# Patient Record
Sex: FEMALE | Race: WHITE | NOT HISPANIC OR LATINO | Employment: OTHER | URBAN - METROPOLITAN AREA
[De-identification: names, ages, dates, MRNs, and addresses within clinical notes are randomized per-mention and may not be internally consistent; named-entity substitution may affect disease eponyms.]

---

## 2017-02-13 ENCOUNTER — OFFICE VISIT (OUTPATIENT)
Dept: OBSTETRICS AND GYNECOLOGY | Facility: CLINIC | Age: 67
End: 2017-02-13
Attending: OBSTETRICS & GYNECOLOGY
Payer: MEDICARE

## 2017-02-13 VITALS
WEIGHT: 157.94 LBS | SYSTOLIC BLOOD PRESSURE: 120 MMHG | HEIGHT: 62 IN | DIASTOLIC BLOOD PRESSURE: 78 MMHG | BODY MASS INDEX: 29.06 KG/M2

## 2017-02-13 DIAGNOSIS — Z01.419 WELL FEMALE EXAM WITH ROUTINE GYNECOLOGICAL EXAM: ICD-10-CM

## 2017-02-13 DIAGNOSIS — R10.2 PELVIC PAIN IN FEMALE: ICD-10-CM

## 2017-02-13 DIAGNOSIS — Z11.51 SCREENING FOR HUMAN PAPILLOMAVIRUS: ICD-10-CM

## 2017-02-13 DIAGNOSIS — Z12.31 VISIT FOR SCREENING MAMMOGRAM: Primary | ICD-10-CM

## 2017-02-13 PROCEDURE — 88175 CYTOPATH C/V AUTO FLUID REDO: CPT

## 2017-02-13 PROCEDURE — 99999 PR PBB SHADOW E&M-EST. PATIENT-LVL III: CPT | Mod: PBBFAC,,, | Performed by: OBSTETRICS & GYNECOLOGY

## 2017-02-13 PROCEDURE — G0101 CA SCREEN;PELVIC/BREAST EXAM: HCPCS | Mod: S$GLB,,, | Performed by: OBSTETRICS & GYNECOLOGY

## 2017-02-13 PROCEDURE — 87624 HPV HI-RISK TYP POOLED RSLT: CPT

## 2017-02-13 RX ORDER — ERGOCALCIFEROL 1.25 MG/1
50000 CAPSULE ORAL
Refills: 0 | COMMUNITY
Start: 2017-01-16 | End: 2018-01-15

## 2017-02-13 RX ORDER — FLUTICASONE FUROATE AND VILANTEROL TRIFENATATE 100; 25 UG/1; UG/1
1 POWDER RESPIRATORY (INHALATION) DAILY
Refills: 0 | COMMUNITY
Start: 2017-01-06 | End: 2019-01-10 | Stop reason: SDUPTHER

## 2017-02-13 RX ORDER — ESTRADIOL 0.1 MG/G
1 CREAM VAGINAL DAILY
Qty: 42.5 G | Refills: 1 | Status: SHIPPED | OUTPATIENT
Start: 2017-02-13 | End: 2017-08-09 | Stop reason: SDUPTHER

## 2017-02-13 NOTE — PROGRESS NOTES
"CC: Well woman exam    Concha Hess is a 66 y.o. female  presents for a well woman exam.  She is not established.      HPI:  Patient presents with bladder pressure feeling that is intermittent.  Patient denies vaginal bleeding or dysuria.  Patient not currently on hormones but used then in the past.  Patient denies bloating.  Patient Had 2 vaginal deliveries with largest baby being 9#5.  Occasional stress incontinence but mild per patient.  Denies GI symptoms.  No constipation.    Past Medical History   Diagnosis Date    Acid reflux     Chronic lung disease     Depression     Gastric ulcer     Hyperlipidemia        Past Surgical History   Procedure Laterality Date     section      Breast surgery       AUGMENTATION    Appendectomy      Cosmetic surgery       FACE LIFT       OB History    Para Term  AB SAB TAB Ectopic Multiple Living   3 3        3      # Outcome Date GA Lbr Garfield/2nd Weight Sex Delivery Anes PTL Lv   3 Para    3.232 kg (7 lb 2 oz) F CS-LTranv   Y      Complications: Placenta previa   2 Para    3.289 kg (7 lb 4 oz) M Vag-Spont   Y   1 Para    4.224 kg (9 lb 5 oz) M Vag-Spont   Y          Family History   Problem Relation Age of Onset    Cancer Mother     Heart disease Father        Social History   Substance Use Topics    Smoking status: Former Smoker    Smokeless tobacco: Former User    Alcohol use Yes       Visit Vitals    /78    Ht 5' 2" (1.575 m)    Wt 71.6 kg (157 lb 15.4 oz)    BMI 28.89 kg/m2       ROS:  Review of Systems   Constitutional: Negative for fatigue.   Respiratory: Positive for cough. Negative for shortness of breath.    Cardiovascular: Negative for chest pain.   Gastrointestinal: Negative for abdominal pain, constipation, diarrhea and nausea.   Endocrine: Negative for heat intolerance.   Genitourinary: Positive for pelvic pain (describes pelvic pressure not pain that is intermittent). Negative for decreased urine " volume, dysuria, flank pain, urgency and vaginal discharge.   Musculoskeletal: Negative for back pain.   Skin: Negative for rash.   Neurological: Negative for headaches.   Hematological: Negative for adenopathy.   Psychiatric/Behavioral: Negative for dysphoric mood. The patient is not nervous/anxious.        Physical Exam:  Physical Exam:   Constitutional: She is oriented to person, place, and time. She appears well-developed and well-nourished.      Neck: No thyromegaly present.    Cardiovascular: Normal rate.     Pulmonary/Chest: Effort normal and breath sounds normal. Right breast exhibits no mass, no nipple discharge, no skin change, no tenderness and no bleeding. Left breast exhibits no mass, no nipple discharge, no skin change, no tenderness and no bleeding.        Abdominal: Soft. Normal appearance and bowel sounds are normal. She exhibits no distension and no mass. There is no tenderness. There is no rebound and no guarding.     Genitourinary: Vagina normal and uterus normal. Pelvic exam was performed with patient supine. There is no rash, tenderness, lesion or injury on the right labia. There is no rash, tenderness, lesion or injury on the left labia. Uterus is not enlarged, not fixed and not tender. Cervix is normal. Right adnexum displays no mass, no tenderness and no fullness. Left adnexum displays no mass, no tenderness and no fullness. No erythema, tenderness, rectocele, cystocele or unspecified prolapse of vaginal walls in the vagina. No signs of injury around the vagina. No vaginal discharge found. Cervix exhibits no motion tenderness, no discharge and no friability.   Genitourinary Comments: Vulvar atrophy noted on exam            Musculoskeletal: Normal range of motion and moves all extremeties.      Lymphadenopathy:     She has no axillary adenopathy.        Right: No supraclavicular adenopathy present.        Left: No supraclavicular adenopathy present.    Neurological: She is alert and oriented  to person, place, and time.    Skin: Skin is warm and dry.    Psychiatric: She has a normal mood and affect. Her behavior is normal. Judgment normal.       ASSESSMENT AND PLAN  Visit for screening mammogram  -     Mammo Digital Screening Bilat with Tomosynthesis CAD; Future; Expected date: 2/13/17    Pelvic pain in female  -     US Pelvis Comp with Transvag NON-OB (xpd; Future; Expected date: 2/13/17    Well female exam with routine gynecological exam  -     Liquid-based pap smear, screening  -     HPV DNA probe, amplified    Screening for human papillomavirus  -     Liquid-based pap smear, screening    Other orders  -     estradiol (ESTRACE) 0.01 % (0.1 mg/gram) vaginal cream; Place 1 g vaginally once daily.  Dispense: 42.5 g; Refill: 1        Patient was counseled today on A.C.S. Pap guidelines and recommendations for yearly pelvic exams, mammograms and monthly self breast exams; to see her PCP for other health maintenance.     No Follow-up on file.

## 2017-02-13 NOTE — MR AVS SNAPSHOT
[facility]  [address]  [address]  [facility] [address]  Phone: [phone]  Fax: [phone]                  Concha Hess   2017 9:30 AM   Office Visit    Description:  Female : 1950   Provider:  Alexandra Thompson MD   [facility]           Reason for Visit     new patient     Vulvar Itch     Urinary Pressure     Urinary Frequency           Diagnoses this Visit        Comments    Visit for screening mammogram    -  Primary     Pelvic pain in female         Well female exam with routine gynecological exam         Screening for human papillomavirus                To Do List           Future Appointments        Provider Department Dept Phone    3/3/2017 1:00 PM [facility], WOMEN'S ULTRASOUND [facility] Ultrasound [phone]    3/3/2017 1:30 PM Alexandra Thompson MD [facility] [phone]      Goals (5 Years of Data)     None      [facility] On Call     [facility] On Call Nurse Care Line -  Assistance  Registered nurses in the [facility] On Call Center provide clinical advisement, health education, appointment booking, and other advisory services.  Call for this free service at [phone].             Medications           Message regarding Medications     Verify the changes and/or additions to your medication regime listed below are the same as discussed with your clinician today.  If any of these changes or additions are incorrect, please notify your healthcare provider.             Verify that the below list of medications is an accurate representation of the medications you are currently taking.  If none reported, the list may be blank. If incorrect, please contact your healthcare provider. Carry this list with you in case of emergency.           Current Medications     aspirin (ECOTRIN) 81 MG EC tablet Take 81 mg by mouth once daily.    BREO ELLIPTA 100-25 mcg/dose diskus inhaler     clorazepate (TRANXENE) 7.5 MG Tab Take 7.5 mg by  "mouth 3 (three) times daily.    escitalopram (LEXAPRO) 10 MG tablet     multivit-mineral-iron-lutein Tab Take by mouth.    MULTIVITS-MIN/IRON/FA/LUTEIN (ULTIMATE WOMEN'S COMPLETE 50+ ORAL) Take by mouth.    VITAMIN D2 50,000 unit capsule Take 50,000 Units by mouth every 7 days.    zolpidem (AMBIEN) 10 mg Tab            Clinical Reference Information           Your Vitals Were     BP Height Weight BMI       120/78 5' 2" (1.575 m) 71.6 kg (157 lb 15.4 oz) 28.89 kg/m2       Blood Pressure          Most Recent Value    BP  120/78      Allergies as of 2/13/2017     Dilaudid [Hydromorphone]    Morphine      Immunizations Administered on Date of Encounter - 2/13/2017     None      Orders Placed During Today's Visit      Normal Orders This Visit    HPV DNA probe, amplified     Liquid-based pap smear, screening     Future Labs/Procedures Expected by Expires    Mammo Digital Screening Bilat with Tomosynthesis CAD  2/13/2017 4/15/2018    US Pelvis Comp with Transvag NON-OB (xpd  2/13/2017 2/13/2018      Language Assistance Services     ATTENTION: Language assistance services are available, free of charge. Please call [phone].      ATENCIÓN: Si habla adrian, tiene a perea disposición servicios gratuitos de asistencia lingüística. Llame al [phone].     [facility] Ý: N?u b?n nói Ti?ng Vi?t, có các d?ch v? h? tr? ngôn ng? mi?n phí dành cho b?n. G?i s? [phone].         [facility] complies with applicable Federal civil rights laws and does not discriminate on the basis of race, color, national origin, age, disability, or sex.        "

## 2017-02-13 NOTE — LETTER
February 13, 2017      Rafael Rudolph MD  [address]  Retired  [place] [address]           [facility]  [address]  [address] [place] [address]  Phone: [phone]  Fax: [phone]          Patient: Concha Hess   MR Number: [id number]   YOB: 1950   Date of Visit: 2/13/2017       Dear Dr. Rafael Rudolph:    Thank you for referring Concha Hess to me for evaluation. Attached you will find relevant portions of my assessment and plan of care.    If you have questions, please do not hesitate to call me. I look forward to following Concha Hess along with you.    Sincerely,    Alexandra Thompson MD    Enclosure  CC:  No Recipients    If you would like to receive this communication electronically, please contact [email or web address] or [phone] to request more information on [facility] Link access.    For providers and/or their staff who would like to refer a patient to [facility], please contact us through our one-stop-shop provider referral line, [facility], at [phone].    If you feel you have received this communication in error or would no longer like to receive these types of communications, please e-mail [email or web address]

## 2017-02-20 LAB — HUMAN PAPILLOMAVIRUS (HPV): NOT DETECTED

## 2017-03-03 ENCOUNTER — OFFICE VISIT (OUTPATIENT)
Dept: OBSTETRICS AND GYNECOLOGY | Facility: CLINIC | Age: 67
End: 2017-03-03
Attending: OBSTETRICS & GYNECOLOGY
Payer: MEDICARE

## 2017-03-03 VITALS — SYSTOLIC BLOOD PRESSURE: 120 MMHG | HEIGHT: 62 IN | DIASTOLIC BLOOD PRESSURE: 74 MMHG

## 2017-03-03 DIAGNOSIS — R93.89 ENDOMETRIAL THICKENING ON ULTRA SOUND: Primary | ICD-10-CM

## 2017-03-03 PROCEDURE — 1160F RVW MEDS BY RX/DR IN RCRD: CPT | Mod: S$GLB,,, | Performed by: OBSTETRICS & GYNECOLOGY

## 2017-03-03 PROCEDURE — 99213 OFFICE O/P EST LOW 20 MIN: CPT | Mod: S$GLB,,, | Performed by: OBSTETRICS & GYNECOLOGY

## 2017-03-03 PROCEDURE — 1157F ADVNC CARE PLAN IN RCRD: CPT | Mod: S$GLB,,, | Performed by: OBSTETRICS & GYNECOLOGY

## 2017-03-03 PROCEDURE — 1159F MED LIST DOCD IN RCRD: CPT | Mod: S$GLB,,, | Performed by: OBSTETRICS & GYNECOLOGY

## 2017-03-03 PROCEDURE — 99999 PR PBB SHADOW E&M-EST. PATIENT-LVL II: CPT | Mod: PBBFAC,,, | Performed by: OBSTETRICS & GYNECOLOGY

## 2017-03-03 NOTE — MR AVS SNAPSHOT
"    [facility]  [address]  [address]  [facility] [address]  Phone: [phone]  Fax: [phone]                  Concha Hess   3/3/2017 1:30 PM   Office Visit    Description:  Female : 1950   Provider:  Alexandra Thompson MD   Department:  [facility]                To Do List           Goals (5 Years of Data)     None      [facility] On Call     [facility] On Call Nurse Care Line -  Assistance  Registered nurses in the [facility] On Call Center provide clinical advisement, health education, appointment booking, and other advisory services.  Call for this free service at [phone].             Medications           Message regarding Medications     Verify the changes and/or additions to your medication regime listed below are the same as discussed with your clinician today.  If any of these changes or additions are incorrect, please notify your healthcare provider.             Verify that the below list of medications is an accurate representation of the medications you are currently taking.  If none reported, the list may be blank. If incorrect, please contact your healthcare provider. Carry this list with you in case of emergency.           Current Medications     aspirin (ECOTRIN) 81 MG EC tablet Take 81 mg by mouth once daily.    BREO ELLIPTA 100-25 mcg/dose diskus inhaler     clorazepate (TRANXENE) 7.5 MG Tab Take 7.5 mg by mouth 3 (three) times daily.    escitalopram (LEXAPRO) 10 MG tablet     estradiol (ESTRACE) 0.01 % (0.1 mg/gram) vaginal cream Place 1 g vaginally once daily.    multivit-mineral-iron-lutein Tab Take by mouth.    MULTIVITS-MIN/IRON/FA/LUTEIN (ULTIMATE WOMEN'S COMPLETE 50+ ORAL) Take by mouth.    VITAMIN D2 50,000 unit capsule Take 50,000 Units by mouth every 7 days.    zolpidem (AMBIEN) 10 mg Tab            Clinical Reference Information           Your Vitals Were     BP Height                120/74 5' 2" (1.575 m)          Blood Pressure     "      Most Recent Value    BP  120/74      Allergies as of 3/3/2017     Dilaudid [Hydromorphone]    Morphine      Immunizations Administered on Date of Encounter - 3/3/2017     None      Language Assistance Services     ATTENTION: Language assistance services are available, free of charge. Please call [phone].      ATENCIÓN: Si habla adrian, tiene a perea disposición servicios gratuitos de asistencia lingüística. Llame al [phone].     CHÚ Ý: N?u b?n nói Ti?ng Vi?t, có các d?ch v? h? tr? ngôn ng? mi?n phí dành cho b?n. G?i s? [phone].         [facility] complies with applicable Federal civil rights laws and does not discriminate on the basis of race, color, national origin, age, disability, or sex.

## 2017-03-06 ENCOUNTER — TELEPHONE (OUTPATIENT)
Dept: OBSTETRICS AND GYNECOLOGY | Facility: CLINIC | Age: 67
End: 2017-03-06

## 2017-03-06 DIAGNOSIS — R93.89 THICKENED ENDOMETRIUM: Primary | ICD-10-CM

## 2017-03-06 NOTE — TELEPHONE ENCOUNTER
Pt called in regards to a date about scheduling a surgical procedure. Pt wants to do it on 3/17. Please advise.

## 2017-03-08 NOTE — PROGRESS NOTES
Subjective:       Patient ID: Concha Hess is a 66 y.o. female.    Chief Complaint:  No chief complaint on file.      History of Present Illness  66 year old with no post menopausal bleeding but patient reports that vaginal pressure has been increasing.  Patient reports a discomfort on her bladder area.  Denies dysuria.      GYN & OB History  No LMP recorded. Patient is postmenopausal.   Date of Last Pap: 2017    OB History    Para Term  AB SAB TAB Ectopic Multiple Living   3 3        3      # Outcome Date GA Lbr Garfield/2nd Weight Sex Delivery Anes PTL Lv   3 Para    3.232 kg (7 lb 2 oz) F CS-LTranv   Y      Complications: Placenta previa   2 Para    3.289 kg (7 lb 4 oz) M Vag-Spont   Y   1 Para    4.224 kg (9 lb 5 oz) M Vag-Spont   Y          Past Medical History:   Diagnosis Date    Acid reflux     Chronic lung disease     Depression     Gastric ulcer     Hyperlipidemia        Past Surgical History:   Procedure Laterality Date    APPENDECTOMY      BREAST SURGERY      AUGMENTATION     SECTION      COSMETIC SURGERY      FACE LIFT       Review of Systems  Review of Systems   Constitutional: Negative for unexpected weight change.   Respiratory: Negative for shortness of breath.    Cardiovascular: Negative for palpitations.   Gastrointestinal: Positive for abdominal distention and abdominal pain (lower pelvic pain ). Negative for constipation, diarrhea and nausea.   Genitourinary: Positive for frequency and pelvic pain (pressure ). Negative for dysuria, genital sores, hematuria, menstrual problem, vaginal bleeding and vaginal discharge.        Objective:   Physical Exam:   Constitutional: She appears well-developed.        Pulmonary/Chest: Effort normal.        Abdominal: Soft. Bowel sounds are normal. She exhibits no distension. There is no tenderness.     Genitourinary: Uterus normal. Right adnexum displays mass. Left adnexum displays no mass. There is cystocele and  unspecified prolapse of vaginal walls in the vagina.              Lymphadenopathy:        Right: No inguinal adenopathy present.        Left: No inguinal adenopathy present.     Skin: Skin is warm.    Psychiatric: She has a normal mood and affect.        Assessment/ Plan:     Endometrial thickening on ultra sound    Other orders  -     Cancel: Tissue Specimen To Pathology, Obstetrics/Gynecology    EMB was unable to be performed due to cervical stenosis.  Will schedule patient for hysteroscopy joya elder and then patient needs follow up with uro-gyn.       No Follow-up on file.    Patient was counseled today on A.C.S. Pap guidelines and recommendations for yearly pelvic exams, mammograms and monthly self breast exams; to see her PCP for other health maintenance.

## 2017-03-14 ENCOUNTER — OFFICE VISIT (OUTPATIENT)
Dept: OBSTETRICS AND GYNECOLOGY | Facility: CLINIC | Age: 67
End: 2017-03-14
Payer: MEDICARE

## 2017-03-14 ENCOUNTER — ANESTHESIA EVENT (OUTPATIENT)
Dept: SURGERY | Facility: OTHER | Age: 67
End: 2017-03-14
Payer: MEDICARE

## 2017-03-14 ENCOUNTER — HOSPITAL ENCOUNTER (OUTPATIENT)
Dept: RADIOLOGY | Facility: OTHER | Age: 67
Discharge: HOME | End: 2017-03-14
Attending: OBSTETRICS & GYNECOLOGY
Payer: MEDICARE

## 2017-03-14 ENCOUNTER — HOSPITAL ENCOUNTER (OUTPATIENT)
Dept: PREADMISSION TESTING | Facility: OTHER | Age: 67
Discharge: HOME | End: 2017-03-14
Attending: OBSTETRICS & GYNECOLOGY
Payer: MEDICARE

## 2017-03-14 VITALS
HEIGHT: 62 IN | WEIGHT: 137 LBS | SYSTOLIC BLOOD PRESSURE: 122 MMHG | DIASTOLIC BLOOD PRESSURE: 74 MMHG | BODY MASS INDEX: 25.21 KG/M2

## 2017-03-14 VITALS
OXYGEN SATURATION: 95 % | HEART RATE: 70 BPM | TEMPERATURE: 98 F | DIASTOLIC BLOOD PRESSURE: 71 MMHG | BODY MASS INDEX: 25.21 KG/M2 | SYSTOLIC BLOOD PRESSURE: 125 MMHG | WEIGHT: 137 LBS | HEIGHT: 62 IN

## 2017-03-14 DIAGNOSIS — R93.89 THICKENED ENDOMETRIUM: ICD-10-CM

## 2017-03-14 DIAGNOSIS — R93.89 THICKENED ENDOMETRIUM: Primary | ICD-10-CM

## 2017-03-14 LAB
BASOPHILS # BLD AUTO: 0.06 K/UL
BASOPHILS NFR BLD: 0.8 %
DIFFERENTIAL METHOD: ABNORMAL
EOSINOPHIL # BLD AUTO: 0.3 K/UL
EOSINOPHIL NFR BLD: 3.4 %
ERYTHROCYTE [DISTWIDTH] IN BLOOD BY AUTOMATED COUNT: 14.3 %
HCT VFR BLD AUTO: 39.3 %
HGB BLD-MCNC: 12.9 G/DL
LYMPHOCYTES # BLD AUTO: 2.8 K/UL
LYMPHOCYTES NFR BLD: 35.4 %
MCH RBC QN AUTO: 29.7 PG
MCHC RBC AUTO-ENTMCNC: 32.8 %
MCV RBC AUTO: 91 FL
MONOCYTES # BLD AUTO: 0.5 K/UL
MONOCYTES NFR BLD: 6.4 %
NEUTROPHILS # BLD AUTO: 4.3 K/UL
NEUTROPHILS NFR BLD: 54 %
PLATELET # BLD AUTO: 383 K/UL
PMV BLD AUTO: 9.9 FL
RBC # BLD AUTO: 4.34 M/UL
WBC # BLD AUTO: 7.91 K/UL

## 2017-03-14 PROCEDURE — 99212 OFFICE O/P EST SF 10 MIN: CPT | Mod: S$GLB,,, | Performed by: OBSTETRICS & GYNECOLOGY

## 2017-03-14 PROCEDURE — 1160F RVW MEDS BY RX/DR IN RCRD: CPT | Mod: S$GLB,,, | Performed by: OBSTETRICS & GYNECOLOGY

## 2017-03-14 PROCEDURE — 71020 XR CHEST PA AND LATERAL PRE-OP: CPT | Mod: 26,,, | Performed by: RADIOLOGY

## 2017-03-14 PROCEDURE — 1159F MED LIST DOCD IN RCRD: CPT | Mod: S$GLB,,, | Performed by: OBSTETRICS & GYNECOLOGY

## 2017-03-14 PROCEDURE — 85025 COMPLETE CBC W/AUTO DIFF WBC: CPT

## 2017-03-14 PROCEDURE — 36415 COLL VENOUS BLD VENIPUNCTURE: CPT

## 2017-03-14 PROCEDURE — 1157F ADVNC CARE PLAN IN RCRD: CPT | Mod: S$GLB,,, | Performed by: OBSTETRICS & GYNECOLOGY

## 2017-03-14 PROCEDURE — 1126F AMNT PAIN NOTED NONE PRSNT: CPT | Mod: S$GLB,,, | Performed by: OBSTETRICS & GYNECOLOGY

## 2017-03-14 PROCEDURE — 71020 XR CHEST PA AND LATERAL PRE-OP: CPT | Mod: TC

## 2017-03-14 PROCEDURE — 99999 PR PBB SHADOW E&M-EST. PATIENT-LVL III: CPT | Mod: PBBFAC,,, | Performed by: OBSTETRICS & GYNECOLOGY

## 2017-03-14 RX ORDER — ONDANSETRON 4 MG/1
8 TABLET, ORALLY DISINTEGRATING ORAL EVERY 8 HOURS PRN
Status: CANCELLED | OUTPATIENT
Start: 2017-03-14

## 2017-03-14 RX ORDER — MIDAZOLAM HYDROCHLORIDE 5 MG/ML
4 INJECTION INTRAMUSCULAR; INTRAVENOUS
Status: DISCONTINUED | OUTPATIENT
Start: 2017-03-21 | End: 2017-03-15 | Stop reason: HOSPADM

## 2017-03-14 RX ORDER — CETIRIZINE HYDROCHLORIDE 10 MG/1
10 TABLET, CHEWABLE ORAL DAILY
COMMUNITY
End: 2021-09-10

## 2017-03-14 RX ORDER — SCOLOPAMINE TRANSDERMAL SYSTEM 1 MG/1
1 PATCH, EXTENDED RELEASE TRANSDERMAL ONCE
Status: CANCELLED | OUTPATIENT
Start: 2017-03-14 | End: 2017-03-14

## 2017-03-14 RX ORDER — DEXAMETHASONE SODIUM PHOSPHATE 4 MG/ML
8 INJECTION, SOLUTION INTRA-ARTICULAR; INTRALESIONAL; INTRAMUSCULAR; INTRAVENOUS; SOFT TISSUE ONCE
Status: CANCELLED | OUTPATIENT
Start: 2017-03-14 | End: 2017-03-14

## 2017-03-14 RX ORDER — SODIUM CHLORIDE, SODIUM LACTATE, POTASSIUM CHLORIDE, CALCIUM CHLORIDE 600; 310; 30; 20 MG/100ML; MG/100ML; MG/100ML; MG/100ML
INJECTION, SOLUTION INTRAVENOUS CONTINUOUS
Status: CANCELLED | OUTPATIENT
Start: 2017-03-14

## 2017-03-14 RX ORDER — ALBUTEROL SULFATE 2.5 MG/.5ML
2.5 SOLUTION RESPIRATORY (INHALATION) EVERY 4 HOURS PRN
Status: CANCELLED | OUTPATIENT
Start: 2017-03-14

## 2017-03-14 RX ORDER — ACETAMINOPHEN 10 MG/ML
1000 INJECTION, SOLUTION INTRAVENOUS
Status: CANCELLED | OUTPATIENT
Start: 2017-03-14 | End: 2017-03-14

## 2017-03-14 RX ORDER — FAMOTIDINE 20 MG/1
20 TABLET, FILM COATED ORAL
Status: CANCELLED | OUTPATIENT
Start: 2017-03-14 | End: 2017-03-14

## 2017-03-14 RX ORDER — HYDROGEN PEROXIDE 3 %
20 SOLUTION, NON-ORAL MISCELLANEOUS
COMMUNITY

## 2017-03-14 RX ORDER — PREGABALIN 75 MG/1
150 CAPSULE ORAL
Status: DISCONTINUED | OUTPATIENT
Start: 2017-03-21 | End: 2017-03-15 | Stop reason: HOSPADM

## 2017-03-14 NOTE — MR AVS SNAPSHOT
[facility]  [address]  [place] [address]  Phone: [phone]  Fax: [phone]                  Concha Hess   3/14/2017 10:45 AM   Office Visit    Description:  Female : 1950   Provider:  Alexandra Thompson MD   Department:  [facility]           Reason for Visit     Pre-op Exam           Diagnoses this Visit        Comments    Thickened endometrium    -  Primary            To Do List           Future Appointments        Provider Department Dept Phone    3/14/2017 2:00 PM PRE-ADMIT, [facility] [phone]    3/28/2017 1:00 PM Alexandra Thompson MD [facility] [phone]      Your Future Surgeries/Procedures     Mar 21, 2017   Surgery with Alexandra Thompson MD   [facility]    [address] [facility] [address] [phone]              Goals (5 Years of Data)     None      [facility] On [facility]     [facility] On [facility] Nurse Care Line -  Assistance  Registered nurses in the [facility] On Call Center provide clinical advisement, health education, appointment booking, and other advisory services.  Call for this free service at [phone].             Medications           Message regarding Medications     Verify the changes and/or additions to your medication regime listed below are the same as discussed with your clinician today.  If any of these changes or additions are incorrect, please notify your healthcare provider.             Verify that the below list of medications is an accurate representation of the medications you are currently taking.  If none reported, the list may be blank. If incorrect, please contact your healthcare provider. Carry this list with you in case of emergency.           Current Medications     aspirin (ECOTRIN) 81 MG EC tablet Take 81 mg by mouth once daily.    BREO ELLIPTA 100-25 mcg/dose diskus inhaler     clorazepate (TRANXENE)  "7.5 MG Tab Take 7.5 mg by mouth 3 (three) times daily.    escitalopram (LEXAPRO) 10 MG tablet     estradiol (ESTRACE) 0.01 % (0.1 mg/gram) vaginal cream Place 1 g vaginally once daily.    multivit-mineral-iron-lutein Tab Take by mouth.    MULTIVITS-MIN/IRON/FA/LUTEIN (ULTIMATE WOMEN'S COMPLETE 50+ ORAL) Take by mouth.    VITAMIN D2 50,000 unit capsule Take 50,000 Units by mouth every 7 days.    zolpidem (AMBIEN) 10 mg Tab            Clinical Reference Information           Your Vitals Were     BP Height Weight BMI       122/74 5' 2" (1.575 m) 62.1 kg (137 lb 0.3 oz) 25.06 kg/m2       Blood Pressure          Most Recent Value    BP  122/74      Allergies as of 3/14/2017     Dilaudid [Hydromorphone]    Morphine      Immunizations Administered on Date of Encounter - 3/14/2017     None      Language Assistance Services     ATTENTION: Language assistance services are available, free of charge. Please call [phone].      ATENCIÓN: Si habla español, tiene a perea disposición servicios gratuitos de asistencia lingüística. Llame al [phone].     [facility] Ý: N?u b?n nói Ti?ng Vi?t, có các d?ch v? h? tr? ngôn ng? mi?n phí dành cho b?n. G?i s? [phone].         [facility] complies with applicable Federal civil rights laws and does not discriminate on the basis of race, color, national origin, age, disability, or sex.        "

## 2017-03-14 NOTE — DISCHARGE INSTRUCTIONS
PRE-ADMIT TESTING -  [phone]    [address]  [facility]        OUTPATIENT SURGERY UNIT - [phone]    Your surgery has been scheduled at [facility]. We are pleased to have the opportunity to serve you. For Further Information please call [phone].    On the day of surgery please report to the Information Desk on the 1st floor.    CONTACT YOUR PHYSICIAN'S OFFICE THE DAY PRIOR TO YOUR SURGERY TO OBTAIN YOUR ARRIVAL TIME.     The evening before surgery do not eat anything after 9 p.m. ( this includes hard candy, chewing gum and mints).  You may have GATORADE, POWERADE AND WATER FROM 9 p.m. until leaving home to come to the hospital.   DO NOT DRINK ANY LIQUIDS ON THE WAY TO THE HOSPITAL.     SPECIAL MEDICATION INSTRUCTIONS: TAKE medications checked off by the Anesthesiologist on your Medication List.    Angiogram Patients: Take medications as instructed by your physician, including aspirin.     Surgery Patients:    If you take ASPIRIN - Your PHYSICIAN/SURGEON will need to inform you IF/OR when you need to stop taking aspirin prior to your surgery.     Do Not take any medications containing IBUPROFEN.  Do Not Wear any make-up or dark nail polish   (especially eye make-up) to surgery. If you come to surgery with makeup on you will be required to remove the makeup or nail polish.    Do not shave your surgical area at least 5 days prior to your surgery. The surgical prep will be performed at the hospital according to Infection Control regulations.    Leave all valuables at home.   Do Not wear any jewelry or watches, including any metal in body piercings.  Contact Lens must be removed before surgery. Either do not wear the contact lens or bring a case and solution for storage.  Please bring a container for eyeglasses or dentures as required.  Bring any paperwork your physician has provided, such as consent forms,  history and physicals, doctor's orders, etc.   Bring comfortable  clothes that are loose fitting to wear upon discharge. Take into consideration the type of surgery being performed.  Maintain your diet as advised per your physician the day prior to surgery.      Adequate rest the night before surgery is advised.   Park in the Parking lot behind the hospital or in the [facility] across the street from the parking lot. Parking is complimentary.  If you will be discharged the same day as your procedure, please arrange for a responsible adult to drive you home or to accompany you if traveling by taxi.   YOU WILL NOT BE PERMITTED TO DRIVE OR TO LEAVE THE HOSPITAL ALONE AFTER SURGERY.   It is strongly recommended that you arrange for someone to remain with you for the first 24 hrs following your surgery.       Thank you for your cooperation.  The Staff of [facility].        Bathing Instructions                                                                 Please shower the evening before and morning of your procedure with    ANTIBACTERIAL SOAP. ( DIAL, etc )  Concentrate on the surgical area   for at least 3 minutes and rinse completely. Dry off as usual.   Do not use any deodorant, powder, body lotions, perfume, after shave or    cologne.

## 2017-03-14 NOTE — IP AVS SNAPSHOT
[facility] Location ([facility])  [address] [place], [place] [id number]  Phone: [phone]           Patient Discharge Instructions    Our goal is to set you up for success. This packet includes information on your condition, medications, and your home care. It will help you to care for yourself so you don't get sicker.     Please ask your nurse if you have any questions.        There are many details to remember when preparing for your surgery. Here is what you will need to do, please ask your nurse if there are more specific instructions and if you have any questions:    1. 24 hours before procedure Do not smoke or drink alcoholic beverages 24 hours prior to your procedure    2. Eating before procedure Do not eat or drink anything 8 hours before your procedure - this includes gum, mints, and candy.     3. Day of procedure Please remove all jewelry for the procedure. If you wear contact lenses, dentures, hearing aids or glasses, bring a container to put them in during your surgery and give to a family member for safekeeping.  If your doctor has scheduled you for an overnight stay, bring a small overnight bag with any personal items that you need.    4. After procedure Make arrangements in advance for transportation home by a responsible adult. It is not safe to drive a vehicle during the 24 hours following surgery.     PLEASE NOTE: You may be contacted the day before your surgery to confirm your surgery date and arrival time. The Surgery schedule has many variables which may affect the time of your surgery case. Family members should be available if your surgery time changes.                [facility]  Unless otherwise directed by your provider, please contact [facility], our nurse care line that is available for 24/7 assistance.     [phone] (toll-free)    Registered nurses in the [facility] provide clinical advisement, health education, appointment booking, and other  advisory services.                    ** Verify the list of medication(s) below is accurate and up to date. Carry this with you in case of emergency. If your medications have changed, please notify your healthcare provider.             Medication List      TAKE these medications        Additional Info                      aspirin 81 MG EC tablet   Commonly known as:  ECOTRIN   Refills:  0   Dose:  81 mg    Instructions:  Take 81 mg by mouth once daily.     Begin Date    AM    Noon    PM    Bedtime       BREO ELLIPTA 100-25 mcg/dose diskus inhaler   Refills:  0   Generic drug:  fluticasone-vilanterol      Begin Date    AM    Noon    PM    Bedtime       cetirizine 10 mg chewable tablet   Refills:  0   Dose:  10 mg    Instructions:  Take 10 mg by mouth once daily.     Begin Date    AM    Noon    PM    Bedtime       clorazepate 7.5 MG Tab   Commonly known as:  TRANXENE   Refills:  0   Dose:  7.5 mg    Instructions:  Take 7.5 mg by mouth 3 (three) times daily.     Begin Date    AM    Noon    PM    Bedtime       dextromethorphan-guaifenesin  mg  mg per 12 hr tablet   Commonly known as:  MUCINEX DM   Refills:  0   Dose:  1 tablet    Instructions:  Take 1 tablet by mouth every 12 (twelve) hours.     Begin Date    AM    Noon    PM    Bedtime       escitalopram oxalate 10 MG tablet   Commonly known as:  LEXAPRO   Refills:  0      Begin Date    AM    Noon    PM    Bedtime       esomeprazole 20 MG capsule   Commonly known as:  NEXIUM   Refills:  0   Dose:  20 mg    Instructions:  Take 20 mg by mouth before breakfast.     Begin Date    AM    Noon    PM    Bedtime       estradiol 0.01 % (0.1 mg/gram) vaginal cream   Commonly known as:  ESTRACE   Quantity:  42.5 g   Refills:  1   Dose:  1 g    Instructions:  Place 1 g vaginally once daily.     Begin Date    AM    Noon    PM    Bedtime       multivit-mineral-iron-lutein Tab   Refills:  0    Instructions:  Take by mouth.     Begin Date    AM    Noon    PM    Bedtime        ULTIMATE WOMEN'S COMPLETE 50+ ORAL   Refills:  0    Instructions:  Take by mouth.     Begin Date    AM    Noon    PM    Bedtime       VITAMIN D2 50,000 unit Cap   Refills:  0   Dose:  58228 Units   Generic drug:  ergocalciferol    Instructions:  Take 50,000 Units by mouth every 7 days.     Begin Date    AM    Noon    PM    Bedtime                  Please bring to all follow up appointments:    1. A copy of your discharge instructions.  2. All medicines you are currently taking in their original bottles.  3. Identification and insurance card.    Please arrive 15 minutes ahead of scheduled appointment time.    Please call 24 hours in advance if you must reschedule your appointment and/or time.        Your Scheduled Appointments     Mar 14, 2017  2:00 PM CDT   Pre-Admit Testing Visit with PRE-ADMIT, [facility]    [address] [place] [id number] [phone]            Mar 28, 2017  1:00 PM CDT   Post OP with Alexandra Thompson MD   [facility] ([facility])    [address] [facility] [address] [phone]              Your Future Surgeries/Procedures     Mar 21, 2017   Surgery with Alexandra Thompson MD   [facility]    [address] [place] [address] [phone]                  Discharge Instructions       PRE-ADMIT TESTING -  [phone]    [address] [place] [address]        OUTPATIENT SURGERY UNIT - [phone]    Your surgery has been scheduled at [facility]. We are pleased to have the opportunity to serve you. For Further Information please call [phone].    On the day of surgery please report to the Information Desk on the 1st floor.    CONTACT YOUR PHYSICIAN'S OFFICE THE DAY PRIOR TO YOUR SURGERY TO OBTAIN YOUR ARRIVAL TIME.     The evening before surgery do not eat anything after 9 p.m. ( this includes hard  candy, chewing gum and mints).  You may have GATORADE, POWERADE AND WATER FROM 9 p.m. until leaving home to come to the hospital.   DO NOT DRINK ANY LIQUIDS ON THE WAY TO THE HOSPITAL.     SPECIAL MEDICATION INSTRUCTIONS: TAKE medications checked off by the Anesthesiologist on your Medication List.    Angiogram Patients: Take medications as instructed by your physician, including aspirin.     Surgery Patients:    If you take ASPIRIN - Your PHYSICIAN/SURGEON will need to inform you IF/OR when you need to stop taking aspirin prior to your surgery.     Do Not take any medications containing IBUPROFEN.  Do Not Wear any make-up or dark nail polish   (especially eye make-up) to surgery. If you come to surgery with makeup on you will be required to remove the makeup or nail polish.    Do not shave your surgical area at least 5 days prior to your surgery. The surgical prep will be performed at the hospital according to Infection Control regulations.    Leave all valuables at home.   Do Not wear any jewelry or watches, including any metal in body piercings.  Contact Lens must be removed before surgery. Either do not wear the contact lens or bring a case and solution for storage.  Please bring a container for eyeglasses or dentures as required.  Bring any paperwork your physician has provided, such as consent forms,  history and physicals, doctor's orders, etc.   Bring comfortable clothes that are loose fitting to wear upon discharge. Take into consideration the type of surgery being performed.  Maintain your diet as advised per your physician the day prior to surgery.      Adequate rest the night before surgery is advised.   Park in the Parking lot behind the hospital or in the [employer] [facility] across the street from the parking lot. Parking is complimentary.  If you will be discharged the same day as your procedure, please arrange for a responsible adult to drive you home or to accompany you if traveling by taxi.  "  YOU WILL NOT BE PERMITTED TO DRIVE OR TO LEAVE THE HOSPITAL ALONE AFTER SURGERY.   It is strongly recommended that you arrange for someone to remain with you for the first 24 hrs following your surgery.       Thank you for your cooperation.  The Staff of [facility].        Bathing Instructions                                                                 Please shower the evening before and morning of your procedure with    ANTIBACTERIAL SOAP. ( DIAL, etc )  Concentrate on the surgical area   for at least 3 minutes and rinse completely. Dry off as usual.   Do not use any deodorant, powder, body lotions, perfume, after shave or    cologne.                                                Admission Information     Date & Time Provider Department CSN    3/14/2017  2:00 PM Alexandra Thompson MD [facility] [id number]      Care Providers     Provider Role Specialty Primary office phone    Alexandra Thompson MD Attending Provider Obstetrics and Gynecology [phone]      Your Vitals Were     BP Pulse Temp Height Weight SpO2    125/71 70 98.3 °F (36.8 °C) 5' 2" (1.575 m) 62.1 kg (137 lb) 95%    BMI                25.06 kg/m2          Recent Lab Values     No lab values to display.      Allergies as of 3/14/2017        Reactions    Dilaudid [Hydromorphone] Nausea And Vomiting    Morphine Nausea And Vomiting      Advance Directives     An advance directive is a document which, in the event you are no longer able to make decisions for yourself, tells your healthcare team what kind of treatment you do or do not want to receive, or who you would like to make those decisions for you.  If you do not currently have an advance directive, [facility] encourages you to create one.  For more information call:  [phone]), [phone] ([phone]),  or log on to [email or web address].        Language Assistance Services     ATTENTION: Language assistance services are available, free " of charge. Please call [phone].      ATENCIÓN: Si habla español, tiene a perea disposición servicios gratuitos de asistencia lingüística. Llame al [phone].     CHÚ Ý: N?u b?n nói Ti?ng Vi?t, có các d?ch v? h? tr? ngôn ng? mi?n phí dành cho b?n. G?i s? [phone].         [facility] complies with applicable Federal civil rights laws and does not discriminate on the basis of race, color, national origin, age, disability, or sex.

## 2017-03-14 NOTE — PROGRESS NOTES
[facility]  History & Physical  Gynecology    SUBJECTIVE:     Chief Complaint/Reason for Admission: thickened lining on ultrasound    History of Present Illness:  Patient is a 66 y.o.  who presents with no pmb but has a thickened lining.  Patient with cervical stenosis and was not able to tolerate a uterine biopsy.  Patient also with uterine prolapse and stress incontinence and will see uro-gyn post operatively.       (Not in a hospital admission)    Review of patient's allergies indicates:   Allergen Reactions    Dilaudid [hydromorphone] Nausea And Vomiting    Morphine Nausea And Vomiting       Past Medical History:   Diagnosis Date    Acid reflux     Chronic lung disease     Depression     Gastric ulcer     Hyperlipidemia     Paralysis of diaphragm     left    PONV (postoperative nausea and vomiting)      Past Surgical History:   Procedure Laterality Date    APPENDECTOMY      BREAST SURGERY      AUGMENTATION     SECTION      COSMETIC SURGERY      FACE LIFT breast reduction     Family History   Problem Relation Age of Onset    Cancer Mother     Heart disease Father      Social History   Substance Use Topics    Smoking status: Former Smoker    Smokeless tobacco: Former User    Alcohol use Yes       Review of Systems:  Constitutional: no fever or chills  Respiratory: cough   Cardiovascular: no chest pain or palpitations  Genitourinary: no hematuria or dysuria     OBJECTIVE:     Vital Signs (Most Recent):  BP: 122/74 (17 1041)    Physical Exam:  General: well developed, well nourished  Lungs:  clear to auscultation bilaterally and normal respiratory effort  Cardiovascular: Heart: regular rate and rhythm, S1, S2 normal, no murmur, click, rub or gallop. Chest Wall: no tenderness. Extremities: no cyanosis or edema, or clubbing. Pulses: 2+ and symmetric.      Laboratory:  CBC: @LABRCNTIP(WBC,RBC,HGB,HCT,PLT,MCV,MCH,MCHC)@    Ultrasound:  Uterus- 8 weeks size  Ovaries-  Normal  ASSESSMENT/PLAN:     There are no hospital problems to display for this patient.      To OR for hysteroscopy d and c will order pre op chest x-ray for cough   Risks and benefits explained in detail to patient, including but not limited to damage to internal organs, including but not limited to bowel, bladder, uterus, tubes, ovaries, nerves, arteries, veins, possible need for blood transfusion. Patient is aware of all risks and desires surgery. All questions answered. Consents signed.

## 2017-03-14 NOTE — ANESTHESIA PREPROCEDURE EVALUATION
03/14/2017  Concha Hess is a 66 y.o., female.    [facility] Anesthesia Evaluation    I have reviewed the Patient Summary Reports.    I have reviewed the Nursing Notes.   I have reviewed the Medications.     Review of Systems  Anesthesia Hx:  Severe PONV last GA Denies Family Hx of Anesthesia complications.  Personal Hx of Anesthesia complications, Post-Operative Nausea/Vomiting, with every anesthetic, despite treatment   Social:  Former Smoker    Hematology/Oncology:  Hematology Normal   Oncology Normal     Cardiovascular:  Cardiovascular Normal     Pulmonary:   COPD, mild Paralyzed left diaphragmn of unknown origin   Renal/:  Renal/ Normal     Hepatic/GI:   GERD, well controlled    Musculoskeletal:  Musculoskeletal Normal    Neurological:  Neurology Normal    Endocrine:  Endocrine Normal        Physical Exam  General:  Well nourished    Airway/Jaw/Neck:  Airway Findings: Mouth Opening: Normal Tongue: Normal  General Airway Assessment: Adult  Mallampati: II         Dental:  Dental Findings: In tact, molar caps             Anesthesia Plan  Type of Anesthesia, risks & benefits discussed:  Anesthesia Type:  general  Patient's Preference:   Intra-op Monitoring Plan:   Intra-op Monitoring Plan Comments:   Post Op Pain Control Plan:   Post Op Pain Control Plan Comments:   Induction:    Beta Blocker:         Informed Consent: Patient understands risks and agrees with Anesthesia plan.  Questions answered. Anesthesia consent signed with patient.  ASA Score: 3     Day of Surgery Review of History & Physical:    H&P update referred to the surgeon.     Anesthesia Plan Notes: Chronic Paralyzed left diaphramgn ? etio  Current URI but no fever/cough  Consider propofol infusion (severe PONV)        Ready For Surgery From Anesthesia Perspective.

## 2017-03-17 ENCOUNTER — TELEPHONE (OUTPATIENT)
Dept: OBSTETRICS AND GYNECOLOGY | Facility: CLINIC | Age: 67
End: 2017-03-17

## 2017-03-21 ENCOUNTER — ANESTHESIA (OUTPATIENT)
Dept: SURGERY | Facility: OTHER | Age: 67
End: 2017-03-21
Payer: MEDICARE

## 2017-03-21 ENCOUNTER — SURGERY (OUTPATIENT)
Age: 67
End: 2017-03-21

## 2017-03-21 ENCOUNTER — HOSPITAL ENCOUNTER (OUTPATIENT)
Facility: OTHER | Age: 67
Discharge: HOME | End: 2017-03-21
Attending: OBSTETRICS & GYNECOLOGY | Admitting: OBSTETRICS & GYNECOLOGY
Payer: MEDICARE

## 2017-03-21 VITALS
SYSTOLIC BLOOD PRESSURE: 124 MMHG | RESPIRATION RATE: 16 BRPM | DIASTOLIC BLOOD PRESSURE: 62 MMHG | HEART RATE: 68 BPM | BODY MASS INDEX: 25.21 KG/M2 | OXYGEN SATURATION: 96 % | TEMPERATURE: 98 F | WEIGHT: 137 LBS | HEIGHT: 62 IN

## 2017-03-21 DIAGNOSIS — R93.89 THICKENED ENDOMETRIUM: ICD-10-CM

## 2017-03-21 PROCEDURE — C1782 MORCELLATOR: HCPCS | Performed by: OBSTETRICS & GYNECOLOGY

## 2017-03-21 PROCEDURE — 27201423 OPTIME MED/SURG SUP & DEVICES STERILE SUPPLY: Performed by: OBSTETRICS & GYNECOLOGY

## 2017-03-21 PROCEDURE — 63600175 PHARM REV CODE 636 W HCPCS: Performed by: NURSE ANESTHETIST, CERTIFIED REGISTERED

## 2017-03-21 PROCEDURE — 25000003 PHARM REV CODE 250: Performed by: ANESTHESIOLOGY

## 2017-03-21 PROCEDURE — 71000039 HC RECOVERY, EACH ADD'L HOUR: Performed by: OBSTETRICS & GYNECOLOGY

## 2017-03-21 PROCEDURE — 37000009 HC ANESTHESIA EA ADD 15 MINS: Performed by: OBSTETRICS & GYNECOLOGY

## 2017-03-21 PROCEDURE — 37000008 HC ANESTHESIA 1ST 15 MINUTES: Performed by: OBSTETRICS & GYNECOLOGY

## 2017-03-21 PROCEDURE — 25000242 PHARM REV CODE 250 ALT 637 W/ HCPCS: Performed by: ANESTHESIOLOGY

## 2017-03-21 PROCEDURE — 71000016 HC POSTOP RECOV ADDL HR: Performed by: OBSTETRICS & GYNECOLOGY

## 2017-03-21 PROCEDURE — 63600175 PHARM REV CODE 636 W HCPCS: Performed by: ANESTHESIOLOGY

## 2017-03-21 PROCEDURE — 25000003 PHARM REV CODE 250: Performed by: NURSE ANESTHETIST, CERTIFIED REGISTERED

## 2017-03-21 PROCEDURE — 36000706: Performed by: OBSTETRICS & GYNECOLOGY

## 2017-03-21 PROCEDURE — 88305 TISSUE EXAM BY PATHOLOGIST: CPT | Performed by: PATHOLOGY

## 2017-03-21 PROCEDURE — 71000033 HC RECOVERY, INTIAL HOUR: Performed by: OBSTETRICS & GYNECOLOGY

## 2017-03-21 PROCEDURE — 88305 TISSUE EXAM BY PATHOLOGIST: CPT | Mod: 26,,, | Performed by: PATHOLOGY

## 2017-03-21 PROCEDURE — 36000707: Performed by: OBSTETRICS & GYNECOLOGY

## 2017-03-21 PROCEDURE — 71000015 HC POSTOP RECOV 1ST HR: Performed by: OBSTETRICS & GYNECOLOGY

## 2017-03-21 PROCEDURE — 63600175 PHARM REV CODE 636 W HCPCS: Performed by: OBSTETRICS & GYNECOLOGY

## 2017-03-21 PROCEDURE — 94640 AIRWAY INHALATION TREATMENT: CPT

## 2017-03-21 RX ORDER — CEFAZOLIN SODIUM 2 G/50ML
2 SOLUTION INTRAVENOUS
Status: DISCONTINUED | OUTPATIENT
Start: 2017-03-21 | End: 2017-03-21 | Stop reason: HOSPADM

## 2017-03-21 RX ORDER — NAPROXEN SODIUM 275 MG/1
550 TABLET ORAL EVERY 12 HOURS PRN
Status: DISCONTINUED | OUTPATIENT
Start: 2017-03-21 | End: 2017-03-21 | Stop reason: HOSPADM

## 2017-03-21 RX ORDER — PHENYLEPHRINE HYDROCHLORIDE 10 MG/ML
INJECTION INTRAVENOUS
Status: DISCONTINUED | OUTPATIENT
Start: 2017-03-21 | End: 2017-03-21

## 2017-03-21 RX ORDER — ALBUTEROL SULFATE 0.83 MG/ML
2.5 SOLUTION RESPIRATORY (INHALATION) EVERY 4 HOURS PRN
Status: DISCONTINUED | OUTPATIENT
Start: 2017-03-21 | End: 2017-03-21 | Stop reason: HOSPADM

## 2017-03-21 RX ORDER — DIPHENHYDRAMINE HYDROCHLORIDE 50 MG/ML
INJECTION INTRAMUSCULAR; INTRAVENOUS
Status: DISCONTINUED | OUTPATIENT
Start: 2017-03-21 | End: 2017-03-21

## 2017-03-21 RX ORDER — LIDOCAINE HCL/PF 100 MG/5ML
SYRINGE (ML) INTRAVENOUS
Status: DISCONTINUED | OUTPATIENT
Start: 2017-03-21 | End: 2017-03-21

## 2017-03-21 RX ORDER — OXYCODONE HYDROCHLORIDE 5 MG/1
5 TABLET ORAL
Status: DISCONTINUED | OUTPATIENT
Start: 2017-03-21 | End: 2017-03-21 | Stop reason: HOSPADM

## 2017-03-21 RX ORDER — DIPHENHYDRAMINE HCL 25 MG
25 CAPSULE ORAL EVERY 4 HOURS PRN
Status: DISCONTINUED | OUTPATIENT
Start: 2017-03-21 | End: 2017-03-21 | Stop reason: HOSPADM

## 2017-03-21 RX ORDER — MIDAZOLAM HYDROCHLORIDE 1 MG/ML
INJECTION INTRAMUSCULAR; INTRAVENOUS
Status: DISCONTINUED | OUTPATIENT
Start: 2017-03-21 | End: 2017-03-21

## 2017-03-21 RX ORDER — ONDANSETRON 2 MG/ML
4 INJECTION INTRAMUSCULAR; INTRAVENOUS EVERY 4 HOURS PRN
Status: DISCONTINUED | OUTPATIENT
Start: 2017-03-21 | End: 2017-03-21 | Stop reason: HOSPADM

## 2017-03-21 RX ORDER — HYDROMORPHONE HYDROCHLORIDE 2 MG/ML
0.4 INJECTION, SOLUTION INTRAMUSCULAR; INTRAVENOUS; SUBCUTANEOUS EVERY 5 MIN PRN
Status: DISCONTINUED | OUTPATIENT
Start: 2017-03-21 | End: 2017-03-21 | Stop reason: HOSPADM

## 2017-03-21 RX ORDER — IBUPROFEN 600 MG/1
600 TABLET ORAL EVERY 6 HOURS PRN
Qty: 20 TABLET | Refills: 0 | Status: SHIPPED | OUTPATIENT
Start: 2017-03-21 | End: 2019-01-10

## 2017-03-21 RX ORDER — SODIUM CHLORIDE 0.9 % (FLUSH) 0.9 %
3 SYRINGE (ML) INJECTION
Status: DISCONTINUED | OUTPATIENT
Start: 2017-03-21 | End: 2017-03-21 | Stop reason: HOSPADM

## 2017-03-21 RX ORDER — SODIUM CHLORIDE 0.9 % (FLUSH) 0.9 %
3 SYRINGE (ML) INJECTION EVERY 8 HOURS
Status: DISCONTINUED | OUTPATIENT
Start: 2017-03-21 | End: 2017-03-21 | Stop reason: HOSPADM

## 2017-03-21 RX ORDER — ONDANSETRON 8 MG/1
8 TABLET, ORALLY DISINTEGRATING ORAL EVERY 8 HOURS PRN
Status: DISCONTINUED | OUTPATIENT
Start: 2017-03-21 | End: 2017-03-21 | Stop reason: HOSPADM

## 2017-03-21 RX ORDER — SODIUM CHLORIDE, SODIUM LACTATE, POTASSIUM CHLORIDE, CALCIUM CHLORIDE 600; 310; 30; 20 MG/100ML; MG/100ML; MG/100ML; MG/100ML
INJECTION, SOLUTION INTRAVENOUS CONTINUOUS
Status: DISCONTINUED | OUTPATIENT
Start: 2017-03-21 | End: 2017-03-21 | Stop reason: HOSPADM

## 2017-03-21 RX ORDER — KETOROLAC TROMETHAMINE 30 MG/ML
INJECTION, SOLUTION INTRAMUSCULAR; INTRAVENOUS
Status: DISCONTINUED | OUTPATIENT
Start: 2017-03-21 | End: 2017-03-21

## 2017-03-21 RX ORDER — SCOLOPAMINE TRANSDERMAL SYSTEM 1 MG/1
1 PATCH, EXTENDED RELEASE TRANSDERMAL ONCE
Status: COMPLETED | OUTPATIENT
Start: 2017-03-21 | End: 2017-03-21

## 2017-03-21 RX ORDER — DIPHENHYDRAMINE HYDROCHLORIDE 50 MG/ML
25 INJECTION INTRAMUSCULAR; INTRAVENOUS EVERY 4 HOURS PRN
Status: DISCONTINUED | OUTPATIENT
Start: 2017-03-21 | End: 2017-03-21 | Stop reason: HOSPADM

## 2017-03-21 RX ORDER — FAMOTIDINE 20 MG/1
20 TABLET, FILM COATED ORAL
Status: COMPLETED | OUTPATIENT
Start: 2017-03-21 | End: 2017-03-21

## 2017-03-21 RX ORDER — FENTANYL CITRATE 50 UG/ML
25 INJECTION, SOLUTION INTRAMUSCULAR; INTRAVENOUS EVERY 5 MIN PRN
Status: DISCONTINUED | OUTPATIENT
Start: 2017-03-21 | End: 2017-03-21 | Stop reason: HOSPADM

## 2017-03-21 RX ORDER — PROPOFOL 10 MG/ML
VIAL (ML) INTRAVENOUS CONTINUOUS PRN
Status: DISCONTINUED | OUTPATIENT
Start: 2017-03-21 | End: 2017-03-21

## 2017-03-21 RX ORDER — PROPOFOL 10 MG/ML
VIAL (ML) INTRAVENOUS
Status: DISCONTINUED | OUTPATIENT
Start: 2017-03-21 | End: 2017-03-21

## 2017-03-21 RX ORDER — DEXAMETHASONE SODIUM PHOSPHATE 4 MG/ML
8 INJECTION, SOLUTION INTRA-ARTICULAR; INTRALESIONAL; INTRAMUSCULAR; INTRAVENOUS; SOFT TISSUE ONCE
Status: COMPLETED | OUTPATIENT
Start: 2017-03-21 | End: 2017-03-21

## 2017-03-21 RX ORDER — MEPERIDINE HYDROCHLORIDE 50 MG/ML
12.5 INJECTION INTRAMUSCULAR; INTRAVENOUS; SUBCUTANEOUS ONCE AS NEEDED
Status: DISCONTINUED | OUTPATIENT
Start: 2017-03-21 | End: 2017-03-21 | Stop reason: HOSPADM

## 2017-03-21 RX ORDER — ONDANSETRON 2 MG/ML
INJECTION INTRAMUSCULAR; INTRAVENOUS
Status: DISCONTINUED | OUTPATIENT
Start: 2017-03-21 | End: 2017-03-21

## 2017-03-21 RX ORDER — ACETAMINOPHEN 10 MG/ML
1000 INJECTION, SOLUTION INTRAVENOUS
Status: COMPLETED | OUTPATIENT
Start: 2017-03-21 | End: 2017-03-21

## 2017-03-21 RX ADMIN — MIDAZOLAM HYDROCHLORIDE 2 MG: 1 INJECTION, SOLUTION INTRAMUSCULAR; INTRAVENOUS at 12:03

## 2017-03-21 RX ADMIN — ALBUTEROL SULFATE 2.5 MG: 2.5 SOLUTION RESPIRATORY (INHALATION) at 09:03

## 2017-03-21 RX ADMIN — DIPHENHYDRAMINE HYDROCHLORIDE 25 MG: 50 INJECTION, SOLUTION INTRAMUSCULAR; INTRAVENOUS at 12:03

## 2017-03-21 RX ADMIN — SODIUM CHLORIDE, SODIUM LACTATE, POTASSIUM CHLORIDE, AND CALCIUM CHLORIDE: 600; 310; 30; 20 INJECTION, SOLUTION INTRAVENOUS at 11:03

## 2017-03-21 RX ADMIN — PROPOFOL 20 MG: 10 INJECTION, EMULSION INTRAVENOUS at 12:03

## 2017-03-21 RX ADMIN — FAMOTIDINE 20 MG: 20 TABLET, FILM COATED ORAL at 10:03

## 2017-03-21 RX ADMIN — ACETAMINOPHEN 1000 MG: 10 INJECTION, SOLUTION INTRAVENOUS at 12:03

## 2017-03-21 RX ADMIN — LIDOCAINE HYDROCHLORIDE 75 MG: 20 INJECTION, SOLUTION INTRAVENOUS at 12:03

## 2017-03-21 RX ADMIN — ONDANSETRON 4 MG: 2 INJECTION INTRAMUSCULAR; INTRAVENOUS at 12:03

## 2017-03-21 RX ADMIN — PROPOFOL 150 MG: 10 INJECTION, EMULSION INTRAVENOUS at 12:03

## 2017-03-21 RX ADMIN — SCOPALAMINE 1.5 MG: 1 PATCH, EXTENDED RELEASE TRANSDERMAL at 10:03

## 2017-03-21 RX ADMIN — DEXAMETHASONE SODIUM PHOSPHATE 8 MG: 4 INJECTION, SOLUTION INTRAMUSCULAR; INTRAVENOUS at 12:03

## 2017-03-21 RX ADMIN — CARBOXYMETHYLCELLULOSE SODIUM 2 DROP: 2.5 SOLUTION/ DROPS OPHTHALMIC at 12:03

## 2017-03-21 RX ADMIN — FENTANYL CITRATE 100 MCG: 50 INJECTION, SOLUTION INTRAMUSCULAR; INTRAVENOUS at 12:03

## 2017-03-21 RX ADMIN — PHENYLEPHRINE HYDROCHLORIDE 100 MCG: 10 INJECTION INTRAVENOUS at 12:03

## 2017-03-21 RX ADMIN — PROPOFOL 150 MCG/KG/MIN: 10 INJECTION, EMULSION INTRAVENOUS at 12:03

## 2017-03-21 RX ADMIN — KETOROLAC TROMETHAMINE 30 MG: 30 INJECTION, SOLUTION INTRAMUSCULAR; INTRAVENOUS at 12:03

## 2017-03-21 NOTE — DISCHARGE SUMMARY
[facility]  Brief Operative Note     SUMMARY     Surgery Date: 3/21/2017     Surgeon(s) and Role:     * Alexandra Thompson MD - Primary    Assisting Surgeon: None    Pre-op Diagnosis:  Thickened endometrium [R93.8]    Post-op Diagnosis:  Post-Op Diagnosis Codes:     * Thickened endometrium [R93.8]    Procedure(s) (LRB):  [id number] (N/A)    Anesthesia: General    Description of the findings of the procedure: normal uterine cavity and cervical polyp    Findings/Key Components: as above    Estimated Blood Loss: * No values recorded between 3/21/2017 12:29 PM and 3/21/2017  1:06 PM *       5 cc  Specimens:   Specimen (12h ago through future)    Start     Ordered    03/21/17 1314  Specimen to Pathology - Surgery  Once     Comments:  Endometrial scrapings & polyp    03/21/17 1314          Discharge Note    SUMMARY     Admit Date: 3/21/2017    Discharge Date and Time:  03/21/2017 1:56 PM    Hospital Course (synopsis of major diagnoses, care, treatment, and services provided during the course of the hospital stay): normal hysteroscopy     Final Diagnosis: Post-Op Diagnosis Codes:     * Thickened endometrium [R93.8]    Disposition: Home or Self Care    Follow Up/Patient Instructions:     Medications:  Reconciled Home Medications:   Current Discharge Medication List      START taking these medications    Details   ibuprofen (ADVIL,MOTRIN) 600 MG tablet Take 1 tablet (600 mg total) by mouth every 6 (six) hours as needed for Pain.  Qty: 20 tablet, Refills: 0         CONTINUE these medications which have NOT CHANGED    Details   BREO ELLIPTA 100-25 mcg/dose diskus inhaler Refills: 0      cetirizine 10 mg chewable tablet Take 10 mg by mouth once daily.      clorazepate (TRANXENE) 7.5 MG Tab Take 7.5 mg by mouth 3 (three) times daily.      dextromethorphan-guaifenesin  mg (MUCINEX DM)  mg per 12 hr tablet Take 1 tablet by mouth every 12 (twelve) hours.      escitalopram (LEXAPRO)  10 MG tablet       esomeprazole (NEXIUM) 20 MG capsule Take 20 mg by mouth before breakfast.      estradiol (ESTRACE) 0.01 % (0.1 mg/gram) vaginal cream Place 1 g vaginally once daily.  Qty: 42.5 g, Refills: 1      VITAMIN D2 50,000 unit capsule Take 50,000 Units by mouth every 7 days.  Refills: 0      aspirin (ECOTRIN) 81 MG EC tablet Take 81 mg by mouth once daily.      multivit-mineral-iron-lutein Tab Take by mouth.      MULTIVITS-MIN/IRON/FA/LUTEIN (ULTIMATE WOMEN'S COMPLETE 50+ ORAL) Take by mouth.             Discharge Procedure Orders  Diet general     Call MD for:  temperature >100.4     Call MD for:  persistent nausea and vomiting     Call MD for:  severe uncontrolled pain     Call MD for:  difficulty breathing, headache or visual disturbances     Call MD for:  redness, tenderness, or signs of infection (pain, swelling, redness, odor or green/yellow discharge around incision site)       Follow-up Information     Follow up with Alexandra Thompson MD In 3 weeks.    Specialties:  Obstetrics and Gynecology, Gynecology, Obstetrics    Contact information:    [address]  [address] [place] [address] [phone]

## 2017-03-21 NOTE — H&P
[facility]  History & Physical  Gynecology    SUBJECTIVE:     Chief Complaint/Reason for Admission: thickened uterine lining    History of Present Illness:  Patient is a 66 y.o.  who presents with thickened uterine lining unable to tolerate uterine biopsy in office.  Fluid in uterine cavity noted.  No vaginal bleeding     PTA Medications   Medication Sig    aspirin (ECOTRIN) 81 MG EC tablet Take 81 mg by mouth once daily.    BREO ELLIPTA 100-25 mcg/dose diskus inhaler     cetirizine 10 mg chewable tablet Take 10 mg by mouth once daily.    clorazepate (TRANXENE) 7.5 MG Tab Take 7.5 mg by mouth 3 (three) times daily.    dextromethorphan-guaifenesin  mg (MUCINEX DM)  mg per 12 hr tablet Take 1 tablet by mouth every 12 (twelve) hours.    escitalopram (LEXAPRO) 10 MG tablet     esomeprazole (NEXIUM) 20 MG capsule Take 20 mg by mouth before breakfast.    estradiol (ESTRACE) 0.01 % (0.1 mg/gram) vaginal cream Place 1 g vaginally once daily.    multivit-mineral-iron-lutein Tab Take by mouth.    MULTIVITS-MIN/IRON/FA/LUTEIN (ULTIMATE WOMEN'S COMPLETE 50+ ORAL) Take by mouth.    VITAMIN D2 50,000 unit capsule Take 50,000 Units by mouth every 7 days.       Review of patient's allergies indicates:   Allergen Reactions    Dilaudid [hydromorphone] Nausea And Vomiting    Morphine Nausea And Vomiting       Past Medical History:   Diagnosis Date    Acid reflux     Chronic lung disease     Depression     Gastric ulcer     Hyperlipidemia     Paralysis of diaphragm     left    PONV (postoperative nausea and vomiting)      Past Surgical History:   Procedure Laterality Date    APPENDECTOMY      BREAST SURGERY      AUGMENTATION     SECTION      COSMETIC SURGERY      FACE LIFT breast reduction     Family History   Problem Relation Age of Onset    Cancer Mother     Heart disease Father      Social History   Substance Use Topics    Smoking status: Former Smoker     Smokeless tobacco: Former User    Alcohol use Yes       Review of Systems:  Constitutional: no fever or chills  Respiratory: no cough or shortness of breath  Cardiovascular: no chest pain or palpitations  Genitourinary: no hematuria or dysuria     OBJECTIVE:     Vital Signs (Most Recent):       Physical Exam:  General: well developed, well nourished  Lungs:  clear to auscultation bilaterally and normal respiratory effort  Cardiovascular: Heart: regular rate and rhythm, S1, S2 normal, no murmur, click, rub or gallop. Chest Wall: no tenderness. Extremities: no cyanosis or edema, or clubbing. Pulses: 2+ and symmetric.  Pelvic:   Normal exam     Laboratory:  CBC:   Recent Labs  Lab 03/14/17  1343   WBC 7.91   RBC 4.34   HGB 12.9   HCT 39.3   *   MCV 91   MCH 29.7   MCHC 32.8       Ultrasound:  Uterus- 8 weeks size  Ovaries- Normal  ASSESSMENT/PLAN:     Active Hospital Problems    Diagnosis  POA    Thickened endometrium [R93.8]  Yes      Resolved Hospital Problems    Diagnosis Date Resolved POA   No resolved problems to display.       To OR for hysteroscopy d and c unable to tolerate office biopsy   Risks and benefits explained in detail to patient, including but not limited to damage to internal organs, including but not limited to bowel, bladder, uterus, tubes, ovaries, nerves, arteries, veins, possible need for blood transfusion. Patient is aware of all risks and desires surgery. All questions answered. Consents signed.

## 2017-03-21 NOTE — TRANSFER OF CARE
"Anesthesia Transfer of Care Note    Patient: Concha Hess    Procedure(s) Performed: Procedure(s) (LRB):  [id number] (N/A)    Patient location: PACU    Anesthesia Type: general    Transport from OR: Transported from OR on 2-3 L/min O2 by NC with adequate spontaneous ventilation    Post pain: adequate analgesia    Post assessment: no apparent anesthetic complications    Post vital signs: stable    Level of consciousness: awake, alert and oriented    Nausea/Vomiting: no nausea/vomiting    Complications: none          Last vitals:   Visit Vitals    Pulse 80    Resp 18    Ht 5' 2" (1.575 m)    Wt 62.1 kg (137 lb)    SpO2 96%    Breastfeeding No    BMI 25.06 kg/m2     "

## 2017-03-21 NOTE — IP AVS SNAPSHOT
[facility] ([facility])  [address] [place] [id number]  Phone: [phone]           Patient Discharge Instructions     Our goal is to set you up for success. This packet includes information on your condition, medications, and your home care. It will help you to care for yourself so you don't get sicker and need to go back to the hospital.     Please ask your nurse if you have any questions.        There are many details to remember when preparing to leave the hospital. Here is what you will need to do:    1. Take your medicine. If you are prescribed medications, review your Medication List in the following pages. You may have new medications to  at the pharmacy and others that you'll need to stop taking. Review the instructions for how and when to take your medications. Talk with your doctor or nurses if you are unsure of what to do.     2. Go to your follow-up appointments. Specific follow-up information is listed in the following pages. Your may be contacted by a transition nurse or clinical provider about future appointments. Be sure we have all of the phone numbers to reach you, if needed. Please contact your provider's office if you are unable to make an appointment.     3. Watch for warning signs. Your doctor or nurse will give you detailed warning signs to watch for and when to call for assistance. These instructions may also include educational information about your condition. If you experience any of warning signs to your health, call your doctor.               ** Verify the list of medication(s) below is accurate and up to date. Carry this with you in case of emergency. If your medications have changed, please notify your healthcare provider.             Medication List      START taking these medications        Additional Info                      ibuprofen 600 MG tablet   Commonly known as:  ADVIL,MOTRIN   Quantity:  20 tablet   Refills:  0   Dose:  600 mg     Instructions:  Take 1 tablet (600 mg total) by mouth every 6 (six) hours as needed for Pain.     Begin Date    AM    Noon    PM    Bedtime         CONTINUE taking these medications        Additional Info                      aspirin 81 MG EC tablet   Commonly known as:  ECOTRIN   Refills:  0   Dose:  81 mg    Instructions:  Take 81 mg by mouth once daily.     Begin Date    AM    Noon    PM    Bedtime       BREO ELLIPTA 100-25 mcg/dose diskus inhaler   Refills:  0   Generic drug:  fluticasone-vilanterol      Begin Date    AM    Noon    PM    Bedtime       cetirizine 10 mg chewable tablet   Refills:  0   Dose:  10 mg    Instructions:  Take 10 mg by mouth once daily.     Begin Date    AM    Noon    PM    Bedtime       clorazepate 7.5 MG Tab   Commonly known as:  TRANXENE   Refills:  0   Dose:  7.5 mg    Instructions:  Take 7.5 mg by mouth 3 (three) times daily.     Begin Date    AM    Noon    PM    Bedtime       dextromethorphan-guaifenesin  mg  mg per 12 hr tablet   Commonly known as:  MUCINEX DM   Refills:  0   Dose:  1 tablet    Instructions:  Take 1 tablet by mouth every 12 (twelve) hours.     Begin Date    AM    Noon    PM    Bedtime       escitalopram oxalate 10 MG tablet   Commonly known as:  LEXAPRO   Refills:  0      Begin Date    AM    Noon    PM    Bedtime       esomeprazole 20 MG capsule   Commonly known as:  NEXIUM   Refills:  0   Dose:  20 mg    Instructions:  Take 20 mg by mouth before breakfast.     Begin Date    AM    Noon    PM    Bedtime       estradiol 0.01 % (0.1 mg/gram) vaginal cream   Commonly known as:  ESTRACE   Quantity:  42.5 g   Refills:  1   Dose:  1 g    Instructions:  Place 1 g vaginally once daily.     Begin Date    AM    Noon    PM    Bedtime       multivit-mineral-iron-lutein Tab   Refills:  0    Instructions:  Take by mouth.     Begin Date    AM    Noon    PM    Bedtime       ULTIMATE WOMEN'S COMPLETE 50+ ORAL   Refills:  0    Instructions:  Take by mouth.     Begin Date     AM    Noon    PM    Bedtime       VITAMIN D2 50,000 unit Cap   Refills:  0   Dose:  31971 Units   Generic drug:  ergocalciferol    Instructions:  Take 50,000 Units by mouth every 7 days.     Begin Date    AM    Noon    PM    Bedtime            Where to Get Your Medications      These medications were sent to [facility]. [address] [address]     Phone:  [phone]     ibuprofen 600 MG tablet                  Please bring to all follow up appointments:    1. A copy of your discharge instructions.  2. All medicines you are currently taking in their original bottles.  3. Identification and insurance card.    Please arrive 15 minutes ahead of scheduled appointment time.    Please call 24 hours in advance if you must reschedule your appointment and/or time.        Your Scheduled Appointments     Mar 28, 2017  1:00 PM CDT   Post OP with Alexandra Thompson MD   [facility]    [address]  [facility] [address] [phone]              Follow-up Information     Follow up with Alexandra Thompson MD In 3 weeks.    Specialties:  Obstetrics and Gynecology, Gynecology, Obstetrics    Contact information:    [address]  [address]  [facility] [address] [phone]          Discharge Instructions     Future Orders    Call MD for:  difficulty breathing, headache or visual disturbances     Call MD for:  persistent nausea and vomiting     Call MD for:  redness, tenderness, or signs of infection (pain, swelling, redness, odor or green/yellow discharge around incision site)     Call MD for:  severe uncontrolled pain     Call MD for:  temperature >100.4     Diet general     Questions:    Total calories:      Fat restriction, if any:      Protein restriction, if any:      Na restriction, if any:      Fluid restriction:      Additional restrictions:          Discharge Instructions       Home  Care Instruction D&C Hysteroscopy             ACTIVITY LEVEL:  If you received sedation and/or an anesthetic, you may feel sleepy for several hours. Rest until you feel more  awake. Gradually resume your normal activities.    DIET:  At home, continue with liquids. If there is no nausea, you may eat a soft diet and gradually resume a regular diet.    BATHING:  You may shower or tub bathe as desired in one day.    CARE:  You can expect watery or bloody vaginal discharge for several days. Do not use tampons, please only use pads. Sexual activity is restricted as advised by your doctor.    MEDICATIONS:  You will receive instructions for any pain and/or antibiotic prescriptions. Pain medication should be taken only if needed and as directed. Antibiotics, if ordered, should be taken as directed until the entire prescription is completed.    ADDITIONAL INFORMATION:  __________________________________________________________________________________________  WHEN TO CALL THE DOCTOR:   For any heavy vaginal bleeding   Fever over 101°F (38.4°C)   Any lower abdominal pain not relieved by the pain mediation  RETURN APPOINTMENT:  __________________________________________________________________________________________  FOR EMERGENCIES:  If any unusual problems or difficulties occur, contact  __________________ or the resident at [phone] (page ) or the Clinic office, [phone].  Anesthesia: After Your Surgery  Youve just had surgery. During surgery, you received medication called anesthesia to keep you comfortable and pain-free. After surgery, you may experience some pain or nausea. This is common. Here are some tips for feeling better and recovering after surgery.    Going home  Your doctor or nurse will show you how to take care of yourself when you go home. He or she will also answer your questions. Have an adult family member or friend drive you home. For the first 24 hours after your  surgery:  · Do not drive or use heavy equipment.  · Do not make important decisions or sign legal documents.  · Avoid alcohol.  · Have someone stay with you, if needed. He or she can watch for problems and help keep you safe.  Be sure to keep all follow-up appointments with your doctor. And rest after your procedure for as long as your doctor tells you to.    Coping with pain  If you have pain after surgery, pain medication will help you feel better. Take it as directed, before pain becomes severe. Also, ask your doctor or pharmacist about other ways to control pain, such as with heat, ice, and relaxation. And follow any other instructions your surgeon or nurse gives you.    Tips for taking pain medication  To get the best relief possible, remember these points:  · Pain medications can upset your stomach. Taking them with a little food may help.  · Most pain relievers taken by mouth need at least 20 to 30 minutes to take effect.  · Taking medication on a schedule can help you remember to take it. Try to time your medication so that you can take it before beginning an activity, such as dressing, walking, or sitting down for dinner.  · Constipation is a common side effect of pain medications. Contact your doctor before taking any medications like laxatives or stool softeners to help relieve constipation. Also ask about any dietary restrictions, because drinking lots of fluids and eating foods like fruits and vegetables that are high in fiber can also help. Remember, dont take laxatives unless your surgeon has prescribed them.  · Mixing alcohol and pain medication can cause dizziness and slow your breathing. It can even be fatal. Dont drink alcohol while taking pain medication.  · Pain medication can slow your reflexes. Dont drive or operate machinery while taking pain medication.  If your health care provider tells you to take acetaminophen to help relieve your pain, ask him or her how much you are supposed to take  each day. (Acetaminophen is the generic name for Tylenol and other brand-name pain relievers.) Acetaminophen or other pain relievers may interact with your prescription medicines or other over-the-counter (OTC) drugs. Some prescription medications contain acetaminophen along with other active ingredients. Using both prescription and OTC acetaminophen for pain can cause you to overdose. The FDA recommends that you read the labels on your OTC medications carefully. This will help you to clearly understand the list of active ingredients, dosing instructions, and any warnings. It may also help you avoid taking too much acetaminophen. If you have questions or don't understand the information, ask your pharmacist or health care provider to explain it to you before you take the OTC medication.    Managing nausea  Some people have an upset stomach after surgery. This is often due to anesthesia, pain, pain medications, or the stress of surgery. The following tips will help you manage nausea and get good nutrition as you recover. If you were on a special diet before surgery, ask your doctor if you should follow it during recovery. These tips may help:  · Dont push yourself to eat. Your body will tell you when to eat and how much.  · Start off with clear liquids and soup. They are easier to digest.  · Progress to semi-solid foods (mashed potatoes, applesauce, and gelatin) as you feel ready.  · Slowly move to solid foods. Dont eat fatty, rich, or spicy foods at first.  · Dont force yourself to have three large meals a day. Instead, eat smaller amounts more often.  · Take pain medications with a small amount of solid food, such as crackers or toast to avoid nausea.      Call your surgeon if  · You still have pain an hour after taking medication (it may not be strong enough).  · You feel too sleepy, dizzy, or groggy (medication may be too strong).  · You have side effects like nausea, vomiting, or skin changes (rash, itching,  "or hives).   © 0988-6231 [employer]. [address] [place] [address], [place] [address]. All rights reserved. This information is not intended as a substitute for professional medical care. Always follow your healthcare professional's instructions.                      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      Admission Information     Date & Time Provider Department CSN    3/21/2017  9:28 AM Alexandra Thompson MD [facility] [id number]      Care Providers     Provider Role Specialty Primary office phone    Alexandra Thompson MD Attending Provider Obstetrics and Gynecology [phone]    Alexandra Thompson MD Surgeon  Obstetrics and Gynecology [phone]      Your Vitals Were     BP Pulse Temp Resp Height Weight    113/64 66 97.6 °F (36.4 °C) (Oral) 16 5' 2" (1.575 m) 62.1 kg (137 lb)    SpO2 BMI             94% 25.06 kg/m2         Recent Lab Values     No lab values to display.      Pending Labs     Order Current Status    Specimen to Pathology - Surgery Collected (03/21/17 1314)      Allergies as of 3/21/2017        Reactions    Dilaudid [Hydromorphone] Nausea And Vomiting    Morphine Nausea And Vomiting      [facility]     [facility] Nurse Care Line - 24/7 Assistance  Unless otherwise directed by your provider, please contact [facility], our nurse care line that is available for 24/7 assistance.     Registered nurses in the [facility] provide clinical advisement, health education, appointment booking, and other advisory services.  Call for this free service at [phone].        Advance Directives     An advance directive is a document which, in the event you are no longer able to make decisions for yourself, tells your healthcare team what kind of treatment you do or do not want to receive, or who you would like to make those decisions for you.  If you do not currently have an advance directive, [facility] encourages you " to create one.  For more information call:  [phone]), [phone] ([phone]),  or log on to [email or web address].        Smoking Cessation     If you would like to quit smoking:   You may be eligible for free services if you are a [place] resident and started smoking cigarettes before September 1, 1988.  Call the Smoking Cessation [employer] (SCT) toll free at (548) 089-9678 or (146) 628-0023.   Call 8-073-QUIT-NOW if you do not meet the above criteria.            Language Assistance Services     ATTENTION: Language assistance services are available, free of charge. Please call [phone].      ATENCIÓN: Si noel adrian, tiene a perea disposición servicios gratuitos de asistencia lingüística. Llame al [phone].     CHÚ Ý: N?u b?n nói Ti?ng Vi?t, có các d?ch v? h? tr? ngôn ng? mi?n phí dành cho b?n. G?i s? [phone].         [facility] complies with applicable Federal civil rights laws and does not discriminate on the basis of race, color, national origin, age, disability, or sex.

## 2017-03-21 NOTE — OP NOTE
OPERATIVE REPORT    DATE: 3/21/2017    Pre-op Diagnosis: Thickened endometrium [R93.8]    Post-op Diagnosis: thickened endometrium and cervical polyp     Procedure(s):  [id number]     SURGEON: Dr. Alexandra Thompson    ANESTHESIA: General    COMPLICATIONS: None    EBL: 5 cc        URINE OUTPUT: 50  cc    FINDINGS: cervical polyp normal cavity     PROCEDURE: Patient was taking to the operating room where general anesthesia was administered and found to be adequate.  She was prepped and draped in the dorsal lithotomy position.  A weighted sterile speculum was placed in the vagina.  The anterior lip of the cervix was grasped with a single tooth tenaculum.  The uterus was sounded to approximately 8 cm.  The hysteroscope was advanced through the cervical os.  The endometrium was inspected and found to be normal.  Truclear was used for endometrial scrapping.  Resection time 3:31  Fluid deficit 200      All instruments were removed.  Silver nitrate was applied to the cervix where the single tooth tenaculum had been placed.  Excellent hemostasis was noted.    Sponge, lap, needle counts were correct x 2.

## 2017-03-21 NOTE — ANESTHESIA POSTPROCEDURE EVALUATION
"Anesthesia Post Evaluation    Patient: Concha Hess    Procedure(s) Performed: Procedure(s) (LRB):  [id number] (N/A)    Final Anesthesia Type: general  Patient location during evaluation: PACU  Patient participation: Yes- Able to Participate  Level of consciousness: oriented and awake  Post-procedure vital signs: reviewed and stable  Pain management: adequate  Airway patency: patent  PONV status at discharge: No PONV  Anesthetic complications: no      Cardiovascular status: hemodynamically stable  Respiratory status: unassisted, spontaneous ventilation and room air  Hydration status: euvolemic  Follow-up not needed.        Visit Vitals    /64    Pulse 66    Temp 36.4 °C (97.6 °F) (Oral)    Resp 16    Ht 5' 2" (1.575 m)    Wt 62.1 kg (137 lb)    SpO2 (!) 94%    Breastfeeding No    BMI 25.06 kg/m2       Pain/Hiral Score: Pain Assessment Performed: Yes (3/21/2017  2:00 PM)  Presence of Pain: complains of pain/discomfort (3/21/2017  2:00 PM)  Hiral Score: 9 (3/21/2017  2:00 PM)      "

## 2017-03-21 NOTE — DISCHARGE INSTRUCTIONS
Home Care Instruction D&C Hysteroscopy             ACTIVITY LEVEL:  If you received sedation and/or an anesthetic, you may feel sleepy for several hours. Rest until you feel more  awake. Gradually resume your normal activities.    DIET:  At home, continue with liquids. If there is no nausea, you may eat a soft diet and gradually resume a regular diet.    BATHING:  You may shower or tub bathe as desired in one day.    CARE:  You can expect watery or bloody vaginal discharge for several days. Do not use tampons, please only use pads. Sexual activity is restricted as advised by your doctor.    MEDICATIONS:  You will receive instructions for any pain and/or antibiotic prescriptions. Pain medication should be taken only if needed and as directed. Antibiotics, if ordered, should be taken as directed until the entire prescription is completed.    WHEN TO CALL THE DOCTOR:   For any heavy vaginal bleeding   Fever over 101°F (38.4°C)   Any lower abdominal pain not relieved by the pain mediation    FOR EMERGENCIES:  If any unusual problems or difficulties occur, contact Dr. Thompson.    Anesthesia: After Your Surgery  Youve just had surgery. During surgery, you received medication called anesthesia to keep you comfortable and pain-free. After surgery, you may experience some pain or nausea. This is common. Here are some tips for feeling better and recovering after surgery.    Going home  Your doctor or nurse will show you how to take care of yourself when you go home. He or she will also answer your questions. Have an adult family member or friend drive you home. For the first 24 hours after your surgery:    · Do not drive or use heavy equipment.  · Do not make important decisions or sign legal documents.  · Avoid alcohol.  · Have someone stay with you, if needed. He or she can watch for problems and help keep you safe.    Be sure to keep all follow-up appointments with your doctor. And rest after your procedure for as long  as your doctor tells you to.    Coping with pain  If you have pain after surgery, pain medication will help you feel better. Take it as directed, before pain becomes severe. Also, ask your doctor or pharmacist about other ways to control pain, such as with heat, ice, and relaxation. And follow any other instructions your surgeon or nurse gives you.    Tips for taking pain medication  To get the best relief possible, remember these points:    · Pain medications can upset your stomach. Taking them with a little food may help.  · Most pain relievers taken by mouth need at least 20 to 30 minutes to take effect.  · Taking medication on a schedule can help you remember to take it. Try to time your medication so that you can take it before beginning an activity, such as dressing, walking, or sitting down for dinner.  · Constipation is a common side effect of pain medications. Contact your doctor before taking any medications like laxatives or stool softeners to help relieve constipation. Also ask about any dietary restrictions, because drinking lots of fluids and eating foods like fruits and vegetables that are high in fiber can also help. Remember, dont take laxatives unless your surgeon has prescribed them.  · Mixing alcohol and pain medication can cause dizziness and slow your breathing. It can even be fatal. Dont drink alcohol while taking pain medication.  · Pain medication can slow your reflexes. Dont drive or operate machinery while taking pain medication.    If your health care provider tells you to take acetaminophen to help relieve your pain, ask him or her how much you are supposed to take each day. (Acetaminophen is the generic name for Tylenol and other brand-name pain relievers.) Acetaminophen or other pain relievers may interact with your prescription medicines or other over-the-counter (OTC) drugs. Some prescription medications contain acetaminophen along with other active ingredients. Using both  prescription and OTC acetaminophen for pain can cause you to overdose. The FDA recommends that you read the labels on your OTC medications carefully. This will help you to clearly understand the list of active ingredients, dosing instructions, and any warnings. It may also help you avoid taking too much acetaminophen. If you have questions or don't understand the information, ask your pharmacist or health care provider to explain it to you before you take the OTC medication.    Managing nausea  Some people have an upset stomach after surgery. This is often due to anesthesia, pain, pain medications, or the stress of surgery. The following tips will help you manage nausea and get good nutrition as you recover. If you were on a special diet before surgery, ask your doctor if you should follow it during recovery. These tips may help:    · Dont push yourself to eat. Your body will tell you when to eat and how much.  · Start off with clear liquids and soup. They are easier to digest.  · Progress to semi-solid foods (mashed potatoes, applesauce, and gelatin) as you feel ready.  · Slowly move to solid foods. Dont eat fatty, rich, or spicy foods at first.  · Dont force yourself to have three large meals a day. Instead, eat smaller amounts more often.  · Take pain medications with a small amount of solid food, such as crackers or toast to avoid nausea.      Call your surgeon if  · You still have pain an hour after taking medication (it may not be strong enough).  · You feel too sleepy, dizzy, or groggy (medication may be too strong).  · You have side effects like nausea, vomiting, or skin changes (rash, itching, or hives).     © 4280-8303 [employer]. [address] [place] [address], [place] [address]. All rights reserved. This information is not intended as a substitute for professional medical care. Always follow your healthcare professional's instructions.

## 2017-03-21 NOTE — PLAN OF CARE
Concha Hess has met all discharge criteria from Phase II. Vital Signs are stable, ambulating  without difficulty. Discharge instructions given, patient verbalized understanding. Discharged from facility via wheelchair in stable condition.

## 2017-03-21 NOTE — PLAN OF CARE
Patient prefers to have Deana Bagley (sister) present for discharge teaching. Please contact them @[phone].

## 2017-03-28 ENCOUNTER — OFFICE VISIT (OUTPATIENT)
Dept: OBSTETRICS AND GYNECOLOGY | Facility: CLINIC | Age: 67
End: 2017-03-28
Payer: MEDICARE

## 2017-03-28 VITALS
BODY MASS INDEX: 28.16 KG/M2 | SYSTOLIC BLOOD PRESSURE: 120 MMHG | WEIGHT: 153 LBS | HEIGHT: 62 IN | DIASTOLIC BLOOD PRESSURE: 84 MMHG

## 2017-03-28 DIAGNOSIS — Z98.890 POST-OPERATIVE STATE: Primary | ICD-10-CM

## 2017-03-28 PROCEDURE — 99024 POSTOP FOLLOW-UP VISIT: CPT | Mod: S$GLB,,, | Performed by: OBSTETRICS & GYNECOLOGY

## 2017-03-28 PROCEDURE — 99999 PR PBB SHADOW E&M-EST. PATIENT-LVL II: CPT | Mod: PBBFAC,,, | Performed by: OBSTETRICS & GYNECOLOGY

## 2017-03-28 NOTE — MR AVS SNAPSHOT
[facility]  [address]  [place] [address]  Phone: [phone]  Fax: [phone]                  Concha Hess   3/28/2017 1:00 PM   Office Visit    Description:  Female : 1950   Provider:  Alexandra Thompson MD   Department:  [facility]           Reason for Visit     Post-op Evaluation           Diagnoses this Visit        Comments    Post-operative state    -  Primary            To Do List           Future Appointments        Provider Department Dept Phone    2017 9:00 AM [facility] [phone]      Goals (5 Years of Data)     None      Follow-Up and Disposition     Return in about 6 months (around 2017).    Follow-up and Disposition History      [facility]     [facility] -  Assistance  Registered nurses in the [facility] provide clinical advisement, health education, appointment booking, and other advisory services.  Call for this free service at [phone].             Medications           Message regarding Medications     Verify the changes and/or additions to your medication regime listed below are the same as discussed with your clinician today.  If any of these changes or additions are incorrect, please notify your healthcare provider.             Verify that the below list of medications is an accurate representation of the medications you are currently taking.  If none reported, the list may be blank. If incorrect, please contact your healthcare provider. Carry this list with you in case of emergency.           Current Medications     aspirin (ECOTRIN) 81 MG EC tablet Take 81 mg by mouth once daily.    BREO ELLIPTA 100-25 mcg/dose diskus inhaler     cetirizine 10 mg chewable tablet Take 10 mg by mouth once daily.    clorazepate (TRANXENE) 7.5 MG Tab Take 7.5 mg by mouth 3 (three) times daily.    dextromethorphan-guaifenesin  mg (MUCINEX DM)   "mg per 12 hr tablet Take 1 tablet by mouth every 12 (twelve) hours.    escitalopram (LEXAPRO) 10 MG tablet     esomeprazole (NEXIUM) 20 MG capsule Take 20 mg by mouth before breakfast.    estradiol (ESTRACE) 0.01 % (0.1 mg/gram) vaginal cream Place 1 g vaginally once daily.    ibuprofen (ADVIL,MOTRIN) 600 MG tablet Take 1 tablet (600 mg total) by mouth every 6 (six) hours as needed for Pain.    multivit-mineral-iron-lutein Tab Take by mouth.    MULTIVITS-MIN/IRON/FA/LUTEIN (ULTIMATE WOMEN'S COMPLETE 50+ ORAL) Take by mouth.    VITAMIN D2 50,000 unit capsule Take 50,000 Units by mouth every 7 days.           Clinical Reference Information           Your Vitals Were     BP Height Weight BMI       120/84 5' 2" (1.575 m) 69.4 kg (153 lb) 27.98 kg/m2       Blood Pressure          Most Recent Value    BP  120/84      Allergies as of 3/28/2017     Dilaudid [Hydromorphone]    Morphine      Immunizations Administered on Date of Encounter - 3/28/2017     None      Language Assistance Services     ATTENTION: Language assistance services are available, free of charge. Please call [phone].      ATENCIÓN: Si noel adrian, tiene a perea disposición servicios gratuitos de asistencia lingüística. Llame al [phone].     OBINNA Ý: N?u b?n nói Ti?ng Vi?t, có các d?ch v? h? tr? ngôn ng? mi?n phí dành cho b?n. G?i s? [phone].         [facility] complies with applicable Federal civil rights laws and does not discriminate on the basis of race, color, national origin, age, disability, or sex.        "

## 2017-03-28 NOTE — PROGRESS NOTES
"Concha Hess is a 66 y.o. female patient.   1. Post-operative state    2.post operative exam from hysteroscopy with truclear- benign polyp and benign tissue  Patient doing well no complaints.  Pelvic pressure resolved.  Mild stress incontinence with coughing.  Desires no follow up for bladder symptoms at this time  Past Medical History:   Diagnosis Date    Acid reflux     Chronic lung disease     COPD (chronic obstructive pulmonary disease)     Depression     Gastric ulcer     Hyperlipidemia     Paralysis of diaphragm     left    PONV (postoperative nausea and vomiting)      No past surgical history pertinent negatives on file.  Scheduled Meds:  PRN Meds:    Review of patient's allergies indicates:   Allergen Reactions    Dilaudid [hydromorphone] Nausea And Vomiting    Morphine Nausea And Vomiting     There are no hospital problems to display for this patient.    Blood pressure 120/84, height 5' 2" (1.575 m), weight 69.4 kg (153 lb).    Subjective:   Diet: Adequate intake.    Activity level: Normal.    Pain control: Well controlled.      Objective   Assessment & Plan     Return in about 6 months (around 9/28/2017).  Pathology was discussed at appointment   Mammogram ordered.  Discussed if bladder symptoms worsen to call for urogyn appointment  Alexandra Thompson MD  3/28/2017  "

## 2017-06-26 ENCOUNTER — HOSPITAL ENCOUNTER (EMERGENCY)
Facility: OTHER | Age: 67
Discharge: HOME | End: 2017-06-26
Attending: EMERGENCY MEDICINE
Payer: MEDICARE

## 2017-06-26 VITALS
WEIGHT: 144 LBS | OXYGEN SATURATION: 96 % | HEIGHT: 62 IN | SYSTOLIC BLOOD PRESSURE: 110 MMHG | HEART RATE: 98 BPM | DIASTOLIC BLOOD PRESSURE: 60 MMHG | TEMPERATURE: 99 F | RESPIRATION RATE: 18 BRPM | BODY MASS INDEX: 26.5 KG/M2

## 2017-06-26 DIAGNOSIS — R10.12 ACUTE BILATERAL UPPER ABDOMINAL PAIN: Primary | ICD-10-CM

## 2017-06-26 DIAGNOSIS — R10.11 ACUTE BILATERAL UPPER ABDOMINAL PAIN: Primary | ICD-10-CM

## 2017-06-26 DIAGNOSIS — R10.13 ACUTE EPIGASTRIC PAIN: ICD-10-CM

## 2017-06-26 DIAGNOSIS — R11.2 NAUSEA AND VOMITING IN ADULT: ICD-10-CM

## 2017-06-26 LAB
ALBUMIN SERPL BCP-MCNC: 3.6 G/DL
ALP SERPL-CCNC: 100 U/L
ALT SERPL W/O P-5'-P-CCNC: 62 U/L
ANION GAP SERPL CALC-SCNC: 13 MMOL/L
AST SERPL-CCNC: 48 U/L
BASOPHILS # BLD AUTO: 0.02 K/UL
BASOPHILS NFR BLD: 0.1 %
BILIRUB SERPL-MCNC: 0.6 MG/DL
BUN SERPL-MCNC: 6 MG/DL
CALCIUM SERPL-MCNC: 9.2 MG/DL
CHLORIDE SERPL-SCNC: 101 MMOL/L
CO2 SERPL-SCNC: 22 MMOL/L
CREAT SERPL-MCNC: 0.8 MG/DL
DIFFERENTIAL METHOD: ABNORMAL
EOSINOPHIL # BLD AUTO: 0 K/UL
EOSINOPHIL NFR BLD: 0 %
ERYTHROCYTE [DISTWIDTH] IN BLOOD BY AUTOMATED COUNT: 14 %
EST. GFR  (AFRICAN AMERICAN): >60 ML/MIN/1.73 M^2
EST. GFR  (NON AFRICAN AMERICAN): >60 ML/MIN/1.73 M^2
GLUCOSE SERPL-MCNC: 148 MG/DL
HCT VFR BLD AUTO: 39.5 %
HGB BLD-MCNC: 13.4 G/DL
LIPASE SERPL-CCNC: 13 U/L
LYMPHOCYTES # BLD AUTO: 1.1 K/UL
LYMPHOCYTES NFR BLD: 4.2 %
MCH RBC QN AUTO: 29.9 PG
MCHC RBC AUTO-ENTMCNC: 33.9 %
MCV RBC AUTO: 88 FL
MONOCYTES # BLD AUTO: 3 K/UL
MONOCYTES NFR BLD: 12.1 %
NEUTROPHILS # BLD AUTO: 21 K/UL
NEUTROPHILS NFR BLD: 83.2 %
PLATELET # BLD AUTO: 311 K/UL
PMV BLD AUTO: 10.3 FL
POTASSIUM SERPL-SCNC: 3.1 MMOL/L
PROT SERPL-MCNC: 7.4 G/DL
RBC # BLD AUTO: 4.48 M/UL
SODIUM SERPL-SCNC: 136 MMOL/L
TROPONIN I SERPL DL<=0.01 NG/ML-MCNC: <0.006 NG/ML
WBC # BLD AUTO: 25.22 K/UL

## 2017-06-26 PROCEDURE — 96361 HYDRATE IV INFUSION ADD-ON: CPT

## 2017-06-26 PROCEDURE — 96375 TX/PRO/DX INJ NEW DRUG ADDON: CPT

## 2017-06-26 PROCEDURE — 93010 ELECTROCARDIOGRAM REPORT: CPT | Mod: ,,, | Performed by: INTERNAL MEDICINE

## 2017-06-26 PROCEDURE — 80053 COMPREHEN METABOLIC PANEL: CPT

## 2017-06-26 PROCEDURE — 84484 ASSAY OF TROPONIN QUANT: CPT

## 2017-06-26 PROCEDURE — 99284 EMERGENCY DEPT VISIT MOD MDM: CPT | Mod: 25

## 2017-06-26 PROCEDURE — 25000003 PHARM REV CODE 250: Performed by: EMERGENCY MEDICINE

## 2017-06-26 PROCEDURE — 93005 ELECTROCARDIOGRAM TRACING: CPT

## 2017-06-26 PROCEDURE — 96374 THER/PROPH/DIAG INJ IV PUSH: CPT

## 2017-06-26 PROCEDURE — 85025 COMPLETE CBC W/AUTO DIFF WBC: CPT

## 2017-06-26 PROCEDURE — 63600175 PHARM REV CODE 636 W HCPCS: Performed by: EMERGENCY MEDICINE

## 2017-06-26 PROCEDURE — 83690 ASSAY OF LIPASE: CPT

## 2017-06-26 RX ORDER — SODIUM CHLORIDE 9 MG/ML
1000 INJECTION, SOLUTION INTRAVENOUS
Status: COMPLETED | OUTPATIENT
Start: 2017-06-26 | End: 2017-06-26

## 2017-06-26 RX ORDER — FAMOTIDINE 10 MG/ML
20 INJECTION INTRAVENOUS
Status: COMPLETED | OUTPATIENT
Start: 2017-06-26 | End: 2017-06-26

## 2017-06-26 RX ORDER — ONDANSETRON 2 MG/ML
4 INJECTION INTRAMUSCULAR; INTRAVENOUS
Status: COMPLETED | OUTPATIENT
Start: 2017-06-26 | End: 2017-06-26

## 2017-06-26 RX ORDER — SUCRALFATE 1 G/1
1 TABLET ORAL 2 TIMES DAILY
Qty: 14 TABLET | Refills: 0 | Status: SHIPPED | OUTPATIENT
Start: 2017-06-26 | End: 2017-07-03

## 2017-06-26 RX ORDER — ONDANSETRON 8 MG/1
8 TABLET, ORALLY DISINTEGRATING ORAL EVERY 6 HOURS PRN
Qty: 15 TABLET | Refills: 0 | Status: SHIPPED | OUTPATIENT
Start: 2017-06-26 | End: 2018-01-11

## 2017-06-26 RX ORDER — POTASSIUM CHLORIDE 20 MEQ/1
40 TABLET, EXTENDED RELEASE ORAL
Status: COMPLETED | OUTPATIENT
Start: 2017-06-26 | End: 2017-06-26

## 2017-06-26 RX ADMIN — LIDOCAINE HYDROCHLORIDE: 20 SOLUTION ORAL; TOPICAL at 12:06

## 2017-06-26 RX ADMIN — ONDANSETRON 4 MG: 2 INJECTION INTRAMUSCULAR; INTRAVENOUS at 12:06

## 2017-06-26 RX ADMIN — FAMOTIDINE 20 MG: 10 INJECTION INTRAVENOUS at 12:06

## 2017-06-26 RX ADMIN — POTASSIUM CHLORIDE 40 MEQ: 1500 TABLET, EXTENDED RELEASE ORAL at 01:06

## 2017-06-26 RX ADMIN — SODIUM CHLORIDE 1000 ML: 0.9 INJECTION, SOLUTION INTRAVENOUS at 12:06

## 2017-06-26 NOTE — ED PROVIDER NOTES
"Encounter Date: 2017    SCRIBE #1 NOTE: I, Adowa Qureshi , am scribing for, and in the presence of, Dr. Baez.       History     Chief Complaint   Patient presents with    Abdominal Pain     upper abdominal pain with vomiting, and chills.      Time seen by provider: 12:36 AM    This is a 66 y.o. female, with history of GERD, who presents with complaint of abdominal pain. Symptoms began five hours ago. Upper abdominal pain is described as constant and severe. She notes pain is mainly located under the left breast and radiates to the RUQ. Pt reports nausea, vomiting, "soft" stools, and headache, but denies fever, chills, diaphoresis, blood in stool or vomitus, chest pain, leg swelling, SOB, myalgias, or generalized weakness. Pt is unable to tolerate liquids. Pain worsens with laying flat, and becomes better with sitting up. She applied an heating pad to the area with little relief. Pt has been taking care of grandchildren with similar symptoms, and denies previously experiencing symptoms.      The history is provided by the patient.     Review of patient's allergies indicates:   Allergen Reactions    Dilaudid [hydromorphone] Nausea And Vomiting    Morphine Nausea And Vomiting     Past Medical History:   Diagnosis Date    Acid reflux     Chronic lung disease     COPD (chronic obstructive pulmonary disease)     Depression     Gastric ulcer     Hyperlipidemia     Paralysis of diaphragm     left    PONV (postoperative nausea and vomiting)      Past Surgical History:   Procedure Laterality Date    APPENDECTOMY      BREAST SURGERY      AUGMENTATION     SECTION      COSMETIC SURGERY      FACE LIFT breast reduction     Family History   Problem Relation Age of Onset    Cancer Mother     Heart disease Father      Social History   Substance Use Topics    Smoking status: Former Smoker    Smokeless tobacco: Former User    Alcohol use Yes     Review of Systems   Constitutional: Negative for chills, " diaphoresis and fever.   HENT: Negative for sore throat.    Respiratory: Negative for shortness of breath.    Cardiovascular: Negative for chest pain and leg swelling.   Gastrointestinal: Positive for abdominal pain, nausea and vomiting. Negative for blood in stool.        Positive for soft stools. Negative for blood in vomitus.    Genitourinary: Negative for dysuria.   Musculoskeletal: Negative for back pain and myalgias.   Skin: Negative for rash.   Neurological: Positive for headaches. Negative for weakness.   Hematological: Does not bruise/bleed easily.       Physical Exam     Initial Vitals   BP Pulse Resp Temp SpO2   06/26/17 0021 06/26/17 0020 06/26/17 0020 06/26/17 0020 06/26/17 0020   (!) 130/58 (!) 125 18 98.3 °F (36.8 °C) 100 %      MAP       06/26/17 0021       82         Physical Exam    Nursing note and vitals reviewed.  Constitutional: She appears well-developed and well-nourished. She is not diaphoretic. No distress.   HENT:   Head: Normocephalic and atraumatic.   Right Ear: External ear normal.   Left Ear: External ear normal.   Eyes: EOM are normal. Pupils are equal, round, and reactive to light. Right eye exhibits no discharge. Left eye exhibits no discharge.   Neck: Normal range of motion.   Cardiovascular: Regular rhythm and normal heart sounds. Tachycardia present.  Exam reveals no gallop and no friction rub.    No murmur heard.  Pulmonary/Chest: Breath sounds normal. No respiratory distress. She has no wheezes. She has no rhonchi. She has no rales.   Lungs are clear to auscultation bilaterally.    Abdominal: Soft. There is no tenderness. There is no rebound and no guarding.   Musculoskeletal: Normal range of motion. She exhibits no edema or tenderness.   Neurological: She is alert and oriented to person, place, and time.   Skin: Skin is warm and dry. No rash and no abscess noted. No erythema. No pallor.   Psychiatric: She has a normal mood and affect. Her behavior is normal. Judgment and  thought content normal.         ED Course   Procedures  Labs Reviewed   CBC W/ AUTO DIFFERENTIAL - Abnormal; Notable for the following:        Result Value    WBC 25.22 (*)     Gran # 21.0 (*)     Mono # 3.0 (*)     Gran% 83.2 (*)     Lymph% 4.2 (*)     All other components within normal limits   COMPREHENSIVE METABOLIC PANEL - Abnormal; Notable for the following:     Potassium 3.1 (*)     CO2 22 (*)     Glucose 148 (*)     BUN, Bld 6 (*)     AST 48 (*)     ALT 62 (*)     All other components within normal limits   LIPASE   TROPONIN I     EKG Readings: (Independently Interpreted)   Sinus tachycardic rhythm at a rate of 113 bpm. Normal MN-QRS. PVCs. No ST-T wave changes.          Medical Decision Making:   Clinical Tests:   Lab Tests: Ordered and Reviewed  Medical Tests: Ordered and Reviewed    Additional MDM:   Comments: 66-year-old female presents complaining of bilateral upper abdominal pain with associated nausea and vomiting.  The patient did report having family members with symptoms consistent with acute gastroenteritis.  On exam she had no abdominal tenderness to palpation, was afebrile, hemodynamically stable and well-appearing.  Labs including CBC, CMP, and lipase were obtained and significant only for a leukocytosis with a left shift.  She received Pepcid and a GI cocktail as well as Zofran in the emergency department and upon reevaluation she reported her pain had resolved and she was able tolerate a by mouth challenge.  I did discuss with her lab results including this leukocytosis.  At this time I do feel that she is appropriate for discharge as her symptoms had resolved and she had a benign abdominal exam.  However, she was given strict indications for seeking reevaluation the emergency department.  She will be discharged home with a prescription for Carafate and Zofran..          Scribe Attestation:   Scribe #1: I performed the above scribed service and the documentation accurately describes the  services I performed. I attest to the accuracy of the note.    Attending Attestation:           Physician Attestation for Scribe:  Physician Attestation Statement for Scribe #1: I, Dr. Baez, reviewed documentation, as scribed by Adwoa Qureshi  in my presence, and it is both accurate and complete.                 ED Course     Clinical Impression:     1. Acute bilateral upper abdominal pain    2. Acute epigastric pain    3. Nausea and vomiting in adult                                 Lorena Baez MD  06/26/17 0245

## 2017-07-20 ENCOUNTER — TELEPHONE (OUTPATIENT)
Dept: UROGYNECOLOGY | Facility: CLINIC | Age: 67
End: 2017-07-20

## 2017-07-20 NOTE — TELEPHONE ENCOUNTER
----- Message from Gonzalez Cabrera MA sent at 7/20/2017 11:39 AM CDT -----  Contact: Self  Pt is calling in regards to scheduling a appt.  The pt states that she is having some bladder issue and was referred by Dr Thompson.  The pt can be reached at [phone].  Thanks FL

## 2017-08-09 ENCOUNTER — INITIAL CONSULT (OUTPATIENT)
Dept: UROGYNECOLOGY | Facility: CLINIC | Age: 67
End: 2017-08-09
Payer: MEDICARE

## 2017-08-09 ENCOUNTER — TELEPHONE (OUTPATIENT)
Dept: PRIMARY CARE CLINIC | Facility: CLINIC | Age: 67
End: 2017-08-09

## 2017-08-09 VITALS
HEIGHT: 62 IN | BODY MASS INDEX: 26.33 KG/M2 | WEIGHT: 143.06 LBS | SYSTOLIC BLOOD PRESSURE: 120 MMHG | DIASTOLIC BLOOD PRESSURE: 80 MMHG

## 2017-08-09 DIAGNOSIS — R39.15 URINARY URGENCY: Primary | ICD-10-CM

## 2017-08-09 DIAGNOSIS — N39.46 MIXED STRESS AND URGE URINARY INCONTINENCE: ICD-10-CM

## 2017-08-09 DIAGNOSIS — R35.1 NOCTURIA: ICD-10-CM

## 2017-08-09 DIAGNOSIS — N95.2 VAGINAL ATROPHY: ICD-10-CM

## 2017-08-09 DIAGNOSIS — N36.2 URETHRAL CARUNCLE: ICD-10-CM

## 2017-08-09 PROCEDURE — 87088 URINE BACTERIA CULTURE: CPT

## 2017-08-09 PROCEDURE — 1126F AMNT PAIN NOTED NONE PRSNT: CPT | Mod: S$GLB,,, | Performed by: OBSTETRICS & GYNECOLOGY

## 2017-08-09 PROCEDURE — 3008F BODY MASS INDEX DOCD: CPT | Mod: S$GLB,,, | Performed by: OBSTETRICS & GYNECOLOGY

## 2017-08-09 PROCEDURE — 51701 INSERT BLADDER CATHETER: CPT | Mod: S$GLB,,, | Performed by: OBSTETRICS & GYNECOLOGY

## 2017-08-09 PROCEDURE — 1159F MED LIST DOCD IN RCRD: CPT | Mod: S$GLB,,, | Performed by: OBSTETRICS & GYNECOLOGY

## 2017-08-09 PROCEDURE — 87186 SC STD MICRODIL/AGAR DIL: CPT

## 2017-08-09 PROCEDURE — 87077 CULTURE AEROBIC IDENTIFY: CPT

## 2017-08-09 PROCEDURE — 87086 URINE CULTURE/COLONY COUNT: CPT

## 2017-08-09 PROCEDURE — 99205 OFFICE O/P NEW HI 60 MIN: CPT | Mod: 25,S$GLB,, | Performed by: OBSTETRICS & GYNECOLOGY

## 2017-08-09 PROCEDURE — 99999 PR PBB SHADOW E&M-EST. PATIENT-LVL III: CPT | Mod: PBBFAC,,, | Performed by: OBSTETRICS & GYNECOLOGY

## 2017-08-09 RX ORDER — OXYBUTYNIN CHLORIDE 5 MG/1
5 TABLET ORAL 3 TIMES DAILY PRN
Qty: 90 TABLET | Refills: 11 | Status: SHIPPED | OUTPATIENT
Start: 2017-08-09 | End: 2018-01-11

## 2017-08-09 RX ORDER — CIPROFLOXACIN HYDROCHLORIDE 3 MG/ML
SOLUTION/ DROPS OPHTHALMIC
COMMUNITY
Start: 2017-08-07 | End: 2018-01-11

## 2017-08-09 RX ORDER — FLUTICASONE PROPIONATE 50 MCG
1 SPRAY, SUSPENSION (ML) NASAL DAILY
Refills: 0 | COMMUNITY
Start: 2017-07-16 | End: 2017-11-15 | Stop reason: SDUPTHER

## 2017-08-09 RX ORDER — DUREZOL 0.5 MG/ML
EMULSION OPHTHALMIC
COMMUNITY
Start: 2017-08-07 | End: 2018-01-11

## 2017-08-09 RX ORDER — KETOROLAC TROMETHAMINE 5 MG/ML
SOLUTION OPHTHALMIC
COMMUNITY
Start: 2017-08-07 | End: 2018-01-11

## 2017-08-09 RX ORDER — ESTRADIOL 0.1 MG/G
CREAM VAGINAL
Qty: 42.5 G | Refills: 3 | Status: SHIPPED | OUTPATIENT
Start: 2017-08-09 | End: 2019-01-10 | Stop reason: SDUPTHER

## 2017-08-09 NOTE — LETTER
August 9, 2017      Alexandra Thompson MD  [address]  [address] [address]           [facility]  [address] [place] [address]  Phone: [phone]          Patient: Concha Hess   MR Number: [id number]   YOB: 1950   Date of Visit: 8/9/2017       Dear Dr. Alexandra Thompson:    Thank you for referring Concha Hess to me for evaluation. Attached you will find relevant portions of my assessment and plan of care.    If you have questions, please do not hesitate to call me. I look forward to following Concha Hess along with you.    Sincerely,    Dandy Parra MD    Enclosure  CC:  No Recipients    If you would like to receive this communication electronically, please contact [email or web address] or [phone] to request more information on [employer] access.    For providers and/or their staff who would like to refer a patient to [facility], please contact us through our one-stop-shop provider referral line, [facility], at [phone].    If you feel you have received this communication in error or would no longer like to receive these types of communications, please e-mail [email or web address]

## 2017-08-09 NOTE — PROGRESS NOTES
[facility]  [address] [facility] [address]    [facility]  [id number]  1950  August 9, 2017    Consulting Physician: Alexandra Thompson MD   GYN: Alexandra Thompson MD  Primary M.D.: John Vinson MD    Chief Complaint   Patient presents with    PELVIC PRESSURE     pt states she has a lot of pelvic pressure     Vaginal Prolapse    Urinary Incontinence     pt states she leak urine when she  something    Constipation     pt states she has problems passing her bowels sometimes       HPI: 65 yo P3 F who presents with urinary issues x6 months. Describes suprapubic pressure with urination that starts daily around the afternoon time and continues through the night. Reports intermittent stream that requires her to sit on the toilet for awhile, occurs 4-5 times before she feels like she is done urinating. Uses crede maneuver.     1)  UI:  (+) DOLORES < (+) UUI  X 6months.  (+) pads:1/day, usually minimum wetness. No pads at night. Daytime frequency: Q 6 hours.  Nocturia: Yes: 2/night.   (--) dysuria,  (--) hematuria,  (--) frequent UTIs.  (+) complete bladder emptying but only after sitting and having several streams.     2)  POP:  absent. Symptoms:n/a.  (--) vaginal bleeding. (--) vaginal discharge. (--) sexually active.  (--) dyspareunia.  (--)  Vaginal dryness.  (--) vaginal estrogen use.     3)  BM:  (--) constipation/straining. Daily BM, usually pass easily, has to strain occasionally. Not on a regular bowel regimen. Takes stool softeners rarely.  (--) chronic diarrhea. (--) hematochezia.  (--) fecal incontinence.  (--) fecal smearing/urgency.  (+) complete evacuation.     Past Medical History  Past Medical History:   Diagnosis Date    Acid reflux     Chronic lung disease     COPD (chronic obstructive pulmonary disease)     Depression     Gastric ulcer     Hyperlipidemia     Paralysis of diaphragm     left    PONV (postoperative nausea and vomiting)         Past Surgical  History  Past Surgical History:   Procedure Laterality Date    APPENDECTOMY      BREAST SURGERY      AUGMENTATION     SECTION      COSMETIC SURGERY      FACE LIFT breast reduction    HYSTEROSCOPY  2017   appy Pfannenstiel    Hysterectomy: No    Past Ob History     x 2.  C/s x 1.    Largest infant weight: 9# 5 oz  possible FAVD? unsure. yes episiotomy.  Denies 3 or 4 degree lac.    Gynecologic History  LMP: No LMP recorded. Patient is postmenopausal.  Age of menarche: 13  Age of menopause: 49  Menstrual history: regular, normal flow  Pap test: 2017 -negative.  History of abnormal paps: No.  History of STIs:  No  Mammogram: Date of last: 2016.  Result: Normal per patient  Colonoscopy: Date of last: .  Result:  Negative per patient.  Repeat due:  .    DEXA:  Date of last: .  Result: osteopenia.      Family History  Family History   Problem Relation Age of Onset    Cancer Mother     Heart disease Father     Cervical cancer Neg Hx     Endometrial cancer Neg Hx     Vaginal cancer Neg Hx     Breast cancer Neg Hx     Ovarian cancer Neg Hx       Colon CA: sister -early stage   Breast CA: No  GYN CA: No   CA: No    Social History  History   Smoking Status    Former Smoker   Smokeless Tobacco    Former User     Cessation date: .   1 pack per week x ~35 years.     History   Alcohol Use    Yes   .    History   Drug Use No   .  The patient is .  Resides in [place] [address].  Employment status: retired, homemaker.      Allergies  Review of patient's allergies indicates:   Allergen Reactions    Dilaudid [hydromorphone] Nausea And Vomiting    Morphine Nausea And Vomiting       Medications  Current Outpatient Prescriptions on File Prior to Visit   Medication Sig Dispense Refill    aspirin (ECOTRIN) 81 MG EC tablet Take 81 mg by mouth once daily.      BREO ELLIPTA 100-25 mcg/dose diskus inhaler   0    cetirizine 10 mg chewable tablet Take 10 mg by mouth once daily.    "   clorazepate (TRANXENE) 7.5 MG Tab Take 7.5 mg by mouth 3 (three) times daily.      dextromethorphan-guaifenesin  mg (MUCINEX DM)  mg per 12 hr tablet Take 1 tablet by mouth every 12 (twelve) hours.      escitalopram (LEXAPRO) 10 MG tablet       esomeprazole (NEXIUM) 20 MG capsule Take 20 mg by mouth before breakfast.      ibuprofen (ADVIL,MOTRIN) 600 MG tablet Take 1 tablet (600 mg total) by mouth every 6 (six) hours as needed for Pain. 20 tablet 0    multivit-mineral-iron-lutein Tab Take by mouth.      MULTIVITS-MIN/IRON/FA/LUTEIN (ULTIMATE WOMEN'S COMPLETE 50+ ORAL) Take by mouth.      ondansetron (ZOFRAN-ODT) 8 MG TbDL Take 1 tablet (8 mg total) by mouth every 6 (six) hours as needed (nausea). 15 tablet 0    VITAMIN D2 50,000 unit capsule Take 50,000 Units by mouth every 7 days.  0    [DISCONTINUED] estradiol (ESTRACE) 0.01 % (0.1 mg/gram) vaginal cream Place 1 g vaginally once daily. 42.5 g 1     No current facility-administered medications on file prior to visit.        Review of Systems A 14 point ROS was reviewed with pertinent positives as noted above in the history of present illness.      Constitutional: negative  Eyes: negative  Endocrine: negative  Gastrointestinal: negative  Cardiovascular: negative  Respiratory: negative  Allergic/Immunologic: negative  Integumentary: negative  Psychiatric: negative  Musculoskeletal: negative   Ear/Nose/Throat: negative  Neurologic: negative  Genitourinary: SEE HPI  Hematologic/Lymphatic: negative   Breast: negative    Urogynecologic Exam  /80 (BP Location: Right arm, Patient Position: Sitting)   Ht 5' 2" (1.575 m)   Wt 64.9 kg (143 lb 1.3 oz)   BMI 26.17 kg/m²     GENERAL APPEARANCE:  The patient is well-developed, well-nourished.  Neck:  Supple with no thyromegaly, no carotid bruits.  Heart:  Regular rate and rhythm, no murmurs, rubs or gallops.  Lungs:  Clear.  No CVA tenderness.  Abdomen:  Soft, nontender, nondistended, no " hepatosplenomegaly.  Incisions:  vert midline/abd plasty well-healed    PELVIC:    External genitalia:  Normal Bartholins, Skenes and labia bilaterally.  Mild agglutination of labia minor to majora, concerning of lichen process.    Urethra:  + caruncle, diverticulum or masses.  (+) hypermobility.    Vagina:  Atrophy (+) , no bladder masses or tender, no discharge.    Cervix:  normal appearance  Uterus: normal size, contour, position, consistency, mobility, non-tender  Adnexa: Not palpable.    POP-Q:    Deferred.  No obvious POP present with valsalva.     NEUROLOGIC:  Cranial nerves 2 through 12 intact.  Strength 5/5.  DTRs 2+ lower extremities.  S2 through 4 normal.  Sacral reflexes intact.    EXT: BRUCE, 2+ pulses bilaterally, no C/C/E    COUGH STRESS TEST:  negative  KEGEL: 1 /5    RECTAL:    External:  Normal, (--) hemorrhoids, (--) dovetailing.   Internal:   deferred    PVR: 10 mL    Impression    1. Urinary urgency    2. Mixed stress and urge urinary incontinence    3. Nocturia    4. Vaginal atrophy    5. Urethral caruncle        Initial Plan  The patient was counseled regarding these issues. The patient was given a summary sheet containing each of these issues with possible options for evaluation and management. When appropriate, we also reviewed computer-generated diagrams specific to their diagnoses..  All questions were addressed to the patient's satisfaction.    1)  Urinary urgency with pelvic pressure, mild stress urinary incontinence:  --good pelvic support on exam; suspect bladder contractions/OAB-type issues  --urine C&S  --Empty bladder every 3 hours.  Empty well: wait a minute, lean forward on toilet.    --Avoid dietary irritants (see sheet).  Keep diary x 3-5 days to determine your irritants.  --KEGELS: do 10 in AM and 10 in PM, holding each x 10 seconds.  When you feel urge to go, STOP, KEGEL, and when urge has passed, then go to bathroom.  Consider PT in future.    --URGE: start anticholinergic,  ditropan 5 mg TID PRN.  Takes 2-4 weeks to see if will have effect. Side effects discussed. For dry mouth: get sour, sugar free lozenge or gum.  Reviewed fiber to prevent constipation.  --treat vaginal atrophy  --STRESS:  Pessary vs. Sling.     2)  Nocturia (nighttime urination): stop fluids 2 hours before bed/no water by bed.  If have leg swelling:  Elevate feet above chest x 1 hour before bed to get excess fluid off.  Can also use support hose (knee highs).      3)  Vaginal atrophy (dryness), urethral caruncle:  Use 0.5 gram of estrogen cream in vagina nightly x 2 weeks, then twice a week thereafter.  Use small amount of cream with finger around vaginal opening/inner lips.  --this may help bladder urgency/frequency and keep lichen from occuring    4)  RTC 8 weeks    Approximately 60 min were spent in consult, 90 % in discussion.     Thank you for requesting consultation of your patient.  I look forward to participating in their care.    Dandy Parra  Female Pelvic Medicine and Reconstructive Surgery  [facility] [place], [place]

## 2017-08-09 NOTE — TELEPHONE ENCOUNTER
----- Message from Janeen Fair sent at 8/7/2017 12:28 PM CDT -----  Patient requesting first available before August 22, 2017, needs pre op for cataract surgery, contact patient at [phone].           Thank you.

## 2017-08-09 NOTE — PATIENT INSTRUCTIONS
1)  Urinary urgency with pelvic pressure, mild stress urinary incontinence:  --good pelvic support on exam; suspect bladder contractions/OAB-type issues  --urine C&S  --Empty bladder every 3 hours.  Empty well: wait a minute, lean forward on toilet.    --Avoid dietary irritants (see sheet).  Keep diary x 3-5 days to determine your irritants.  --KEGELS: do 10 in AM and 10 in PM, holding each x 10 seconds.  When you feel urge to go, STOP, KEGEL, and when urge has passed, then go to bathroom.  Consider PT in future.    --URGE: start anticholinergic, ditropan 5 mg TID PRN.  Takes 2-4 weeks to see if will have effect. Side effects discussed. For dry mouth: get sour, sugar free lozenge or gum.  Reviewed fiber to prevent constipation.  --treat vaginal atrophy  --STRESS:  Pessary vs. Sling.     2)  Nocturia (nighttime urination): stop fluids 2 hours before bed/no water by bed.  If have leg swelling:  Elevate feet above chest x 1 hour before bed to get excess fluid off.  Can also use support hose (knee highs).      3)  Vaginal atrophy (dryness):  Use 0.5 gram of estrogen cream in vagina nightly x 2 weeks, then twice a week thereafter.  Use small amount of cream with finger around vaginal opening/inner lips.  --this may help bladder urgency/frequency and keep lichen from occuring    4)  RTC 8 weeks

## 2017-08-10 ENCOUNTER — OFFICE VISIT (OUTPATIENT)
Dept: PRIMARY CARE CLINIC | Facility: CLINIC | Age: 67
End: 2017-08-10
Payer: MEDICARE

## 2017-08-10 VITALS
HEART RATE: 84 BPM | SYSTOLIC BLOOD PRESSURE: 104 MMHG | HEIGHT: 62 IN | WEIGHT: 143 LBS | TEMPERATURE: 98 F | OXYGEN SATURATION: 95 % | DIASTOLIC BLOOD PRESSURE: 75 MMHG | BODY MASS INDEX: 26.31 KG/M2 | RESPIRATION RATE: 18 BRPM

## 2017-08-10 DIAGNOSIS — H25.9 AGE-RELATED CATARACT OF RIGHT EYE, UNSPECIFIED AGE-RELATED CATARACT TYPE: ICD-10-CM

## 2017-08-10 DIAGNOSIS — Z01.818 PREOPERATIVE EXAMINATION: Primary | ICD-10-CM

## 2017-08-10 PROBLEM — K21.9 GASTROESOPHAGEAL REFLUX DISEASE WITHOUT ESOPHAGITIS: Status: ACTIVE | Noted: 2017-08-10

## 2017-08-10 PROBLEM — M50.30 DDD (DEGENERATIVE DISC DISEASE), CERVICAL: Status: ACTIVE | Noted: 2017-08-10

## 2017-08-10 PROBLEM — E55.9 VITAMIN D DEFICIENCY: Status: ACTIVE | Noted: 2017-08-10

## 2017-08-10 PROBLEM — G47.00 INSOMNIA: Status: ACTIVE | Noted: 2017-08-10

## 2017-08-10 PROBLEM — F51.01 PRIMARY INSOMNIA: Status: ACTIVE | Noted: 2017-08-10

## 2017-08-10 PROBLEM — J44.9 COPD (CHRONIC OBSTRUCTIVE PULMONARY DISEASE): Status: ACTIVE | Noted: 2017-08-10

## 2017-08-10 PROBLEM — F32.A DEPRESSION: Status: ACTIVE | Noted: 2017-08-10

## 2017-08-10 PROBLEM — F41.9 ANXIETY: Status: ACTIVE | Noted: 2017-08-10

## 2017-08-10 PROBLEM — E78.00 HYPERCHOLESTEROLEMIA: Status: ACTIVE | Noted: 2017-08-10

## 2017-08-10 PROCEDURE — 3008F BODY MASS INDEX DOCD: CPT | Mod: S$GLB,,, | Performed by: FAMILY MEDICINE

## 2017-08-10 PROCEDURE — 99213 OFFICE O/P EST LOW 20 MIN: CPT | Mod: S$GLB,,, | Performed by: FAMILY MEDICINE

## 2017-08-10 PROCEDURE — 1159F MED LIST DOCD IN RCRD: CPT | Mod: S$GLB,,, | Performed by: FAMILY MEDICINE

## 2017-08-10 NOTE — PROGRESS NOTES
Subjective:       Patient ID: Concha Hess is a 66 y.o. female.    Chief Complaint: Pre-op Exam (for cataract sx on right eye)    Scheduled for cataract surgery OD on 8/22, will have OS done ~3 weeks later. Will have Versed for procedure. No recent illness or injury. No prior surgical complications other than nausea from pain meds.      Review of Systems   Constitutional: Negative for chills and fever.   Respiratory: Negative for shortness of breath.    Cardiovascular: Negative for chest pain and palpitations.   Gastrointestinal: Negative for nausea and vomiting.   Hematological: Does not bruise/bleed easily.       Objective:      Physical Exam   Constitutional: She is oriented to person, place, and time. She appears well-developed and well-nourished.   HENT:   Head: Normocephalic and atraumatic.   Eyes: EOM are normal. Pupils are equal, round, and reactive to light.   Neck: Normal range of motion. Neck supple. No JVD present.   Cardiovascular: Normal rate, regular rhythm and normal heart sounds.    Pulmonary/Chest: Effort normal and breath sounds normal.   Abdominal: Soft. Bowel sounds are normal. There is no tenderness.   Neurological: She is alert and oriented to person, place, and time.   Skin: Skin is warm and dry.       Assessment:       1. Preoperative examination    2. Age-related cataract of right eye, unspecified age-related cataract type        Plan:       Preoperative examination  Comments:  EKG normal. Cleared for surgery under MAC  Orders:  -     EKG 12-lead    Age-related cataract of right eye, unspecified age-related cataract type

## 2017-08-12 LAB — BACTERIA UR CULT: NORMAL

## 2017-08-13 ENCOUNTER — TELEPHONE (OUTPATIENT)
Dept: UROGYNECOLOGY | Facility: CLINIC | Age: 67
End: 2017-08-13

## 2017-08-13 DIAGNOSIS — N30.00 ACUTE CYSTITIS WITHOUT HEMATURIA: Primary | ICD-10-CM

## 2017-08-13 RX ORDER — NITROFURANTOIN 25; 75 MG/1; MG/1
100 CAPSULE ORAL 2 TIMES DAILY
Qty: 10 CAPSULE | Refills: 0 | Status: SHIPPED | OUTPATIENT
Start: 2017-08-13 | End: 2017-08-18

## 2017-08-13 NOTE — TELEPHONE ENCOUNTER
Recent urine culture with E coli UTI, sensitive to macrobid. Rx sent to pharmacy. Attempted to call patient, no answer. Left voicemail to call Dr. Parra's office tomorrow (Monday) morning to discuss recent test results.    Leann Almeida MD  OBGYN - PGY 3

## 2017-08-14 ENCOUNTER — TELEPHONE (OUTPATIENT)
Dept: UROGYNECOLOGY | Facility: CLINIC | Age: 67
End: 2017-08-14

## 2017-08-14 NOTE — TELEPHONE ENCOUNTER
Please let patient know she has UTI.  Macrobid sent to her [facility] on [address] (resident MD, Elle, called it in this weekend for her).  Can you please let her know to /start.  Treating may help some of her urinary issues, but she should also follow instructions we discussed at her last visit.     Thanks!

## 2017-08-14 NOTE — TELEPHONE ENCOUNTER
Spoke to pt who states she just received a call from  regarding her positive urine test and her treatment plan. Pt voiced understanding and call was ended.

## 2017-08-30 RX ORDER — CLORAZEPATE DIPOTASSIUM 7.5 MG/1
TABLET ORAL
Qty: 60 TABLET | Refills: 3 | Status: SHIPPED | OUTPATIENT
Start: 2017-08-30 | End: 2018-03-19 | Stop reason: SDUPTHER

## 2017-10-10 RX ORDER — DICLOFENAC POTASSIUM 50 MG/1
TABLET, FILM COATED ORAL
Qty: 60 TABLET | Refills: 3 | Status: SHIPPED | OUTPATIENT
Start: 2017-10-10 | End: 2019-01-10

## 2017-10-18 ENCOUNTER — TELEPHONE (OUTPATIENT)
Dept: UROGYNECOLOGY | Facility: CLINIC | Age: 67
End: 2017-10-18

## 2017-10-18 NOTE — TELEPHONE ENCOUNTER
Left voice message for pt to give the office a call back at [phone]. Called to r/s appointment that was scheduled for today.

## 2017-11-15 RX ORDER — FLUTICASONE PROPIONATE 50 MCG
SPRAY, SUSPENSION (ML) NASAL
Qty: 16 G | Refills: 11 | Status: SHIPPED | OUTPATIENT
Start: 2017-11-15 | End: 2019-01-10

## 2017-11-23 ENCOUNTER — NURSE TRIAGE (OUTPATIENT)
Dept: ADMINISTRATIVE | Facility: CLINIC | Age: 67
End: 2017-11-23

## 2017-11-23 NOTE — TELEPHONE ENCOUNTER
"  Reason for Disposition   Caller requesting a NON-URGENT new prescription or refill and triager unable to refill per unit policy    Answer Assessment - Initial Assessment Questions  1. SYMPTOMS: "Do you have any symptoms?"      I think I have a UTI  2. SEVERITY: If symptoms are present, ask "Are they mild, moderate or severe?"      Moderate    Protocols used: ST MEDICATION QUESTION CALL-A-AH    "

## 2017-11-24 ENCOUNTER — TELEPHONE (OUTPATIENT)
Dept: UROGYNECOLOGY | Facility: CLINIC | Age: 67
End: 2017-11-24

## 2018-01-11 ENCOUNTER — CLINICAL SUPPORT (OUTPATIENT)
Dept: PRIMARY CARE CLINIC | Facility: CLINIC | Age: 68
End: 2018-01-11
Payer: MEDICARE

## 2018-01-11 ENCOUNTER — OFFICE VISIT (OUTPATIENT)
Dept: PRIMARY CARE CLINIC | Facility: CLINIC | Age: 68
End: 2018-01-11
Payer: MEDICARE

## 2018-01-11 VITALS
DIASTOLIC BLOOD PRESSURE: 82 MMHG | HEART RATE: 74 BPM | TEMPERATURE: 98 F | OXYGEN SATURATION: 96 % | SYSTOLIC BLOOD PRESSURE: 126 MMHG | WEIGHT: 146 LBS | BODY MASS INDEX: 26.87 KG/M2 | RESPIRATION RATE: 18 BRPM | HEIGHT: 62 IN

## 2018-01-11 DIAGNOSIS — E78.00 HYPERCHOLESTEROLEMIA: ICD-10-CM

## 2018-01-11 DIAGNOSIS — J44.9 CHRONIC OBSTRUCTIVE PULMONARY DISEASE, UNSPECIFIED COPD TYPE: ICD-10-CM

## 2018-01-11 DIAGNOSIS — Z00.00 ANNUAL PHYSICAL EXAM: Primary | ICD-10-CM

## 2018-01-11 DIAGNOSIS — Z00.00 ANNUAL PHYSICAL EXAM: ICD-10-CM

## 2018-01-11 DIAGNOSIS — Z23 NEED FOR VACCINATION: ICD-10-CM

## 2018-01-11 DIAGNOSIS — E55.9 VITAMIN D DEFICIENCY: ICD-10-CM

## 2018-01-11 DIAGNOSIS — Z80.0 FAMILY HISTORY OF COLON CANCER: ICD-10-CM

## 2018-01-11 LAB
25(OH)D3+25(OH)D2 SERPL-MCNC: 14 NG/ML
ALBUMIN SERPL BCP-MCNC: 3.9 G/DL
ALP SERPL-CCNC: 83 U/L
ALT SERPL W/O P-5'-P-CCNC: 20 U/L
ANION GAP SERPL CALC-SCNC: 13 MMOL/L
AST SERPL-CCNC: 19 U/L
BASOPHILS # BLD AUTO: 0.06 K/UL
BASOPHILS NFR BLD: 0.8 %
BILIRUB SERPL-MCNC: 0.6 MG/DL
BUN SERPL-MCNC: 10 MG/DL
CALCIUM SERPL-MCNC: 9.7 MG/DL
CHLORIDE SERPL-SCNC: 105 MMOL/L
CHOLEST SERPL-MCNC: 275 MG/DL
CHOLEST/HDLC SERPL: 4.4 {RATIO}
CO2 SERPL-SCNC: 25 MMOL/L
CREAT SERPL-MCNC: 0.8 MG/DL
DIFFERENTIAL METHOD: ABNORMAL
EOSINOPHIL # BLD AUTO: 0.2 K/UL
EOSINOPHIL NFR BLD: 2.4 %
ERYTHROCYTE [DISTWIDTH] IN BLOOD BY AUTOMATED COUNT: 14.2 %
EST. GFR  (AFRICAN AMERICAN): >60 ML/MIN/1.73 M^2
EST. GFR  (NON AFRICAN AMERICAN): >60 ML/MIN/1.73 M^2
GLUCOSE SERPL-MCNC: 89 MG/DL
HCT VFR BLD AUTO: 40.5 %
HDLC SERPL-MCNC: 62 MG/DL
HDLC SERPL: 22.5 %
HGB BLD-MCNC: 13.1 G/DL
IMM GRANULOCYTES # BLD AUTO: 0.01 K/UL
IMM GRANULOCYTES NFR BLD AUTO: 0.1 %
LDLC SERPL CALC-MCNC: 195 MG/DL
LYMPHOCYTES # BLD AUTO: 2.4 K/UL
LYMPHOCYTES NFR BLD: 33.1 %
MCH RBC QN AUTO: 29.9 PG
MCHC RBC AUTO-ENTMCNC: 32.3 G/DL
MCV RBC AUTO: 93 FL
MONOCYTES # BLD AUTO: 0.6 K/UL
MONOCYTES NFR BLD: 8.4 %
NEUTROPHILS # BLD AUTO: 3.9 K/UL
NEUTROPHILS NFR BLD: 55.2 %
NONHDLC SERPL-MCNC: 213 MG/DL
NRBC BLD-RTO: 0 /100 WBC
PLATELET # BLD AUTO: 408 K/UL
PMV BLD AUTO: 10.5 FL
POTASSIUM SERPL-SCNC: 4.1 MMOL/L
PROT SERPL-MCNC: 7.3 G/DL
RBC # BLD AUTO: 4.38 M/UL
SODIUM SERPL-SCNC: 143 MMOL/L
TRIGL SERPL-MCNC: 90 MG/DL
TSH SERPL DL<=0.005 MIU/L-ACNC: 1.66 UIU/ML
WBC # BLD AUTO: 7.11 K/UL

## 2018-01-11 PROCEDURE — 80053 COMPREHEN METABOLIC PANEL: CPT

## 2018-01-11 PROCEDURE — 99999 PR PBB SHADOW E&M-EST. PATIENT-LVL II: CPT | Mod: PBBFAC,,,

## 2018-01-11 PROCEDURE — 85025 COMPLETE CBC W/AUTO DIFF WBC: CPT

## 2018-01-11 PROCEDURE — 99397 PER PM REEVAL EST PAT 65+ YR: CPT | Mod: S$GLB,,, | Performed by: FAMILY MEDICINE

## 2018-01-11 PROCEDURE — 82306 VITAMIN D 25 HYDROXY: CPT

## 2018-01-11 PROCEDURE — 80061 LIPID PANEL: CPT

## 2018-01-11 PROCEDURE — 99999 PR PBB SHADOW E&M-EST. PATIENT-LVL III: CPT | Mod: PBBFAC,,, | Performed by: FAMILY MEDICINE

## 2018-01-11 PROCEDURE — 84443 ASSAY THYROID STIM HORMONE: CPT

## 2018-01-11 NOTE — PROGRESS NOTES
"Subjective:       Patient ID: Concha Hess is a 67 y.o. female.    Chief Complaint: Labs Only (pt wants to get labs done and is fasting )    Had prolonged bout of bronchitis/COPD flare that started around Thanksgiving, lasted for ~6 weeks. Still having some hoarseness. Followed up with Dr. Loomis in early December, told that her PFT's from August improved and chest CT from August showed enlarged of previously noted pulmonary nodule (increased from 3 to 4mm).  Pt very upset that she had not been informed of this previously, so she is changing to a new pulmonologist, has initial consultation scheduled for later this month.       Review of Systems   Constitutional: Negative for diaphoresis, fever and unexpected weight change.   HENT: Negative for trouble swallowing.    Eyes: Positive for visual disturbance.   Respiratory: Positive for cough and shortness of breath (mild). Negative for wheezing.    Cardiovascular: Negative for chest pain, palpitations and leg swelling.   Gastrointestinal: Negative for blood in stool, nausea and vomiting.   Genitourinary: Negative for difficulty urinating.   Musculoskeletal: Negative for joint swelling.   Skin: Negative for rash.   Neurological: Negative for seizures.   Hematological: Does not bruise/bleed easily.   Psychiatric/Behavioral: Positive for sleep disturbance.       Objective:      Vitals:    01/11/18 1130   BP: 126/82   BP Location: Left arm   Patient Position: Sitting   BP Method: Medium (Automatic)   Pulse: 74   Resp: 18   Temp: 97.9 °F (36.6 °C)   TempSrc: Oral   SpO2: 96%   Weight: 66.2 kg (146 lb)   Height: 5' 2" (1.575 m)     Physical Exam   Constitutional: She is oriented to person, place, and time. She appears well-developed and well-nourished.   HENT:   Head: Normocephalic and atraumatic.   Mouth/Throat: Oropharynx is clear and moist and mucous membranes are normal.   Eyes: EOM are normal. Pupils are equal, round, and reactive to light.   Neck: No JVD present. " Carotid bruit is not present.   Cardiovascular: Normal rate, regular rhythm and normal heart sounds.    Pulses:       Radial pulses are 2+ on the right side, and 2+ on the left side.   Pulmonary/Chest: Effort normal and breath sounds normal.   Abdominal: Soft. Bowel sounds are normal. She exhibits no distension. There is no tenderness.   Musculoskeletal: She exhibits no edema.   Neurological: She is alert and oriented to person, place, and time.   Skin: Skin is warm and dry.   Psychiatric: She has a normal mood and affect. Her behavior is normal.   Nursing note and vitals reviewed.      Assessment:       1. Annual physical exam    2. Need for vaccination    3. Chronic obstructive pulmonary disease, unspecified COPD type    4. Hypercholesterolemia    5. Vitamin D deficiency    6. Family history of colon cancer        Plan:       Annual physical exam  Comments:  sees GYN annually for Pap, mammo and Dexa scan  Orders:  -     CBC auto differential; Future; Expected date: 01/11/2018  -     Comprehensive metabolic panel; Future; Expected date: 01/11/2018  -     Lipid panel; Future; Expected date: 01/11/2018  -     TSH; Future; Expected date: 01/11/2018  -     Vitamin D; Future; Expected date: 01/11/2018    Need for vaccination  Comments:  declined flu vaccine    Chronic obstructive pulmonary disease, unspecified COPD type  -     CBC auto differential; Future; Expected date: 01/11/2018    Hypercholesterolemia  -     Comprehensive metabolic panel; Future; Expected date: 01/11/2018  -     Lipid panel; Future; Expected date: 01/11/2018  -     TSH; Future; Expected date: 01/11/2018    Vitamin D deficiency  -     Vitamin D; Future; Expected date: 01/11/2018    Family history of colon cancer  Comments:  advised to f/u with GI, may need repeat colonoscopy      Medication List with Changes/Refills   Current Medications    ASPIRIN (ECOTRIN) 81 MG EC TABLET    Take 81 mg by mouth once daily.    BREO ELLIPTA 100-25 MCG/DOSE DISKUS  INHALER    1 puff once daily.     CETIRIZINE 10 MG CHEWABLE TABLET    Take 10 mg by mouth once daily.    CLORAZEPATE (TRANXENE) 7.5 MG TAB    take 1 tablet by mouth twice a day if needed    DICLOFENAC (CATAFLAM) 50 MG TABLET    take 1 tablet by mouth twice a day    ESCITALOPRAM (LEXAPRO) 10 MG TABLET    10 mg every evening.     ESOMEPRAZOLE (NEXIUM) 20 MG CAPSULE    Take 20 mg by mouth before breakfast.    ESTRADIOL (ESTRACE) 0.01 % (0.1 MG/GRAM) VAGINAL CREAM    Use 0.5 gram of estrogen cream in vagina nightly x 2 weeks, then twice a week thereafter    FLUTICASONE (FLONASE) 50 MCG/ACTUATION NASAL SPRAY    instill 2 sprays into each nostril once daily    IBUPROFEN (ADVIL,MOTRIN) 600 MG TABLET    Take 1 tablet (600 mg total) by mouth every 6 (six) hours as needed for Pain.    MULTIVITS-MIN/IRON/FA/LUTEIN (ULTIMATE WOMEN'S COMPLETE 50+ ORAL)    Take by mouth.    VITAMIN D2 50,000 UNIT CAPSULE    Take 50,000 Units by mouth every 7 days.   Discontinued Medications    CIPROFLOXACIN HCL (CILOXAN) 0.3 % OPHTHALMIC SOLUTION        DEXTROMETHORPHAN-GUAIFENESIN  MG (MUCINEX DM)  MG PER 12 HR TABLET    Take 1 tablet by mouth every 12 (twelve) hours.    DUREZOL 0.05 % DROP OPHTHALMIC SOLUTION        KETOROLAC 0.5% (ACULAR) 0.5 % DROP        MULTIVIT-MINERAL-IRON-LUTEIN TAB    Take by mouth.    ONDANSETRON (ZOFRAN-ODT) 8 MG TBDL    Take 1 tablet (8 mg total) by mouth every 6 (six) hours as needed (nausea).    OXYBUTYNIN (DITROPAN) 5 MG TAB    Take 1 tablet (5 mg total) by mouth 3 (three) times daily as needed (bladder spasms).

## 2018-01-15 DIAGNOSIS — E55.9 VITAMIN D DEFICIENCY: ICD-10-CM

## 2018-01-15 DIAGNOSIS — E78.00 HYPERCHOLESTEROLEMIA: Primary | ICD-10-CM

## 2018-01-15 RX ORDER — ROSUVASTATIN CALCIUM 5 MG/1
5 TABLET, COATED ORAL DAILY
Qty: 90 TABLET | Refills: 1 | Status: SHIPPED | OUTPATIENT
Start: 2018-01-15 | End: 2019-06-12

## 2018-01-15 RX ORDER — ERGOCALCIFEROL 1.25 MG/1
50000 CAPSULE ORAL
Qty: 12 CAPSULE | Refills: 1 | Status: SHIPPED | OUTPATIENT
Start: 2018-01-15 | End: 2019-01-10

## 2018-02-20 DIAGNOSIS — R06.00 DYSPNEA, UNSPECIFIED TYPE: Primary | ICD-10-CM

## 2018-02-21 ENCOUNTER — OFFICE VISIT (OUTPATIENT)
Dept: PULMONOLOGY | Facility: CLINIC | Age: 68
End: 2018-02-21
Payer: MEDICARE

## 2018-02-21 ENCOUNTER — HOSPITAL ENCOUNTER (OUTPATIENT)
Dept: PULMONOLOGY | Facility: CLINIC | Age: 68
Discharge: HOME | End: 2018-02-21
Payer: MEDICARE

## 2018-02-21 VITALS
WEIGHT: 147 LBS | BODY MASS INDEX: 27.05 KG/M2 | OXYGEN SATURATION: 98 % | HEIGHT: 62 IN | SYSTOLIC BLOOD PRESSURE: 116 MMHG | DIASTOLIC BLOOD PRESSURE: 66 MMHG | HEART RATE: 81 BPM

## 2018-02-21 DIAGNOSIS — J40 BRONCHITIS: Primary | ICD-10-CM

## 2018-02-21 DIAGNOSIS — R91.8 ABNORMAL CT SCAN, LUNG: ICD-10-CM

## 2018-02-21 DIAGNOSIS — R91.1 LUNG NODULE: ICD-10-CM

## 2018-02-21 DIAGNOSIS — R06.00 DYSPNEA, UNSPECIFIED TYPE: ICD-10-CM

## 2018-02-21 LAB
PRE FEV1 FVC: 70
PRE FEV1: 1.42
PRE FVC: 2.04
PREDICTED FEV1 FVC: 80
PREDICTED FEV1: 2.12
PREDICTED FVC: 2.69

## 2018-02-21 PROCEDURE — 1159F MED LIST DOCD IN RCRD: CPT | Mod: S$GLB,,, | Performed by: INTERNAL MEDICINE

## 2018-02-21 PROCEDURE — 99204 OFFICE O/P NEW MOD 45 MIN: CPT | Mod: S$GLB,,, | Performed by: INTERNAL MEDICINE

## 2018-02-21 PROCEDURE — 99999 PR PBB SHADOW E&M-EST. PATIENT-LVL III: CPT | Mod: PBBFAC,,, | Performed by: INTERNAL MEDICINE

## 2018-02-21 PROCEDURE — 94010 BREATHING CAPACITY TEST: CPT | Mod: S$GLB,,, | Performed by: INTERNAL MEDICINE

## 2018-02-21 PROCEDURE — 3008F BODY MASS INDEX DOCD: CPT | Mod: S$GLB,,, | Performed by: INTERNAL MEDICINE

## 2018-02-21 PROCEDURE — 1126F AMNT PAIN NOTED NONE PRSNT: CPT | Mod: S$GLB,,, | Performed by: INTERNAL MEDICINE

## 2018-02-21 RX ORDER — BENZONATATE 200 MG/1
1 CAPSULE ORAL 3 TIMES DAILY PRN
Refills: 0 | COMMUNITY
Start: 2018-02-03 | End: 2018-05-31

## 2018-02-21 NOTE — PROGRESS NOTES
Subjective:       Patient ID: Concha Hess is a 67 y.o. female.    Chief Complaint: Abnormal Chest X-ray and COPD    HPI  68 yo female comes for concern about an abnormal CT report and follow up on a recent bout of bronchitis. She has seen Dr. Loomis in [place] and her CT  Done in August  Report states that a 3  mm nodule in the right middle lobe had enlarged to 4 mm on a year follow up CT.  She was upset that this finding had not been reviewed with her. She is feeling better regarding  her bronchitis and her PFT's today are Fev -1: 1.42 liters 70%.   I don't think that she needs to use a BREO inhaler at this time with such normal function.       No flowsheet data found.]  Review of Systems   Constitutional: Negative.    HENT: Negative.    Eyes: Negative.    Respiratory: Negative.         Abnormal CT 4 mm nodule in RML    Recent bout of bronchitis   Cardiovascular: Negative.    Genitourinary: Negative.    Musculoskeletal: Negative.    Skin: Negative.    Gastrointestinal: Negative.    Neurological: Negative.    Psychiatric/Behavioral: Negative.        Objective:      Physical Exam   Constitutional: She is oriented to person, place, and time. She appears well-developed and well-nourished. No distress.   HENT:   Head: Normocephalic and atraumatic.   Right Ear: External ear normal.   Left Ear: External ear normal.   Nose: Nose normal.   Mouth/Throat: Oropharynx is clear and moist.   Eyes: Conjunctivae and EOM are normal. Pupils are equal, round, and reactive to light.   Neck: Normal range of motion. Neck supple. No JVD present. No thyromegaly present.   Cardiovascular: Normal rate, regular rhythm, normal heart sounds and intact distal pulses.  Exam reveals no gallop.    No murmur heard.  Pulmonary/Chest: Breath sounds normal. No stridor. No respiratory distress. She has no wheezes. She has no rales. She exhibits no tenderness.   Clear without wheezes or rales.   Abdominal: Soft. Bowel sounds are normal. She  exhibits no distension and no mass. There is no tenderness. There is no rebound and no guarding.   Musculoskeletal: Normal range of motion. She exhibits no edema.   Lymphadenopathy:     She has no cervical adenopathy.   Neurological: She is alert and oriented to person, place, and time. She has normal reflexes. She displays normal reflexes. No cranial nerve deficit.   Skin: Skin is warm and dry. No rash noted.   Psychiatric: She has a normal mood and affect. Her behavior is normal. Judgment and thought content normal.   Nursing note and vitals reviewed.          Assessment:       No diagnosis found.    Outpatient Encounter Prescriptions as of 2/21/2018   Medication Sig Dispense Refill    aspirin (ECOTRIN) 81 MG EC tablet Take 81 mg by mouth once daily.      BREO ELLIPTA 100-25 mcg/dose diskus inhaler 1 puff once daily.   0    cetirizine 10 mg chewable tablet Take 10 mg by mouth once daily.      clorazepate (TRANXENE) 7.5 MG Tab take 1 tablet by mouth twice a day if needed 60 tablet 3    diclofenac (CATAFLAM) 50 MG tablet take 1 tablet by mouth twice a day 60 tablet 3    ergocalciferol (ERGOCALCIFEROL) 50,000 unit Cap Take 1 capsule (50,000 Units total) by mouth every 7 days. 12 capsule 1    escitalopram (LEXAPRO) 10 MG tablet 10 mg every evening.       esomeprazole (NEXIUM) 20 MG capsule Take 20 mg by mouth before breakfast.      estradiol (ESTRACE) 0.01 % (0.1 mg/gram) vaginal cream Use 0.5 gram of estrogen cream in vagina nightly x 2 weeks, then twice a week thereafter 42.5 g 3    fluticasone (FLONASE) 50 mcg/actuation nasal spray instill 2 sprays into each nostril once daily 16 g 11    ibuprofen (ADVIL,MOTRIN) 600 MG tablet Take 1 tablet (600 mg total) by mouth every 6 (six) hours as needed for Pain. 20 tablet 0    MULTIVITS-MIN/IRON/FA/LUTEIN (ULTIMATE WOMEN'S COMPLETE 50+ ORAL) Take by mouth.      rosuvastatin (CRESTOR) 5 MG tablet Take 1 tablet (5 mg total) by mouth once daily. 90 tablet 1     No  facility-administered encounter medications on file as of 2/21/2018.      No orders of the defined types were placed in this encounter.    Plan:       Reassured no significant change of the small RML nodule. Will do a  Repeat CT in August which will arden a two years of surveiallance

## 2018-03-19 RX ORDER — CLORAZEPATE DIPOTASSIUM 7.5 MG/1
TABLET ORAL
Qty: 60 TABLET | Refills: 3 | Status: SHIPPED | OUTPATIENT
Start: 2018-03-19 | End: 2018-09-28 | Stop reason: SDUPTHER

## 2018-03-21 ENCOUNTER — TELEPHONE (OUTPATIENT)
Dept: UROGYNECOLOGY | Facility: CLINIC | Age: 68
End: 2018-03-21

## 2018-03-21 NOTE — TELEPHONE ENCOUNTER
----- Message from Jose E Freeman sent at 3/21/2018  2:24 PM CDT -----  Contact: Pt  X_ 1st Request  _ 2nd Request  _ 3rd Request    Who: LICO ASENCIO [[id number]]    Why: Patient would like to make a follow up appointment with the doctor. Patient refused Np.    What Number to Call Back: [phone]    When to Expect a call back: (Before the end of the day)  -- if call after 3:00 call back will be tomorrow.

## 2018-04-06 ENCOUNTER — OFFICE VISIT (OUTPATIENT)
Dept: URGENT CARE | Facility: CLINIC | Age: 68
End: 2018-04-06
Payer: MEDICARE

## 2018-04-06 VITALS
WEIGHT: 147 LBS | HEIGHT: 62 IN | RESPIRATION RATE: 18 BRPM | DIASTOLIC BLOOD PRESSURE: 75 MMHG | TEMPERATURE: 97 F | OXYGEN SATURATION: 96 % | BODY MASS INDEX: 27.05 KG/M2 | HEART RATE: 76 BPM | SYSTOLIC BLOOD PRESSURE: 112 MMHG

## 2018-04-06 DIAGNOSIS — R30.0 DYSURIA: ICD-10-CM

## 2018-04-06 DIAGNOSIS — N30.00 ACUTE CYSTITIS WITHOUT HEMATURIA: Primary | ICD-10-CM

## 2018-04-06 LAB
BILIRUB UR QL STRIP: NEGATIVE
GLUCOSE UR QL STRIP: NEGATIVE
KETONES UR QL STRIP: NEGATIVE
LEUKOCYTE ESTERASE UR QL STRIP: POSITIVE
PH, POC UA: 8 (ref 5–8)
POC BLOOD, URINE: NEGATIVE
POC NITRATES, URINE: NEGATIVE
PROT UR QL STRIP: POSITIVE
SP GR UR STRIP: 1.01 (ref 1–1.03)
UROBILINOGEN UR STRIP-ACNC: ABNORMAL (ref 0.1–1.1)

## 2018-04-06 PROCEDURE — 81003 URINALYSIS AUTO W/O SCOPE: CPT | Mod: QW,S$GLB,, | Performed by: NURSE PRACTITIONER

## 2018-04-06 PROCEDURE — 96372 THER/PROPH/DIAG INJ SC/IM: CPT | Mod: S$GLB,,, | Performed by: EMERGENCY MEDICINE

## 2018-04-06 PROCEDURE — 99214 OFFICE O/P EST MOD 30 MIN: CPT | Mod: 25,S$GLB,, | Performed by: NURSE PRACTITIONER

## 2018-04-06 RX ORDER — CEFTRIAXONE 1 G/1
1 INJECTION, POWDER, FOR SOLUTION INTRAMUSCULAR; INTRAVENOUS
Status: COMPLETED | OUTPATIENT
Start: 2018-04-06 | End: 2018-04-06

## 2018-04-06 RX ORDER — SULFAMETHOXAZOLE AND TRIMETHOPRIM 800; 160 MG/1; MG/1
1 TABLET ORAL 2 TIMES DAILY
Qty: 14 TABLET | Refills: 0 | Status: SHIPPED | OUTPATIENT
Start: 2018-04-06 | End: 2018-04-13

## 2018-04-06 RX ORDER — PHENAZOPYRIDINE HYDROCHLORIDE 200 MG/1
200 TABLET, FILM COATED ORAL
Qty: 9 TABLET | Refills: 0 | Status: SHIPPED | OUTPATIENT
Start: 2018-04-06 | End: 2018-04-09

## 2018-04-06 RX ORDER — DOXYCYCLINE 100 MG/1
100 CAPSULE ORAL 2 TIMES DAILY
Refills: 0 | COMMUNITY
Start: 2018-01-23 | End: 2018-05-31

## 2018-04-06 RX ADMIN — CEFTRIAXONE 1 G: 1 INJECTION, POWDER, FOR SOLUTION INTRAMUSCULAR; INTRAVENOUS at 11:04

## 2018-04-06 NOTE — PATIENT INSTRUCTIONS
"Rocephin given in clinic today.   Bactrim DS 2 times per day for 7 days.   Pyridium as needed for symptom control.   Follow up with urologist if s/s do not improve    Please return here or go to the Emergency Department for any concerns or worsening of condition.  If you were prescribed antibiotics, please take them to completion.  If you were prescribed a narcotic medication, do not drive or operate heavy equipment or machinery while taking these medications.  Please follow up with your primary care doctor or specialist as needed.  Please drink plenty of fluids.  Please get plenty of rest.  If you were prescribed Pyridium (phenazopyridine), please be aware that if you wear contact lens that this medication may stain your contacts.  While taking this medication it is recommended that you do not wear your contacts until 24 hours after your last dose.  Please follow up with your primary care doctor or specialist as needed.    If you  smoke, please stop smoking.        Bladder Infection, Female (Adult)    Urine is normally doesn't have any bacteria in it. But bacteria can get into the urinary tract from the skin around the rectum. Or they can travel in the blood from elsewhere in the body. Once they are in your urinary tract, they can cause infection in the urethra (urethritis), the bladder (cystitis), or the kidneys (pyelonephritis).  The most common place for an infection is in the bladder. This is called a bladder infection. This is one of the most common infections in women. Most bladder infections are easily treated. They are not serious unless the infection spreads to the kidney.  The phrases "bladder infection," "UTI," and "cystitis" are often used to describe the same thing. But they are not always the same. Cystitis is an inflammation of the bladder. The most common cause of cystitis is an infection.  Symptoms  The infection causes inflammation in the urethra and bladder. This causes many of the symptoms. The " most common symptoms of a bladder infection are:  · Pain or burning when urinating  · Having to urinate more often than usual  · Urgent need to urinate  · Only a small amount of urine comes out  · Blood in urine  · Abdominal discomfort. This is usually in the lower abdomen above the pubic bone.  · Cloudy urine  · Strong- or bad-smelling urine  · Unable to urinate (urinary retention)  · Unable to hold urine in (urinary incontinence)  · Fever  · Loss of appetite  · Confusion (in older adults)  Causes  Bladder infections are not contagious. You can't get one from someone else, from a toilet seat, or from sharing a bath.  The most common cause of bladder infections is bacteria from the bowels. The bacteria get onto the skin around the opening of the urethra. From there, they can get into the urine and travel up to the bladder, causing inflammation and infection. This usually happens because of:  · Wiping improperly after urinating. Always wipe from front to back.  · Bowel incontinence  · Pregnancy  · Procedures such as having a catheter inserted  · Older age  · Not emptying your bladder. This can allow bacteria a chance to grow in your urine.  · Dehydration  · Constipation  · Sex  · Use of a diaphragm for birth control   Treatment  Bladder infections are diagnosed by a urine test. They are treated with antibiotics and usually clear up quickly without complications. Treatment helps prevent a more serious kidney infection.  Medicines  Medicines can help in the treatment of a bladder infection:  · Take antibiotics until they are used up, even if you feel better. It is important to finish them to make sure the infection has cleared.  · You can use acetaminophen or ibuprofen for pain, fever, or discomfort, unless another medicine was prescribed. If you have chronic liver or kidney disease, talk with your healthcare provider before using these medicines. Also talk with your provider if you've ever had a stomach ulcer or  gastrointestinal bleeding, or are taking blood-thinner medicines.  · If you are given phenazopydridine to reduce burning with urination, it will cause your urine to become a bright orange color. This can stain clothing.  Care and prevention  These self-care steps can help prevent future infections:  · Drink plenty of fluids to prevent dehydration and flush out your bladder. Do this unless you must restrict fluids for other health reasons, or your doctor told you not to.  · Proper cleaning after going to the bathroom is important. Wipe from front to back after using the toilet to prevent the spread of bacteria.  · Urinate more often. Don't try to hold urine in for a long time.  · Wear loose-fitting clothes and cotton underwear. Avoid tight-fitting pants.  · Improve your diet and prevent constipation. Eat more fresh fruit and vegetables, and fiber, and less junk and fatty foods.  · Avoid sex until your symptoms are gone.  · Avoid caffeine, alcohol, and spicy foods. These can irritate your bladder.  · Urinate right after intercourse to flush out your bladder.  · If you use birth control pills and have frequent bladder infections, discuss it with your doctor.  Follow-up care  Call your healthcare provider if all symptoms are not gone after 3 days of treatment. This is especially important if you have repeat infections.  If a culture was done, you will be told if your treatment needs to be changed. If directed, you can call to find out the results.  If X-rays were done, you will be told if the results will affect your treatment.  Call 911  Call 911 if any of the following occur:  · Trouble breathing  · Hard to wake up or confusion  · Fainting or loss of consciousness  · Rapid heart rate  When to seek medical advice  Call your healthcare provider right away if any of these occur:  · Fever of 100.4ºF (38.0ºC) or higher, or as directed by your healthcare provider  · Symptoms are not better by the third day of  treatment  · Back or belly (abdominal) pain that gets worse  · Repeated vomiting, or unable to keep medicine down  · Weakness or dizziness  · Vaginal discharge  · Pain, redness, or swelling in the outer vaginal area (labia)  Date Last Reviewed: 10/1/2016  © 7156-7361 [employer]. [address] [place] [address]. All rights reserved. This information is not intended as a substitute for professional medical care. Always follow your healthcare professional's instructions.

## 2018-04-06 NOTE — PROGRESS NOTES
"Subjective:       Patient ID: Concha Hess is a 67 y.o. female.    Vitals:    04/06/18 1118   BP: 112/75   Pulse: 76   Resp: 18   Temp: 97 °F (36.1 °C)   TempSrc: Oral   SpO2: 96%   Weight: 66.7 kg (147 lb)   Height: 5' 2" (1.575 m)       Chief Complaint: Urinary Tract Infection    Pt states last night she started feeling the pressure. Pt states she woke up and emptied her bladder and she felt pressure and burning that makes her eyes water.  She reports severe dysuria and having to use the bathroom every couple of minutes due to incomplete emptying and urgency.    Denies fever, chills, flank pain, N/V, vaginal discharge.       Urinary Tract Infection    This is a new problem. The current episode started today. The problem occurs every urination. The problem has been unchanged. The quality of the pain is described as burning. The pain is at a severity of 8/10. The pain is moderate. There has been no fever. The fever has been present for less than 1 day. She is not sexually active. There is no history of pyelonephritis. Associated symptoms include frequency and urgency. Pertinent negatives include no chills, hematuria, nausea or vomiting. She has tried nothing for the symptoms. The treatment provided no relief.     Review of Systems   Constitution: Negative for chills and fever.   Skin: Negative for itching.   Musculoskeletal: Negative for back pain.   Gastrointestinal: Negative for abdominal pain, nausea and vomiting.   Genitourinary: Positive for dysuria, frequency, incomplete emptying and urgency. Negative for genital sores, hematuria, missed menses and non-menstrual bleeding.       Objective:      Physical Exam   Constitutional: She is oriented to person, place, and time. She appears well-developed and well-nourished.   Appears to be very uncomfortable. Constantly running back and forth to the restroom.    HENT:   Head: Normocephalic and atraumatic.   Right Ear: External ear normal.   Left Ear: External ear " normal.   Nose: Nose normal. No nasal deformity. No epistaxis.   Mouth/Throat: Oropharynx is clear and moist and mucous membranes are normal.   Eyes: Conjunctivae and lids are normal.   Neck: Trachea normal, normal range of motion and phonation normal. Neck supple.   Cardiovascular: Normal rate, regular rhythm, normal heart sounds and normal pulses.    Pulmonary/Chest: Effort normal and breath sounds normal.   Abdominal: Soft. Normal appearance and bowel sounds are normal. She exhibits no distension and no mass. There is no tenderness. There is no CVA tenderness.   Neurological: She is alert and oriented to person, place, and time.   Skin: Skin is warm, dry and intact.   Psychiatric: She has a normal mood and affect. Her speech is normal and behavior is normal. Cognition and memory are normal.   Nursing note and vitals reviewed.        Results for orders placed or performed in visit on 04/06/18   POCT Urinalysis, Dipstick, Automated, W/O Scope   Result Value Ref Range    POC Blood, Urine Negative Negative    POC Bilirubin, Urine Negative Negative    POC Urobilinogen, Urine norm 0.1 - 1.1    POC Ketones, Urine Negative Negative    POC Protein, Urine Positive (A) Negative    POC Nitrates, Urine Negative Negative    POC Glucose, Urine Negative Negative    pH, UA 8.0 5 - 8    POC Specific Gravity, Urine 1.015 1.003 - 1.029    POC Leukocytes, Urine Positive (A) Negative       Assessment:       1. Acute cystitis without hematuria    2. Dysuria        Plan:       Concha was seen today for urinary tract infection.    Diagnoses and all orders for this visit:    Acute cystitis without hematuria  -     cefTRIAXone injection 1 g; Inject 1 g into the muscle one time.  -     sulfamethoxazole-trimethoprim 800-160mg (BACTRIM DS) 800-160 mg Tab; Take 1 tablet by mouth 2 (two) times daily.  -     Urine culture  -     phenazopyridine (PYRIDIUM) 200 MG tablet; Take 1 tablet (200 mg total) by mouth 3 (three) times daily with  "meals.    Dysuria  -     POCT Urinalysis, Dipstick, Automated, W/O Scope  -     phenazopyridine (PYRIDIUM) 200 MG tablet; Take 1 tablet (200 mg total) by mouth 3 (three) times daily with meals.        Requesting an IM injection in clinic today.     Patient Instructions   Rocephin given in clinic today.   Bactrim DS 2 times per day for 7 days.   Pyridium as needed for symptom control.   Follow up with urologist if s/s do not improve    Please return here or go to the Emergency Department for any concerns or worsening of condition.  If you were prescribed antibiotics, please take them to completion.  If you were prescribed a narcotic medication, do not drive or operate heavy equipment or machinery while taking these medications.  Please follow up with your primary care doctor or specialist as needed.  Please drink plenty of fluids.  Please get plenty of rest.  If you were prescribed Pyridium (phenazopyridine), please be aware that if you wear contact lens that this medication may stain your contacts.  While taking this medication it is recommended that you do not wear your contacts until 24 hours after your last dose.  Please follow up with your primary care doctor or specialist as needed.    If you  smoke, please stop smoking.        Bladder Infection, Female (Adult)    Urine is normally doesn't have any bacteria in it. But bacteria can get into the urinary tract from the skin around the rectum. Or they can travel in the blood from elsewhere in the body. Once they are in your urinary tract, they can cause infection in the urethra (urethritis), the bladder (cystitis), or the kidneys (pyelonephritis).  The most common place for an infection is in the bladder. This is called a bladder infection. This is one of the most common infections in women. Most bladder infections are easily treated. They are not serious unless the infection spreads to the kidney.  The phrases "bladder infection," "UTI," and "cystitis" are often " used to describe the same thing. But they are not always the same. Cystitis is an inflammation of the bladder. The most common cause of cystitis is an infection.  Symptoms  The infection causes inflammation in the urethra and bladder. This causes many of the symptoms. The most common symptoms of a bladder infection are:  · Pain or burning when urinating  · Having to urinate more often than usual  · Urgent need to urinate  · Only a small amount of urine comes out  · Blood in urine  · Abdominal discomfort. This is usually in the lower abdomen above the pubic bone.  · Cloudy urine  · Strong- or bad-smelling urine  · Unable to urinate (urinary retention)  · Unable to hold urine in (urinary incontinence)  · Fever  · Loss of appetite  · Confusion (in older adults)  Causes  Bladder infections are not contagious. You can't get one from someone else, from a toilet seat, or from sharing a bath.  The most common cause of bladder infections is bacteria from the bowels. The bacteria get onto the skin around the opening of the urethra. From there, they can get into the urine and travel up to the bladder, causing inflammation and infection. This usually happens because of:  · Wiping improperly after urinating. Always wipe from front to back.  · Bowel incontinence  · Pregnancy  · Procedures such as having a catheter inserted  · Older age  · Not emptying your bladder. This can allow bacteria a chance to grow in your urine.  · Dehydration  · Constipation  · Sex  · Use of a diaphragm for birth control   Treatment  Bladder infections are diagnosed by a urine test. They are treated with antibiotics and usually clear up quickly without complications. Treatment helps prevent a more serious kidney infection.  Medicines  Medicines can help in the treatment of a bladder infection:  · Take antibiotics until they are used up, even if you feel better. It is important to finish them to make sure the infection has cleared.  · You can use  acetaminophen or ibuprofen for pain, fever, or discomfort, unless another medicine was prescribed. If you have chronic liver or kidney disease, talk with your healthcare provider before using these medicines. Also talk with your provider if you've ever had a stomach ulcer or gastrointestinal bleeding, or are taking blood-thinner medicines.  · If you are given phenazopydridine to reduce burning with urination, it will cause your urine to become a bright orange color. This can stain clothing.  Care and prevention  These self-care steps can help prevent future infections:  · Drink plenty of fluids to prevent dehydration and flush out your bladder. Do this unless you must restrict fluids for other health reasons, or your doctor told you not to.  · Proper cleaning after going to the bathroom is important. Wipe from front to back after using the toilet to prevent the spread of bacteria.  · Urinate more often. Don't try to hold urine in for a long time.  · Wear loose-fitting clothes and cotton underwear. Avoid tight-fitting pants.  · Improve your diet and prevent constipation. Eat more fresh fruit and vegetables, and fiber, and less junk and fatty foods.  · Avoid sex until your symptoms are gone.  · Avoid caffeine, alcohol, and spicy foods. These can irritate your bladder.  · Urinate right after intercourse to flush out your bladder.  · If you use birth control pills and have frequent bladder infections, discuss it with your doctor.  Follow-up care  Call your healthcare provider if all symptoms are not gone after 3 days of treatment. This is especially important if you have repeat infections.  If a culture was done, you will be told if your treatment needs to be changed. If directed, you can call to find out the results.  If X-rays were done, you will be told if the results will affect your treatment.  Call 911  Call 911 if any of the following occur:  · Trouble breathing  · Hard to wake up or confusion  · Fainting or loss  of consciousness  · Rapid heart rate  When to seek medical advice  Call your healthcare provider right away if any of these occur:  · Fever of 100.4ºF (38.0ºC) or higher, or as directed by your healthcare provider  · Symptoms are not better by the third day of treatment  · Back or belly (abdominal) pain that gets worse  · Repeated vomiting, or unable to keep medicine down  · Weakness or dizziness  · Vaginal discharge  · Pain, redness, or swelling in the outer vaginal area (labia)  Date Last Reviewed: 10/1/2016  © 3818-1117 [employer]. [address] [place] [address]. All rights reserved. This information is not intended as a substitute for professional medical care. Always follow your healthcare professional's instructions.

## 2018-04-08 ENCOUNTER — TELEPHONE (OUTPATIENT)
Dept: URGENT CARE | Facility: CLINIC | Age: 68
End: 2018-04-08

## 2018-04-08 LAB
BACTERIA UR CULT: NO GROWTH
BACTERIA UR CULT: NORMAL

## 2018-04-09 ENCOUNTER — TELEPHONE (OUTPATIENT)
Dept: URGENT CARE | Facility: CLINIC | Age: 68
End: 2018-04-09

## 2018-05-31 ENCOUNTER — OFFICE VISIT (OUTPATIENT)
Dept: URGENT CARE | Facility: CLINIC | Age: 68
End: 2018-05-31
Payer: MEDICARE

## 2018-05-31 VITALS
DIASTOLIC BLOOD PRESSURE: 88 MMHG | HEIGHT: 62 IN | OXYGEN SATURATION: 99 % | BODY MASS INDEX: 27.05 KG/M2 | WEIGHT: 147 LBS | TEMPERATURE: 98 F | SYSTOLIC BLOOD PRESSURE: 121 MMHG | RESPIRATION RATE: 18 BRPM | HEART RATE: 88 BPM

## 2018-05-31 DIAGNOSIS — N61.1 ABSCESS OF SKIN OF BREAST: Primary | ICD-10-CM

## 2018-05-31 PROCEDURE — 10060 I&D ABSCESS SIMPLE/SINGLE: CPT | Mod: S$GLB,,, | Performed by: NURSE PRACTITIONER

## 2018-05-31 PROCEDURE — 99214 OFFICE O/P EST MOD 30 MIN: CPT | Mod: 25,S$GLB,, | Performed by: NURSE PRACTITIONER

## 2018-05-31 RX ORDER — SULFAMETHOXAZOLE AND TRIMETHOPRIM 400; 80 MG/1; MG/1
1 TABLET ORAL DAILY
Qty: 10 TABLET | Refills: 0 | Status: SHIPPED | OUTPATIENT
Start: 2018-05-31 | End: 2018-06-10

## 2018-05-31 RX ORDER — MUPIROCIN 20 MG/G
OINTMENT TOPICAL 2 TIMES DAILY
Qty: 1 TUBE | Refills: 0 | Status: SHIPPED | OUTPATIENT
Start: 2018-05-31 | End: 2018-06-10

## 2018-05-31 NOTE — PATIENT INSTRUCTIONS
KEEP SITE COVERED FOR 24 HOURS.  APPLY BACTROBAN TWICE DAILY AND AVOID SUBMERGING IN WATER.  TAKE RX BACTRIM FOR FULL 10 DAYS.    Follow up with your doctor in a few days.  Return to the urgent care or go to the ER if symptoms get worse.    IF THIS RE-OCCURS, YOU MAY NEED A INCISION AND DRAINAGE UNDER LOCAL ANESTHETIC. YOU CAN RETURN TO AN URGENT CARE FOR THIS.    AVOID HARD WIRE BRAS.      Abscess (Incision & Drainage)  An abscess is sometimes called a boil. It happens when bacteria get trapped under the skin and start to grow. Pus forms inside the abscess as the body responds to the bacteria. An abscess can happen with an insect bite, ingrown hair, blocked oil gland, pimple, cyst, or puncture wound.  Your healthcare provider has drained the pus from your abscess. If the abscess pocket was large, your healthcare provider may have put in gauze packing. Your provider will need to remove it on your next visit. He or she may also replace it at that time. You may not need antibiotics to treat a simple abscess, unless the infection is spreading into the skin around the wound (cellulitis).  The wound will take about 1 to 2 weeks to heal, depending on the size of the abscess. Healthy tissue will grow from the bottom and sides of the opening until it seals over.  Home care  These tips can help your wound heal:  · The wound may drain for the first 2 days. Cover the wound with a clean dry dressing. Change the dressing if it becomes soaked with blood or pus.  · If a gauze packing was placed inside the abscess pocket, you may be told to remove it yourself. You may do this in the shower. Once the packing is removed, you should wash the area in the shower, or clean the area as directed by your provider. Continue to do this until the skin opening has closed. Make sure you wash your hands after changing the packing or cleaning the wound.  · If you were prescribed antibiotics, take them as directed until they are all gone.  · You  may use acetaminophen or ibuprofen to control pain, unless another pain medicine was prescribed. If you have liver disease or ever had a stomach ulcer, talk with your doctor before using these medicines.  Follow-up care  Follow up with your healthcare provider, or as advised. If a gauze packing was put in your wound, it should be removed in 1 to 2 days. Check your wound every day for any signs that the infection is getting worse. The signs are listed below.  When to seek medical advice  Call your healthcare provider right away if any of these occur:  · Increasing redness or swelling  · Red streaks in the skin leading away from the wound  · Increasing local pain or swelling  · Continued pus draining from the wound 2 days after treatment  · Fever of 100.4ºF (38ºC) or higher, or as directed by your healthcare provider  · Boil returns when you are at home  Date Last Reviewed: 9/1/2016  © 1934-9102 [employer]. [address] [place] [address], [place] [address]. All rights reserved. This information is not intended as a substitute for professional medical care. Always follow your healthcare professional's instructions.

## 2018-05-31 NOTE — PROGRESS NOTES
"Subjective:       Patient ID: Concha Hess is a 67 y.o. female.    Vitals:    05/31/18 0957   BP: 121/88   Pulse: 88   Resp: 18   Temp: 98.2 °F (36.8 °C)   SpO2: 99%   Weight: 66.7 kg (147 lb)   Height: 5' 2" (1.575 m)       Chief Complaint: Breast Pain (RT 2 DAYS NOW )    AUGMENTATION 2 YEARS AGO. RECENTLY WORE A UNDERWIRE BRA AND IRRITATED THE INCISION LINE      Other   This is a new problem. The current episode started yesterday. The problem occurs constantly. The problem has been gradually worsening. Pertinent negatives include no abdominal pain, chest pain, chills, fever, headaches, nausea, rash, sore throat or vomiting. Nothing aggravates the symptoms. She has tried nothing for the symptoms.     Review of Systems   Constitution: Negative for chills and fever.   HENT: Negative for sore throat.    Eyes: Negative for blurred vision.   Cardiovascular: Negative for chest pain.   Respiratory: Negative for shortness of breath.    Skin: Negative for rash.   Musculoskeletal: Negative for back pain and joint pain.   Gastrointestinal: Negative for abdominal pain, diarrhea, nausea and vomiting.   Neurological: Negative for headaches.   Psychiatric/Behavioral: The patient is not nervous/anxious.          Objective:      Physical Exam   Constitutional: She is oriented to person, place, and time. She appears well-developed and well-nourished.   HENT:   Head: Normocephalic and atraumatic. Head is without abrasion, without contusion and without laceration.   Right Ear: External ear normal.   Left Ear: External ear normal.   Nose: Nose normal.   Mouth/Throat: Oropharynx is clear and moist.   Eyes: Conjunctivae, EOM and lids are normal. Pupils are equal, round, and reactive to light.   Neck: Trachea normal, full passive range of motion without pain and phonation normal. Neck supple.   Cardiovascular: Normal rate, regular rhythm and normal heart sounds.    Pulmonary/Chest: Effort normal and breath sounds normal. No " stridor. No respiratory distress.   Musculoskeletal: Normal range of motion.   Neurological: She is alert and oriented to person, place, and time.   Skin: Skin is warm, dry and intact. Capillary refill takes less than 2 seconds. Lesion and rash noted. No abrasion, no bruising, no burn, no ecchymosis and no laceration noted. Rash is pustular. No erythema.        [id number] RAISED RED LESION WITH 2 PURULENT HEADS. MILD TTP.    Psychiatric: She has a normal mood and affect. Her speech is normal and behavior is normal. Judgment and thought content normal. Cognition and memory are normal.   Nursing note and vitals reviewed.    Incision & Drainage  Date/Time: 5/31/2018 12:41 PM  Performed by: BARBARA LEVINE.  Authorized by: BARBARA LEVINE       Type:  Abscess  Body area:  Trunk  Location details:  Right breast  Scalpel size: 18G NEEDLE.  Complexity:  Simple  Drainage:  Pus and bloody  Drainage amount:  Scant  Wound treatment:  Expression of material  Packing material:  None  Patient tolerance:  Patient tolerated the procedure well with no immediate complications      Assessment:       1. Abscess of skin of breast        Plan:       Concha was seen today for breast pain.    Diagnoses and all orders for this visit:    Abscess of skin of breast  -     mupirocin (BACTROBAN) 2 % ointment; Apply topically 2 (two) times daily.  -     sulfamethoxazole-trimethoprim 400-80mg (BACTRIM,SEPTRA) 400-80 mg per tablet; Take 1 tablet by mouth once daily.    Other orders  -     INCISION AND DRAINAGE      Patient Instructions   KEEP SITE COVERED FOR 24 HOURS.  APPLY BACTROBAN TWICE DAILY AND AVOID SUBMERGING IN WATER.  TAKE RX BACTRIM FOR FULL 10 DAYS.    Follow up with your doctor in a few days.  Return to the urgent care or go to the ER if symptoms get worse.    IF THIS RE-OCCURS, YOU MAY NEED A INCISION AND DRAINAGE UNDER LOCAL ANESTHETIC. YOU CAN RETURN TO AN URGENT CARE FOR THIS.    AVOID HARD WIRE BRAS.      Abscess  (Incision & Drainage)  An abscess is sometimes called a boil. It happens when bacteria get trapped under the skin and start to grow. Pus forms inside the abscess as the body responds to the bacteria. An abscess can happen with an insect bite, ingrown hair, blocked oil gland, pimple, cyst, or puncture wound.  Your healthcare provider has drained the pus from your abscess. If the abscess pocket was large, your healthcare provider may have put in gauze packing. Your provider will need to remove it on your next visit. He or she may also replace it at that time. You may not need antibiotics to treat a simple abscess, unless the infection is spreading into the skin around the wound (cellulitis).  The wound will take about 1 to 2 weeks to heal, depending on the size of the abscess. Healthy tissue will grow from the bottom and sides of the opening until it seals over.  Home care  These tips can help your wound heal:  · The wound may drain for the first 2 days. Cover the wound with a clean dry dressing. Change the dressing if it becomes soaked with blood or pus.  · If a gauze packing was placed inside the abscess pocket, you may be told to remove it yourself. You may do this in the shower. Once the packing is removed, you should wash the area in the shower, or clean the area as directed by your provider. Continue to do this until the skin opening has closed. Make sure you wash your hands after changing the packing or cleaning the wound.  · If you were prescribed antibiotics, take them as directed until they are all gone.  · You may use acetaminophen or ibuprofen to control pain, unless another pain medicine was prescribed. If you have liver disease or ever had a stomach ulcer, talk with your doctor before using these medicines.  Follow-up care  Follow up with your healthcare provider, or as advised. If a gauze packing was put in your wound, it should be removed in 1 to 2 days. Check your wound every day for any signs that  the infection is getting worse. The signs are listed below.  When to seek medical advice  Call your healthcare provider right away if any of these occur:  · Increasing redness or swelling  · Red streaks in the skin leading away from the wound  · Increasing local pain or swelling  · Continued pus draining from the wound 2 days after treatment  · Fever of 100.4ºF (38ºC) or higher, or as directed by your healthcare provider  · Boil returns when you are at home  Date Last Reviewed: 9/1/2016  © 3854-3726 [employer]. [address] [place] [address]. All rights reserved. This information is not intended as a substitute for professional medical care. Always follow your healthcare professional's instructions.

## 2018-06-12 ENCOUNTER — TELEPHONE (OUTPATIENT)
Dept: OBSTETRICS AND GYNECOLOGY | Facility: CLINIC | Age: 68
End: 2018-06-12

## 2018-06-12 DIAGNOSIS — Z12.31 VISIT FOR SCREENING MAMMOGRAM: Primary | ICD-10-CM

## 2018-07-14 RX ORDER — ESCITALOPRAM OXALATE 10 MG/1
TABLET ORAL
Qty: 30 TABLET | Refills: 11 | Status: CANCELLED | OUTPATIENT
Start: 2018-07-14

## 2018-07-16 NOTE — TELEPHONE ENCOUNTER
----- Message from Freya Rohan sent at 7/16/2018 10:32 AM CDT -----  Contact: Patient  Type:  RX Refill Request    Who Called:  Geraldine, patient  Refill or New Rx:  Refill  RX Name and Strength:  escitalopram (LEXAPRO) 10 MG tablet  How is the patient currently taking it? (ex. 1XDay):  10 mg every evening  Is this a 30 day or 90 day RX:  30  Preferred Pharmacy with phone number:    [facility]. [address] [address]  Phone: [phone] Fax: [phone]  Local or Mail Order:  Local  Ordering Provider:  Dr Alisson Magaña Call Back Number:  [phone]  Additional Information:  Calling because she needs it filled today, she is leaving to go out of town tomorrow morning. Please advise. Thanks.

## 2018-07-17 RX ORDER — ESCITALOPRAM OXALATE 10 MG/1
10 TABLET ORAL NIGHTLY
Qty: 90 TABLET | Refills: 0 | Status: SHIPPED | OUTPATIENT
Start: 2018-07-17 | End: 2018-10-17 | Stop reason: SDUPTHER

## 2018-07-27 ENCOUNTER — HOSPITAL ENCOUNTER (OUTPATIENT)
Dept: RADIOLOGY | Facility: OTHER | Age: 68
Discharge: HOME | End: 2018-07-27
Attending: OBSTETRICS & GYNECOLOGY
Payer: MEDICARE

## 2018-07-27 ENCOUNTER — OFFICE VISIT (OUTPATIENT)
Dept: OBSTETRICS AND GYNECOLOGY | Facility: CLINIC | Age: 68
End: 2018-07-27
Attending: OBSTETRICS & GYNECOLOGY
Payer: MEDICARE

## 2018-07-27 VITALS
DIASTOLIC BLOOD PRESSURE: 72 MMHG | SYSTOLIC BLOOD PRESSURE: 126 MMHG | BODY MASS INDEX: 27.1 KG/M2 | WEIGHT: 147.25 LBS | HEIGHT: 62 IN

## 2018-07-27 VITALS — HEIGHT: 62 IN | BODY MASS INDEX: 27.05 KG/M2 | WEIGHT: 147 LBS

## 2018-07-27 DIAGNOSIS — Z13.820 OSTEOPOROSIS SCREENING: ICD-10-CM

## 2018-07-27 DIAGNOSIS — M81.8 OTHER OSTEOPOROSIS WITHOUT CURRENT PATHOLOGICAL FRACTURE: ICD-10-CM

## 2018-07-27 DIAGNOSIS — Z12.31 VISIT FOR SCREENING MAMMOGRAM: ICD-10-CM

## 2018-07-27 DIAGNOSIS — Z01.419 ENCOUNTER FOR GYNECOLOGICAL EXAMINATION (GENERAL) (ROUTINE) WITHOUT ABNORMAL FINDINGS: Primary | ICD-10-CM

## 2018-07-27 DIAGNOSIS — M85.9 DISORDER OF BONE DENSITY AND STRUCTURE, UNSPECIFIED: ICD-10-CM

## 2018-07-27 PROCEDURE — 77063 BREAST TOMOSYNTHESIS BI: CPT | Mod: 26,,, | Performed by: RADIOLOGY

## 2018-07-27 PROCEDURE — 99397 PER PM REEVAL EST PAT 65+ YR: CPT | Mod: S$GLB,,, | Performed by: OBSTETRICS & GYNECOLOGY

## 2018-07-27 PROCEDURE — 99999 PR PBB SHADOW E&M-EST. PATIENT-LVL III: CPT | Mod: PBBFAC,,, | Performed by: OBSTETRICS & GYNECOLOGY

## 2018-07-27 PROCEDURE — 77063 BREAST TOMOSYNTHESIS BI: CPT | Mod: TC

## 2018-07-27 PROCEDURE — 77067 SCR MAMMO BI INCL CAD: CPT | Mod: 26,,, | Performed by: RADIOLOGY

## 2018-07-27 RX ORDER — AMOXICILLIN 500 MG/1
CAPSULE ORAL
Refills: 0 | COMMUNITY
Start: 2018-07-11 | End: 2019-01-10

## 2018-07-27 RX ORDER — SULFAMETHOXAZOLE AND TRIMETHOPRIM 800; 160 MG/1; MG/1
1 TABLET ORAL 2 TIMES DAILY
Refills: 0 | COMMUNITY
Start: 2018-05-31 | End: 2019-01-10

## 2018-07-30 NOTE — PROGRESS NOTES
Subjective:       Patient ID: Concha Hess is a 67 y.o. female.    Chief Complaint:  Well Woman (last pap 2017 normal, mammo 2018 normal)      History of Present Illness  67 year old here for well woman appointment.  Patient with no new concerns.  Had mammogram completed today.  Pap reviewed and normal.  Bone density ordered.  Patient was previously seen by urogyn for stress incontinence and reports intermittent estrace use and kegals.  Not currently life changing per patient.  No major changes over the past year.  Had one uti and denies symptoms today.  Denies pelvic pain.  Not currently having vaginal bleeding.  No breast concerns.      GYN & OB History  No LMP recorded. Patient is postmenopausal.   Date of Last Pap: 2017    OB History    Para Term  AB Living   6 6 3     3   SAB TAB Ectopic Multiple Live Births           3      # Outcome Date GA Lbr Garfield/2nd Weight Sex Delivery Anes PTL Lv   6 Para    3.232 kg (7 lb 2 oz) F CS-LTranv   PATIENCE      Complications: Placenta previa   5 Para    3.289 kg (7 lb 4 oz) M Vag-Spont   PATIENCE   4 Para 1973   4.224 kg (9 lb 5 oz) M Vag-Spont   PATIENCE   3 Term            2 Term            1 Term                   Past Medical History:   Diagnosis Date    Acid reflux     Chronic lung disease     COPD (chronic obstructive pulmonary disease)     Depression     Gastric ulcer     Hyperlipidemia     Paralysis of diaphragm     left    PONV (postoperative nausea and vomiting)        Past Surgical History:   Procedure Laterality Date    APPENDECTOMY      BREAST SURGERY      AUGMENTATION     SECTION      COSMETIC SURGERY      FACE LIFT breast reduction    HYSTEROSCOPY  2017       Review of Systems  Review of Systems   Constitutional: Negative for fatigue.   Respiratory: Negative for shortness of breath.    Cardiovascular: Negative for chest pain.   Gastrointestinal: Negative for abdominal pain, constipation, diarrhea and nausea.   Endocrine:  Negative for heat intolerance.   Genitourinary: Negative for difficulty urinating, dysuria, pelvic pain, urgency and vaginal bleeding.        Occasional stress incontinence    Musculoskeletal: Negative for back pain.   Skin: Negative for rash.   Neurological: Negative for headaches.   Hematological: Negative for adenopathy.   Psychiatric/Behavioral: Negative for dysphoric mood. The patient is not nervous/anxious.         Objective:   Physical Exam:   Constitutional: She is oriented to person, place, and time. She appears well-developed and well-nourished.      Neck: No thyromegaly present.    Cardiovascular: Normal rate.    Vulvar atrophy     Pulmonary/Chest: Effort normal and breath sounds normal. Right breast exhibits no mass, no nipple discharge, no skin change, no tenderness and no bleeding. Left breast exhibits no mass, no nipple discharge, no skin change, no tenderness and no bleeding.        Abdominal: Soft. Normal appearance and bowel sounds are normal. She exhibits no distension and no mass. There is no tenderness. There is no rebound and no guarding.     Genitourinary: Vagina normal and uterus normal. Pelvic exam was performed with patient supine. There is no rash, tenderness, lesion or injury on the right labia. There is no rash, tenderness, lesion or injury on the left labia. Uterus is not enlarged, not fixed and not tender. Cervix is normal. Right adnexum displays no mass, no tenderness and no fullness. Left adnexum displays no mass, no tenderness and no fullness. No erythema, tenderness, rectocele, cystocele or unspecified prolapse of vaginal walls in the vagina. No signs of injury around the vagina. No vaginal discharge found. Cervix exhibits no motion tenderness, no discharge and no friability.           Musculoskeletal: Normal range of motion and moves all extremeties.      Lymphadenopathy:     She has no axillary adenopathy.        Right: No supraclavicular adenopathy present.        Left: No  supraclavicular adenopathy present.    Neurological: She is alert and oriented to person, place, and time.    Skin: Skin is warm and dry.    Psychiatric: She has a normal mood and affect. Her behavior is normal. Judgment normal.        Assessment/ Plan:     Encounter for gynecological examination (general) (routine) without abnormal findings    Osteoporosis screening  -     DXA Bone Density Spine And Hip; Future; Expected date: 07/27/2018    Disorder of bone density and structure, unspecified   -     DXA Bone Density Spine And Hip; Future; Expected date: 07/27/2018    Other osteoporosis without current pathological fracture   -     DXA Bone Density Spine And Hip; Future; Expected date: 07/27/2018    discussed estrace use for vulvar atrophy to prevent uti      Follow-up in about 1 year (around 7/27/2019).    Patient was counseled today on A.C.S. Pap guidelines and recommendations for yearly pelvic exams, mammograms and monthly self breast exams; to see her PCP for other health maintenance.

## 2018-09-07 ENCOUNTER — OFFICE VISIT (OUTPATIENT)
Dept: URGENT CARE | Facility: CLINIC | Age: 68
End: 2018-09-07
Payer: MEDICARE

## 2018-09-07 VITALS
BODY MASS INDEX: 27.05 KG/M2 | HEIGHT: 62 IN | DIASTOLIC BLOOD PRESSURE: 82 MMHG | WEIGHT: 147 LBS | SYSTOLIC BLOOD PRESSURE: 122 MMHG | RESPIRATION RATE: 16 BRPM | OXYGEN SATURATION: 95 % | HEART RATE: 84 BPM | TEMPERATURE: 97 F

## 2018-09-07 DIAGNOSIS — N39.0 URINARY TRACT INFECTION WITHOUT HEMATURIA, SITE UNSPECIFIED: Primary | ICD-10-CM

## 2018-09-07 DIAGNOSIS — R30.0 DYSURIA: ICD-10-CM

## 2018-09-07 LAB
BILIRUB UR QL STRIP: POSITIVE
GLUCOSE UR QL STRIP: POSITIVE
KETONES UR QL STRIP: NEGATIVE
LEUKOCYTE ESTERASE UR QL STRIP: POSITIVE
PH, POC UA: 6 (ref 5–8)
POC BLOOD, URINE: POSITIVE
POC NITRATES, URINE: POSITIVE
PROT UR QL STRIP: POSITIVE
SP GR UR STRIP: 1.02 (ref 1–1.03)
UROBILINOGEN UR STRIP-ACNC: POSITIVE (ref 0.1–1.1)

## 2018-09-07 PROCEDURE — 99214 OFFICE O/P EST MOD 30 MIN: CPT | Mod: 25,S$GLB,, | Performed by: EMERGENCY MEDICINE

## 2018-09-07 PROCEDURE — 1101F PT FALLS ASSESS-DOCD LE1/YR: CPT | Mod: CPTII,S$GLB,, | Performed by: EMERGENCY MEDICINE

## 2018-09-07 PROCEDURE — 81003 URINALYSIS AUTO W/O SCOPE: CPT | Mod: QW,S$GLB,, | Performed by: EMERGENCY MEDICINE

## 2018-09-07 RX ORDER — SULFAMETHOXAZOLE AND TRIMETHOPRIM 800; 160 MG/1; MG/1
1 TABLET ORAL 2 TIMES DAILY
Qty: 14 TABLET | Refills: 0 | Status: SHIPPED | OUTPATIENT
Start: 2018-09-07 | End: 2018-09-14

## 2018-09-07 RX ORDER — PHENAZOPYRIDINE HYDROCHLORIDE 100 MG/1
100 TABLET, FILM COATED ORAL 3 TIMES DAILY PRN
Qty: 20 TABLET | Refills: 0 | Status: SHIPPED | OUTPATIENT
Start: 2018-09-07 | End: 2019-01-10

## 2018-09-07 NOTE — PATIENT INSTRUCTIONS
"Hydrate with plenty of fluids  Bactrim ds rx  Phenazopyridine rx  See uti sheet    Follow up with pcp      Bladder Infection, Female (Adult)    Urine is normally doesn't have any bacteria in it. But bacteria can get into the urinary tract from the skin around the rectum. Or they can travel in the blood from elsewhere in the body. Once they are in your urinary tract, they can cause infection in the urethra (urethritis), the bladder (cystitis), or the kidneys (pyelonephritis).  The most common place for an infection is in the bladder. This is called a bladder infection. This is one of the most common infections in women. Most bladder infections are easily treated. They are not serious unless the infection spreads to the kidney.  The phrases "bladder infection," "UTI," and "cystitis" are often used to describe the same thing. But they are not always the same. Cystitis is an inflammation of the bladder. The most common cause of cystitis is an infection.  Symptoms  The infection causes inflammation in the urethra and bladder. This causes many of the symptoms. The most common symptoms of a bladder infection are:  · Pain or burning when urinating  · Having to urinate more often than usual  · Urgent need to urinate  · Only a small amount of urine comes out  · Blood in urine  · Abdominal discomfort. This is usually in the lower abdomen above the pubic bone.  · Cloudy urine  · Strong- or bad-smelling urine  · Unable to urinate (urinary retention)  · Unable to hold urine in (urinary incontinence)  · Fever  · Loss of appetite  · Confusion (in older adults)  Causes  Bladder infections are not contagious. You can't get one from someone else, from a toilet seat, or from sharing a bath.  The most common cause of bladder infections is bacteria from the bowels. The bacteria get onto the skin around the opening of the urethra. From there, they can get into the urine and travel up to the bladder, causing inflammation and infection. " This usually happens because of:  · Wiping improperly after urinating. Always wipe from front to back.  · Bowel incontinence  · Pregnancy  · Procedures such as having a catheter inserted  · Older age  · Not emptying your bladder. This can allow bacteria a chance to grow in your urine.  · Dehydration  · Constipation  · Sex  · Use of a diaphragm for birth control   Treatment  Bladder infections are diagnosed by a urine test. They are treated with antibiotics and usually clear up quickly without complications. Treatment helps prevent a more serious kidney infection.  Medicines  Medicines can help in the treatment of a bladder infection:  · Take antibiotics until they are used up, even if you feel better. It is important to finish them to make sure the infection has cleared.  · You can use acetaminophen or ibuprofen for pain, fever, or discomfort, unless another medicine was prescribed. If you have chronic liver or kidney disease, talk with your healthcare provider before using these medicines. Also talk with your provider if you've ever had a stomach ulcer or gastrointestinal bleeding, or are taking blood-thinner medicines.  · If you are given phenazopydridine to reduce burning with urination, it will cause your urine to become a bright orange color. This can stain clothing.  Care and prevention  These self-care steps can help prevent future infections:  · Drink plenty of fluids to prevent dehydration and flush out your bladder. Do this unless you must restrict fluids for other health reasons, or your doctor told you not to.  · Proper cleaning after going to the bathroom is important. Wipe from front to back after using the toilet to prevent the spread of bacteria.  · Urinate more often. Don't try to hold urine in for a long time.  · Wear loose-fitting clothes and cotton underwear. Avoid tight-fitting pants.  · Improve your diet and prevent constipation. Eat more fresh fruit and vegetables, and fiber, and less junk and  fatty foods.  · Avoid sex until your symptoms are gone.  · Avoid caffeine, alcohol, and spicy foods. These can irritate your bladder.  · Urinate right after intercourse to flush out your bladder.  · If you use birth control pills and have frequent bladder infections, discuss it with your doctor.  Follow-up care  Call your healthcare provider if all symptoms are not gone after 3 days of treatment. This is especially important if you have repeat infections.  If a culture was done, you will be told if your treatment needs to be changed. If directed, you can call to find out the results.  If X-rays were done, you will be told if the results will affect your treatment.  Call 911  Call 911 if any of the following occur:  · Trouble breathing  · Hard to wake up or confusion  · Fainting or loss of consciousness  · Rapid heart rate  When to seek medical advice  Call your healthcare provider right away if any of these occur:  · Fever of 100.4ºF (38.0ºC) or higher, or as directed by your healthcare provider  · Symptoms are not better by the third day of treatment  · Back or belly (abdominal) pain that gets worse  · Repeated vomiting, or unable to keep medicine down  · Weakness or dizziness  · Vaginal discharge  · Pain, redness, or swelling in the outer vaginal area (labia)  Date Last Reviewed: 10/1/2016  © 1645-9145 [employer]. [address] [place] [address], [place] [address]. All rights reserved. This information is not intended as a substitute for professional medical care. Always follow your healthcare professional's instructions.

## 2018-09-07 NOTE — PROGRESS NOTES
"Subjective:       Patient ID: Concha Hess is a 67 y.o. female.    Vitals:    09/07/18 1128   BP: 122/82   Pulse: 84   Resp: 16   Temp: 96.9 °F (36.1 °C)   SpO2: 95%   Weight: 66.7 kg (147 lb)   Height: 5' 2" (1.575 m)       Chief Complaint: Dysuria    Pt states urinary frequency and urgency x 2 days. Pt took AZO. Pt was seen her and treated for cystitis  4/18.NO FEVER, NO BACK PAIN, NO NAUSEA, NO VOMITING, NO BLOOD IN THE URINE      Dysuria    This is a new problem. The current episode started in the past 7 days. The problem occurs every urination. The problem has been gradually worsening. The patient is experiencing no pain. There has been no fever. She is not sexually active. Associated symptoms include frequency and urgency. Pertinent negatives include no chills, hematuria, nausea or vomiting. Treatments tried: AZO. The treatment provided mild relief.     Review of Systems   Constitution: Negative for chills and fever.   Skin: Negative for itching.   Musculoskeletal: Negative for back pain.   Gastrointestinal: Negative for abdominal pain, nausea and vomiting.   Genitourinary: Positive for dysuria, frequency and urgency. Negative for genital sores, hematuria, missed menses and non-menstrual bleeding.       Objective:      Physical Exam   Constitutional: She is oriented to person, place, and time. She appears well-developed and well-nourished.   HENT:   Head: Normocephalic and atraumatic.   Right Ear: External ear normal.   Left Ear: External ear normal.   Nose: No nasal deformity. No epistaxis.   Mouth/Throat: Mucous membranes are normal.   Eyes: Conjunctivae and lids are normal.   Neck: Trachea normal, normal range of motion and phonation normal. Neck supple.   Cardiovascular: Normal rate, regular rhythm, normal heart sounds and normal pulses.   Pulmonary/Chest: Effort normal and breath sounds normal.   Abdominal: Soft. Normal appearance and bowel sounds are normal. She exhibits no distension. There is no " tenderness. There is no CVA tenderness.   NO CVAT   Neurological: She is alert and oriented to person, place, and time.   Skin: Skin is warm, dry and intact.   Psychiatric: She has a normal mood and affect. Her speech is normal and behavior is normal. Cognition and memory are normal.   Nursing note and vitals reviewed.        Office Visit on 09/07/2018   Component Date Value Ref Range Status    POC Blood, Urine 09/07/2018 Positive* Negative Final    10 RBC/uL    POC Bilirubin, Urine 09/07/2018 Positive* Negative Final    3.0 mg/dL    POC Urobilinogen, Urine 09/07/2018 Positive* 0.1 - 1.1 Final    4.0 mg/dL    POC Ketones, Urine 09/07/2018 Negative  Negative Final    POC Protein, Urine 09/07/2018 Positive* Negative Final    100 mg/dL    POC Nitrates, Urine 09/07/2018 Positive* Negative Final    POC Glucose, Urine 09/07/2018 Positive* Negative Final    250 mg/dL    pH, UA 09/07/2018 6.0  5 - 8 Final    POC Specific Gravity, Urine 09/07/2018 1.025  1.003 - 1.029 Final    POC Leukocytes, Urine 09/07/2018 Positive* Negative Final    75 WBC/uL       Assessment:       1. Urinary tract infection without hematuria, site unspecified    2. Dysuria        Plan:       Concha was seen today for dysuria.    Diagnoses and all orders for this visit:    Urinary tract infection without hematuria, site unspecified    Dysuria  -     POCT Urinalysis, Dipstick, Automated, W/O Scope    Other orders  -     phenazopyridine (PYRIDIUM) 100 MG tablet; Take 1 tablet (100 mg total) by mouth 3 (three) times daily as needed for Pain.  -     sulfamethoxazole-trimethoprim 800-160mg (BACTRIM DS) 800-160 mg Tab; Take 1 tablet by mouth 2 (two) times daily. for 7 days          Patient Instructions   Hydrate with plenty of fluids  Bactrim ds rx  Phenazopyridine rx  See uti sheet    Follow up with pcp      Bladder Infection, Female (Adult)    Urine is normally doesn't have any bacteria in it. But bacteria can get into the urinary tract from  "the skin around the rectum. Or they can travel in the blood from elsewhere in the body. Once they are in your urinary tract, they can cause infection in the urethra (urethritis), the bladder (cystitis), or the kidneys (pyelonephritis).  The most common place for an infection is in the bladder. This is called a bladder infection. This is one of the most common infections in women. Most bladder infections are easily treated. They are not serious unless the infection spreads to the kidney.  The phrases "bladder infection," "UTI," and "cystitis" are often used to describe the same thing. But they are not always the same. Cystitis is an inflammation of the bladder. The most common cause of cystitis is an infection.  Symptoms  The infection causes inflammation in the urethra and bladder. This causes many of the symptoms. The most common symptoms of a bladder infection are:  · Pain or burning when urinating  · Having to urinate more often than usual  · Urgent need to urinate  · Only a small amount of urine comes out  · Blood in urine  · Abdominal discomfort. This is usually in the lower abdomen above the pubic bone.  · Cloudy urine  · Strong- or bad-smelling urine  · Unable to urinate (urinary retention)  · Unable to hold urine in (urinary incontinence)  · Fever  · Loss of appetite  · Confusion (in older adults)  Causes  Bladder infections are not contagious. You can't get one from someone else, from a toilet seat, or from sharing a bath.  The most common cause of bladder infections is bacteria from the bowels. The bacteria get onto the skin around the opening of the urethra. From there, they can get into the urine and travel up to the bladder, causing inflammation and infection. This usually happens because of:  · Wiping improperly after urinating. Always wipe from front to back.  · Bowel incontinence  · Pregnancy  · Procedures such as having a catheter inserted  · Older age  · Not emptying your bladder. This can allow " bacteria a chance to grow in your urine.  · Dehydration  · Constipation  · Sex  · Use of a diaphragm for birth control   Treatment  Bladder infections are diagnosed by a urine test. They are treated with antibiotics and usually clear up quickly without complications. Treatment helps prevent a more serious kidney infection.  Medicines  Medicines can help in the treatment of a bladder infection:  · Take antibiotics until they are used up, even if you feel better. It is important to finish them to make sure the infection has cleared.  · You can use acetaminophen or ibuprofen for pain, fever, or discomfort, unless another medicine was prescribed. If you have chronic liver or kidney disease, talk with your healthcare provider before using these medicines. Also talk with your provider if you've ever had a stomach ulcer or gastrointestinal bleeding, or are taking blood-thinner medicines.  · If you are given phenazopydridine to reduce burning with urination, it will cause your urine to become a bright orange color. This can stain clothing.  Care and prevention  These self-care steps can help prevent future infections:  · Drink plenty of fluids to prevent dehydration and flush out your bladder. Do this unless you must restrict fluids for other health reasons, or your doctor told you not to.  · Proper cleaning after going to the bathroom is important. Wipe from front to back after using the toilet to prevent the spread of bacteria.  · Urinate more often. Don't try to hold urine in for a long time.  · Wear loose-fitting clothes and cotton underwear. Avoid tight-fitting pants.  · Improve your diet and prevent constipation. Eat more fresh fruit and vegetables, and fiber, and less junk and fatty foods.  · Avoid sex until your symptoms are gone.  · Avoid caffeine, alcohol, and spicy foods. These can irritate your bladder.  · Urinate right after intercourse to flush out your bladder.  · If you use birth control pills and have  frequent bladder infections, discuss it with your doctor.  Follow-up care  Call your healthcare provider if all symptoms are not gone after 3 days of treatment. This is especially important if you have repeat infections.  If a culture was done, you will be told if your treatment needs to be changed. If directed, you can call to find out the results.  If X-rays were done, you will be told if the results will affect your treatment.  Call 911  Call 911 if any of the following occur:  · Trouble breathing  · Hard to wake up or confusion  · Fainting or loss of consciousness  · Rapid heart rate  When to seek medical advice  Call your healthcare provider right away if any of these occur:  · Fever of 100.4ºF (38.0ºC) or higher, or as directed by your healthcare provider  · Symptoms are not better by the third day of treatment  · Back or belly (abdominal) pain that gets worse  · Repeated vomiting, or unable to keep medicine down  · Weakness or dizziness  · Vaginal discharge  · Pain, redness, or swelling in the outer vaginal area (labia)  Date Last Reviewed: 10/1/2016  © 8221-7667 [employer]. [address] [place] [id number]. All rights reserved. This information is not intended as a substitute for professional medical care. Always follow your healthcare professional's instructions.

## 2018-09-12 ENCOUNTER — OFFICE VISIT (OUTPATIENT)
Dept: PULMONOLOGY | Facility: CLINIC | Age: 68
End: 2018-09-12
Payer: MEDICARE

## 2018-09-12 ENCOUNTER — HOSPITAL ENCOUNTER (OUTPATIENT)
Dept: RADIOLOGY | Facility: HOSPITAL | Age: 68
Discharge: HOME | End: 2018-09-12
Attending: INTERNAL MEDICINE
Payer: MEDICARE

## 2018-09-12 VITALS
DIASTOLIC BLOOD PRESSURE: 74 MMHG | SYSTOLIC BLOOD PRESSURE: 118 MMHG | BODY MASS INDEX: 27.34 KG/M2 | HEART RATE: 82 BPM | WEIGHT: 148.56 LBS | OXYGEN SATURATION: 97 % | HEIGHT: 62 IN

## 2018-09-12 DIAGNOSIS — R91.8 ABNORMAL CT SCAN, LUNG: Primary | ICD-10-CM

## 2018-09-12 DIAGNOSIS — R91.1 LUNG NODULE: ICD-10-CM

## 2018-09-12 PROCEDURE — 99214 OFFICE O/P EST MOD 30 MIN: CPT | Mod: S$PBB,,, | Performed by: INTERNAL MEDICINE

## 2018-09-12 PROCEDURE — 99213 OFFICE O/P EST LOW 20 MIN: CPT | Mod: PBBFAC,25 | Performed by: INTERNAL MEDICINE

## 2018-09-12 PROCEDURE — 99999 PR PBB SHADOW E&M-EST. PATIENT-LVL III: CPT | Mod: PBBFAC,,, | Performed by: INTERNAL MEDICINE

## 2018-09-12 PROCEDURE — 71250 CT THORAX DX C-: CPT | Mod: TC

## 2018-09-12 PROCEDURE — 1101F PT FALLS ASSESS-DOCD LE1/YR: CPT | Mod: CPTII,,, | Performed by: INTERNAL MEDICINE

## 2018-09-12 PROCEDURE — 71250 CT THORAX DX C-: CPT | Mod: 26,,, | Performed by: RADIOLOGY

## 2018-09-12 NOTE — LETTER
September 13, 2018    Concha Hess  [address] [facility] [address]             [facility]  [address]  [place] [address]  Phone: [phone] Dear Mrs. Hess:    I am enclosing a copy of my note of today's visit and the CT report for your file. All is well.     If you have any questions or concerns, please don't hesitate to call.    Sincerely,        Rubens Snider MD

## 2018-09-13 NOTE — PROGRESS NOTES
Subjective:       Patient ID: Concha Hess is a 67 y.o. female.    Chief Complaint: Abnormal Ct Scan    HPI 68 yo female comes for follow up CT to monitor a small 4 mm nodule noted on CT done in [place] last year. The CT today will arden 24 months of surveillance. Ex- smoker--29 years no loner active smoker. Has an elevated left chio diaphragm.Today's CT shows that the 3-4 mm lesion is unchangd. No further studies needed. Resume exercise program.    No flowsheet data found.]  Review of Systems   Constitutional: Negative.    HENT: Negative.    Eyes: Negative.    Respiratory: Negative.         Abnormal CT   Cardiovascular: Negative.    Genitourinary: Negative.    Musculoskeletal: Negative.    Skin: Negative.    Gastrointestinal: Negative.         GERD's   Neurological: Negative.    Psychiatric/Behavioral: Negative.        Objective:      Physical Exam   Constitutional: She is oriented to person, place, and time. She appears well-developed and well-nourished. No distress.   HENT:   Head: Normocephalic and atraumatic.   Right Ear: External ear normal.   Left Ear: External ear normal.   Nose: Nose normal.   Mouth/Throat: Oropharynx is clear and moist.   Eyes: Conjunctivae and EOM are normal. Pupils are equal, round, and reactive to light.   Neck: Normal range of motion. Neck supple. No JVD present. No thyromegaly present.   Cardiovascular: Normal rate, regular rhythm, normal heart sounds and intact distal pulses. Exam reveals no gallop.   No murmur heard.  Pulmonary/Chest: Breath sounds normal. No stridor. No respiratory distress. She has no wheezes. She has no rales. She exhibits no tenderness.   Clear   Sa02: 97%    Peak flow 350 l/min   Abdominal: Soft. Bowel sounds are normal. She exhibits no distension and no mass. There is no tenderness. There is no rebound and no guarding.   Musculoskeletal: Normal range of motion. She exhibits no edema.   Lymphadenopathy:     She has no cervical adenopathy.   Neurological:  She is alert and oriented to person, place, and time. She has normal reflexes. She displays normal reflexes. No cranial nerve deficit.   Skin: Skin is warm and dry. No rash noted.   Psychiatric: She has a normal mood and affect. Her behavior is normal. Judgment and thought content normal.   Nursing note and vitals reviewed.          Assessment:       No diagnosis found.    Outpatient Encounter Medications as of 9/12/2018   Medication Sig Dispense Refill    amoxicillin (AMOXIL) 500 MG capsule take 1 capsule by mouth three times a day until finished  0    aspirin (ECOTRIN) 81 MG EC tablet Take 81 mg by mouth once daily.      BREO ELLIPTA 100-25 mcg/dose diskus inhaler 1 puff once daily.   0    cetirizine 10 mg chewable tablet Take 10 mg by mouth once daily.      clorazepate (TRANXENE) 7.5 MG Tab take 1 tablet by mouth twice a day 60 tablet 3    diclofenac (CATAFLAM) 50 MG tablet take 1 tablet by mouth twice a day 60 tablet 3    ergocalciferol (ERGOCALCIFEROL) 50,000 unit Cap Take 1 capsule (50,000 Units total) by mouth every 7 days. 12 capsule 1    escitalopram oxalate (LEXAPRO) 10 MG tablet Take 1 tablet (10 mg total) by mouth every evening. 90 tablet 0    esomeprazole (NEXIUM) 20 MG capsule Take 20 mg by mouth before breakfast.      estradiol (ESTRACE) 0.01 % (0.1 mg/gram) vaginal cream Use 0.5 gram of estrogen cream in vagina nightly x 2 weeks, then twice a week thereafter 42.5 g 3    fluticasone (FLONASE) 50 mcg/actuation nasal spray instill 2 sprays into each nostril once daily 16 g 11    ibuprofen (ADVIL,MOTRIN) 600 MG tablet Take 1 tablet (600 mg total) by mouth every 6 (six) hours as needed for Pain. 20 tablet 0    MULTIVITS-MIN/IRON/FA/LUTEIN (ULTIMATE WOMEN'S COMPLETE 50+ ORAL) Take by mouth.      phenazopyridine (PYRIDIUM) 100 MG tablet Take 1 tablet (100 mg total) by mouth 3 (three) times daily as needed for Pain. 20 tablet 0    rosuvastatin (CRESTOR) 5 MG tablet Take 1 tablet (5 mg total)  by mouth once daily. 90 tablet 1    sulfamethoxazole-trimethoprim 800-160mg (BACTRIM DS) 800-160 mg Tab Take 1 tablet by mouth 2 (two) times daily.  0    sulfamethoxazole-trimethoprim 800-160mg (BACTRIM DS) 800-160 mg Tab Take 1 tablet by mouth 2 (two) times daily. for 7 days 14 tablet 0     No facility-administered encounter medications on file as of 9/12/2018.      No orders of the defined types were placed in this encounter.    Plan:            E mm nodule in the periphery of the right middle lobe is stable and unchanged over 24 months observation. No further monitoring required.

## 2018-10-01 RX ORDER — CLORAZEPATE DIPOTASSIUM 7.5 MG/1
TABLET ORAL
Qty: 60 TABLET | Refills: 3 | Status: SHIPPED | OUTPATIENT
Start: 2018-10-01 | End: 2019-04-22 | Stop reason: SDUPTHER

## 2018-10-17 RX ORDER — ESCITALOPRAM OXALATE 10 MG/1
10 TABLET ORAL DAILY
Qty: 90 TABLET | Refills: 3 | Status: SHIPPED | OUTPATIENT
Start: 2018-10-17 | End: 2019-09-30 | Stop reason: SDUPTHER

## 2018-12-04 ENCOUNTER — OFFICE VISIT (OUTPATIENT)
Dept: URGENT CARE | Facility: CLINIC | Age: 68
End: 2018-12-04
Payer: MEDICARE

## 2018-12-04 VITALS
HEIGHT: 62 IN | BODY MASS INDEX: 27.23 KG/M2 | WEIGHT: 148 LBS | TEMPERATURE: 98 F | OXYGEN SATURATION: 99 % | HEART RATE: 89 BPM | SYSTOLIC BLOOD PRESSURE: 109 MMHG | RESPIRATION RATE: 16 BRPM | DIASTOLIC BLOOD PRESSURE: 72 MMHG

## 2018-12-04 DIAGNOSIS — J40 SINOBRONCHITIS: Primary | ICD-10-CM

## 2018-12-04 DIAGNOSIS — J32.9 SINOBRONCHITIS: Primary | ICD-10-CM

## 2018-12-04 PROCEDURE — 99214 OFFICE O/P EST MOD 30 MIN: CPT | Mod: S$GLB,,, | Performed by: NURSE PRACTITIONER

## 2018-12-04 PROCEDURE — 1101F PT FALLS ASSESS-DOCD LE1/YR: CPT | Mod: CPTII,S$GLB,, | Performed by: NURSE PRACTITIONER

## 2018-12-04 RX ORDER — BENZONATATE 200 MG/1
200 CAPSULE ORAL 3 TIMES DAILY PRN
Qty: 30 CAPSULE | Refills: 0 | Status: SHIPPED | OUTPATIENT
Start: 2018-12-04 | End: 2018-12-14

## 2018-12-04 RX ORDER — FLUTICASONE PROPIONATE 50 MCG
2 SPRAY, SUSPENSION (ML) NASAL DAILY
Qty: 1 BOTTLE | Refills: 0 | Status: SHIPPED | OUTPATIENT
Start: 2018-12-04 | End: 2021-09-10

## 2018-12-04 RX ORDER — IBUPROFEN 800 MG/1
TABLET ORAL
COMMUNITY
Start: 2018-10-06 | End: 2019-01-10

## 2018-12-04 RX ORDER — AZITHROMYCIN 250 MG/1
TABLET, FILM COATED ORAL
Qty: 6 TABLET | Refills: 0 | Status: SHIPPED | OUTPATIENT
Start: 2018-12-04 | End: 2019-01-10

## 2018-12-04 RX ORDER — PROMETHAZINE HYDROCHLORIDE AND CODEINE PHOSPHATE 6.25; 1 MG/5ML; MG/5ML
SOLUTION ORAL
Qty: 120 ML | Refills: 0 | Status: SHIPPED | OUTPATIENT
Start: 2018-12-04 | End: 2019-01-10

## 2018-12-04 NOTE — PATIENT INSTRUCTIONS
Please drink plenty of fluids.  Please get plenty of rest.  Please return here or go to the Emergency Department for any concerns or worsening of condition.  If you were prescribed antibiotics, please take them to completion.  If you were prescribed a narcotic medication, do not drive or operate heavy equipment or machinery while taking these medications.  If you do not have Hypertension or any history of palpitations, it is ok to take over the counter Sudafed or Mucinex D or Allegra-D or Claritin-D or Zyrtec-D.  If you do take one of the above, it is ok to combine that with plain over the counter Mucinex or Allegra or Claritin or Zyrtec.  If for example you are taking Zyrtec -D, you can combine that with Mucinex, but not Mucinex-D.  If you are taking Mucinex-D, you can combine that with plain Allegra or Claritin or Zyrtec.   If you do have Hypertension or palpitations, it is safe to take Coricidin HBP for relief of sinus symptoms.  We recommend you take over the counter Flonase (Fluticasone) or another nasally inhaled steroid unless you are already taking one.  Nasal irrigation with a saline spray or Netti Pot like device per their directions is also recommended.  If not allergic, please take over the counter Tylenol (Acetaminophen) and/or Motrin (Ibuprofen) as directed for control of pain and/or fever.  Please follow up with your primary care doctor or specialist as needed.    If you  smoke, please stop smoking.  Self-Care for Sinusitis     Drinking plenty of water can help sinuses drain.   Sinusitis can often be managed with self-care. Self-care can keep sinuses moist and make you feel more comfortable. Remember to follow your doctor's instructions closely. This can make a big difference in getting your sinus problem under control.  Drink fluids  Drinking extra fluids helps thin your mucus. This lets it drain from your sinuses more easily. Have a glass of water every hour or two. A humidifier helps in much the  same way. Fluids can also offset the drying effects of certain medicines. If you use a humidifier, follow the product maker's instructions on how to use it. Clean it on a regular schedule.  Use saltwater rinses  Rinses help keep your sinuses and nose moist. Mix a teaspoon of salt in 8 ounces of fresh, warm water. Use a bulb syringe to gently squirt the water into your nose a few times a day. You can also buy ready-made saline nasal sprays.  Apply hot or cold packs  Applying heat to the area surrounding your sinuses may make you feel more comfortable. Use a hot water bottle or a hand towel dipped in hot water. Some people also find ice packs effective for relieving pain.  Medicines  Your doctor may prescribe medications to help treat your sinusitis. If you have an infection, antibiotics can help clear it up. If you are prescribed antibiotics, take all pills on schedule until they are gone, even if you feel better. Decongestants help relieve swelling. Use decongestant sprays for short periods only under the direction of your doctor. If you have allergies, your doctor may prescribe medications to help relieve them.   Date Last Reviewed: 10/1/2016  © 3856-3195 [employer]. [address] [place] [address], [place] [address]. All rights reserved. This information is not intended as a substitute for professional medical care. Always follow your healthcare professional's instructions.        Bronchitis, Antibiotic Treatment (Adult)    Bronchitis is an infection of the air passages (bronchial tubes) in your lungs. It often occurs when you have a cold. This illness is contagious during the first few days and is spread through the air by coughing and sneezing, or by direct contact (touching the sick person and then touching your own eyes, nose, or mouth).  Symptoms of bronchitis include cough with mucus (phlegm) and low-grade fever. Bronchitis usually lasts 7 to 14 days. Mild cases can be treated with simple home  remedies. More severe infection is treated with an antibiotic.  Home care  Follow these guidelines when caring for yourself at home:  · If your symptoms are severe, rest at home for the first 2 to 3 days. When you go back to your usual activities, don't let yourself get too tired.  · Do not smoke. Also avoid being exposed to secondhand smoke.  · You may use over-the-counter medicines to control fever or pain, unless another medicine was prescribed. (Note: If you have chronic liver or kidney disease or have ever had a stomach ulcer or gastrointestinal bleeding, talk with your healthcare provider before using these medicines. Also talk to your provider if you are taking medicine to prevent blood clots.) Aspirin should never be given to anyone younger than 18 years of age who is ill with a viral infection or fever. It may cause severe liver or brain damage.  · Your appetite may be poor, so a light diet is fine. Avoid dehydration by drinking 6 to 8 glasses of fluids per day (such as water, soft drinks, sports drinks, juices, tea, or soup). Extra fluids will help loosen secretions in the nose and lungs.  · Over-the-counter cough, cold, and sore-throat medicines will not shorten the length of the illness, but they may be helpful to reduce symptoms. (Note: Do not use decongestants if you have high blood pressure.)  · Finish all antibiotic medicine. Do this even if you are feeling better after only a few days.  Follow-up care  Follow up with your healthcare provider, or as advised. If you had an X-ray or ECG (electrocardiogram), a specialist will review it. You will be notified of any new findings that may affect your care.  Note: If you are age 65 or older, or if you have a chronic lung disease or condition that affects your immune system, or you smoke, talk to your healthcare provider about having pneumococcal vaccinations and a yearly influenza vaccination (flu shot).  When to seek medical advice  Call your healthcare  provider right away if any of these occur:  · Fever of 100.4°F (38°C) or higher  · Coughing up increased amounts of colored sputum  · Weakness, drowsiness, headache, facial pain, ear pain, or a stiff neck  Call 911, or get immediate medical care  Contact emergency services right away if any of these occur.  · Coughing up blood  · Worsening weakness, drowsiness, headache, or stiff neck  · Trouble breathing, wheezing, or pain with breathing  Date Last Reviewed: 9/13/2015 © 2000-2017 [employer]. [address] [place] [address]. All rights reserved. This information is not intended as a substitute for professional medical care. Always follow your healthcare professional's instructions.

## 2018-12-04 NOTE — PROGRESS NOTES
"Subjective:       Patient ID: Concha Hess is a 68 y.o. female.    Vitals:    12/04/18 1114   BP: 109/72   Pulse: 89   Resp: 16   Temp: 97.7 °F (36.5 °C)   TempSrc: Oral   SpO2: 99%   Weight: 67.1 kg (148 lb)   Height: 5' 2" (1.575 m)       Chief Complaint: URI    Pt has had chest and nasal congestion for 3 days. Pt states she is fatigued and hoarse with green sputum. Pt states she is prone to bronchitis. Pt states she's tried mucinex and zycam.  Pt states her stool was very soft yesterday.  Pt is having trouble sleeping.      URI    This is a new problem. The current episode started in the past 7 days. The problem has been unchanged. There has been no fever. The fever has been present for less than 1 day. Associated symptoms include congestion, coughing and sinus pain. Pertinent negatives include no abdominal pain, chest pain, ear pain, headaches, nausea, plugged ear sensation, sore throat or wheezing. She has tried decongestant, antihistamine and inhaler use for the symptoms. The treatment provided mild relief.     Review of Systems   Constitution: Negative for chills, fever and malaise/fatigue.   HENT: Positive for congestion, hoarse voice and sinus pain. Negative for ear pain and sore throat.    Eyes: Negative for discharge and redness.   Cardiovascular: Negative for chest pain, dyspnea on exertion and leg swelling.   Respiratory: Positive for cough and sputum production. Negative for shortness of breath and wheezing.    Musculoskeletal: Negative for myalgias.   Gastrointestinal: Negative for abdominal pain and nausea.   Neurological: Negative for headaches.       Objective:      Physical Exam   Constitutional: She is oriented to person, place, and time. She appears well-developed and well-nourished. She is cooperative.  Non-toxic appearance. She does not appear ill. No distress.   HENT:   Head: Normocephalic and atraumatic.   Right Ear: Hearing, tympanic membrane, external ear and ear canal normal. "   Left Ear: Hearing, tympanic membrane, external ear and ear canal normal.   Nose: Mucosal edema and rhinorrhea present. No nasal deformity. No epistaxis. Right sinus exhibits no maxillary sinus tenderness and no frontal sinus tenderness. Left sinus exhibits no maxillary sinus tenderness and no frontal sinus tenderness.   Mouth/Throat: Uvula is midline and mucous membranes are normal. No trismus in the jaw. Normal dentition. No uvula swelling. Posterior oropharyngeal erythema (postnasal drip, hoarse voice) present. No oropharyngeal exudate or posterior oropharyngeal edema.   Eyes: Conjunctivae and lids are normal. No scleral icterus.   Sclera clear bilat   Neck: Trachea normal, full passive range of motion without pain and phonation normal. Neck supple.   Cardiovascular: Normal rate, regular rhythm, normal heart sounds, intact distal pulses and normal pulses.   Pulmonary/Chest: Effort normal and breath sounds normal. No respiratory distress. She has no wheezes.   Green productive cough during exam   Abdominal: Soft. Normal appearance and bowel sounds are normal. She exhibits no distension. There is no tenderness.   Musculoskeletal: Normal range of motion. She exhibits no edema or deformity.   Lymphadenopathy:     She has no cervical adenopathy.   Neurological: She is alert and oriented to person, place, and time. She exhibits normal muscle tone. Coordination normal.   Skin: Skin is warm, dry and intact. She is not diaphoretic. No pallor.   Psychiatric: She has a normal mood and affect. Her speech is normal and behavior is normal. Judgment and thought content normal. Cognition and memory are normal.   Nursing note and vitals reviewed.      Assessment:       1. Sinobronchitis        Plan:       Concha was seen today for uri.    Diagnoses and all orders for this visit:    Sinobronchitis  -     fluticasone (FLONASE) 50 mcg/actuation nasal spray; 2 sprays (100 mcg total) by Each Nare route once daily.  -      azithromycin (ZITHROMAX Z-SANDY) 250 MG tablet; Take 2 po now  then 1 daily x 4 days  -     benzonatate (TESSALON) 200 MG capsule; Take 1 capsule (200 mg total) by mouth 3 (three) times daily as needed for Cough.  -     promethazine-codeine 6.25-10 mg/5 ml (PHENERGAN WITH CODEINE) 6.25-10 mg/5 mL syrup; 5-10ml nightly as needed for cough      Patient Instructions     Please drink plenty of fluids.  Please get plenty of rest.  Please return here or go to the Emergency Department for any concerns or worsening of condition.  If you were prescribed antibiotics, please take them to completion.  If you were prescribed a narcotic medication, do not drive or operate heavy equipment or machinery while taking these medications.  If you do not have Hypertension or any history of palpitations, it is ok to take over the counter Sudafed or Mucinex D or Allegra-D or Claritin-D or Zyrtec-D.  If you do take one of the above, it is ok to combine that with plain over the counter Mucinex or Allegra or Claritin or Zyrtec.  If for example you are taking Zyrtec -D, you can combine that with Mucinex, but not Mucinex-D.  If you are taking Mucinex-D, you can combine that with plain Allegra or Claritin or Zyrtec.   If you do have Hypertension or palpitations, it is safe to take Coricidin HBP for relief of sinus symptoms.  We recommend you take over the counter Flonase (Fluticasone) or another nasally inhaled steroid unless you are already taking one.  Nasal irrigation with a saline spray or Netti Pot like device per their directions is also recommended.  If not allergic, please take over the counter Tylenol (Acetaminophen) and/or Motrin (Ibuprofen) as directed for control of pain and/or fever.  Please follow up with your primary care doctor or specialist as needed.    If you  smoke, please stop smoking.  Self-Care for Sinusitis     Drinking plenty of water can help sinuses drain.   Sinusitis can often be managed with self-care. Self-care can  keep sinuses moist and make you feel more comfortable. Remember to follow your doctor's instructions closely. This can make a big difference in getting your sinus problem under control.  Drink fluids  Drinking extra fluids helps thin your mucus. This lets it drain from your sinuses more easily. Have a glass of water every hour or two. A humidifier helps in much the same way. Fluids can also offset the drying effects of certain medicines. If you use a humidifier, follow the product maker's instructions on how to use it. Clean it on a regular schedule.  Use saltwater rinses  Rinses help keep your sinuses and nose moist. Mix a teaspoon of salt in 8 ounces of fresh, warm water. Use a bulb syringe to gently squirt the water into your nose a few times a day. You can also buy ready-made saline nasal sprays.  Apply hot or cold packs  Applying heat to the area surrounding your sinuses may make you feel more comfortable. Use a hot water bottle or a hand towel dipped in hot water. Some people also find ice packs effective for relieving pain.  Medicines  Your doctor may prescribe medications to help treat your sinusitis. If you have an infection, antibiotics can help clear it up. If you are prescribed antibiotics, take all pills on schedule until they are gone, even if you feel better. Decongestants help relieve swelling. Use decongestant sprays for short periods only under the direction of your doctor. If you have allergies, your doctor may prescribe medications to help relieve them.   Date Last Reviewed: 10/1/2016  © 0699-3118 [employer]. [address] [place] [address], [place] [address]. All rights reserved. This information is not intended as a substitute for professional medical care. Always follow your healthcare professional's instructions.        Bronchitis, Antibiotic Treatment (Adult)    Bronchitis is an infection of the air passages (bronchial tubes) in your lungs. It often occurs when you have a cold. This  illness is contagious during the first few days and is spread through the air by coughing and sneezing, or by direct contact (touching the sick person and then touching your own eyes, nose, or mouth).  Symptoms of bronchitis include cough with mucus (phlegm) and low-grade fever. Bronchitis usually lasts 7 to 14 days. Mild cases can be treated with simple home remedies. More severe infection is treated with an antibiotic.  Home care  Follow these guidelines when caring for yourself at home:  · If your symptoms are severe, rest at home for the first 2 to 3 days. When you go back to your usual activities, don't let yourself get too tired.  · Do not smoke. Also avoid being exposed to secondhand smoke.  · You may use over-the-counter medicines to control fever or pain, unless another medicine was prescribed. (Note: If you have chronic liver or kidney disease or have ever had a stomach ulcer or gastrointestinal bleeding, talk with your healthcare provider before using these medicines. Also talk to your provider if you are taking medicine to prevent blood clots.) Aspirin should never be given to anyone younger than 18 years of age who is ill with a viral infection or fever. It may cause severe liver or brain damage.  · Your appetite may be poor, so a light diet is fine. Avoid dehydration by drinking 6 to 8 glasses of fluids per day (such as water, soft drinks, sports drinks, juices, tea, or soup). Extra fluids will help loosen secretions in the nose and lungs.  · Over-the-counter cough, cold, and sore-throat medicines will not shorten the length of the illness, but they may be helpful to reduce symptoms. (Note: Do not use decongestants if you have high blood pressure.)  · Finish all antibiotic medicine. Do this even if you are feeling better after only a few days.  Follow-up care  Follow up with your healthcare provider, or as advised. If you had an X-ray or ECG (electrocardiogram), a specialist will review it. You will be  notified of any new findings that may affect your care.  Note: If you are age 65 or older, or if you have a chronic lung disease or condition that affects your immune system, or you smoke, talk to your healthcare provider about having pneumococcal vaccinations and a yearly influenza vaccination (flu shot).  When to seek medical advice  Call your healthcare provider right away if any of these occur:  · Fever of 100.4°F (38°C) or higher  · Coughing up increased amounts of colored sputum  · Weakness, drowsiness, headache, facial pain, ear pain, or a stiff neck  Call 911, or get immediate medical care  Contact emergency services right away if any of these occur.  · Coughing up blood  · Worsening weakness, drowsiness, headache, or stiff neck  · Trouble breathing, wheezing, or pain with breathing  Date Last Reviewed: 9/13/2015  © 4270-1521 [employer]. [address] [place] [address]. All rights reserved. This information is not intended as a substitute for professional medical care. Always follow your healthcare professional's instructions.

## 2018-12-30 ENCOUNTER — IMMUNIZATION (OUTPATIENT)
Dept: URGENT CARE | Facility: CLINIC | Age: 68
End: 2018-12-30

## 2018-12-30 ENCOUNTER — OFFICE VISIT (OUTPATIENT)
Dept: URGENT CARE | Facility: CLINIC | Age: 68
End: 2018-12-30
Payer: MEDICARE

## 2018-12-30 VITALS
TEMPERATURE: 98 F | OXYGEN SATURATION: 97 % | RESPIRATION RATE: 16 BRPM | HEIGHT: 62 IN | SYSTOLIC BLOOD PRESSURE: 120 MMHG | HEART RATE: 92 BPM | BODY MASS INDEX: 27.23 KG/M2 | DIASTOLIC BLOOD PRESSURE: 74 MMHG | WEIGHT: 148 LBS

## 2018-12-30 DIAGNOSIS — R05.8 PRODUCTIVE COUGH: ICD-10-CM

## 2018-12-30 DIAGNOSIS — J20.9 ACUTE PURULENT BRONCHITIS: Primary | ICD-10-CM

## 2018-12-30 LAB
CTP QC/QA: YES
FLUAV AG NPH QL: NEGATIVE
FLUBV AG NPH QL: NEGATIVE

## 2018-12-30 PROCEDURE — 71046 X-RAY EXAM CHEST 2 VIEWS: CPT | Mod: S$GLB,,, | Performed by: RADIOLOGY

## 2018-12-30 PROCEDURE — 96372 THER/PROPH/DIAG INJ SC/IM: CPT | Mod: S$GLB,,, | Performed by: NURSE PRACTITIONER

## 2018-12-30 PROCEDURE — 87804 INFLUENZA ASSAY W/OPTIC: CPT | Mod: QW,S$GLB,, | Performed by: NURSE PRACTITIONER

## 2018-12-30 RX ORDER — DOXYCYCLINE 100 MG/1
100 CAPSULE ORAL 2 TIMES DAILY
Qty: 20 CAPSULE | Refills: 0 | Status: SHIPPED | OUTPATIENT
Start: 2018-12-30 | End: 2019-01-10

## 2018-12-30 RX ORDER — PROMETHAZINE HYDROCHLORIDE AND CODEINE PHOSPHATE 6.25; 1 MG/5ML; MG/5ML
SOLUTION ORAL
Qty: 180 ML | Refills: 0 | Status: SHIPPED | OUTPATIENT
Start: 2018-12-30 | End: 2019-01-10

## 2018-12-30 RX ORDER — BETAMETHASONE SODIUM PHOSPHATE AND BETAMETHASONE ACETATE 3; 3 MG/ML; MG/ML
6 INJECTION, SUSPENSION INTRA-ARTICULAR; INTRALESIONAL; INTRAMUSCULAR; SOFT TISSUE
Status: COMPLETED | OUTPATIENT
Start: 2018-12-30 | End: 2018-12-30

## 2018-12-30 RX ORDER — CYANOCOBALAMIN 1000 UG/ML
1000 INJECTION, SOLUTION INTRAMUSCULAR; SUBCUTANEOUS
Status: COMPLETED | OUTPATIENT
Start: 2018-12-30 | End: 2018-12-30

## 2018-12-30 RX ORDER — BENZONATATE 200 MG/1
200 CAPSULE ORAL 3 TIMES DAILY PRN
Qty: 30 CAPSULE | Refills: 1 | Status: SHIPPED | OUTPATIENT
Start: 2018-12-30 | End: 2019-01-10

## 2018-12-30 RX ADMIN — CYANOCOBALAMIN 1000 MCG: 1000 INJECTION, SOLUTION INTRAMUSCULAR; SUBCUTANEOUS at 10:12

## 2018-12-30 RX ADMIN — BETAMETHASONE SODIUM PHOSPHATE AND BETAMETHASONE ACETATE 6 MG: 3; 3 INJECTION, SUSPENSION INTRA-ARTICULAR; INTRALESIONAL; INTRAMUSCULAR; SOFT TISSUE at 10:12

## 2018-12-30 NOTE — PATIENT INSTRUCTIONS
Please drink plenty of fluids.  Please get plenty of rest.  Please return here or go to the Emergency Department for any concerns or worsening of condition.  If you were prescribed antibiotics, please take them to completion.  If you were prescribed a narcotic medication, do not drive or operate heavy equipment or machinery while taking these medications.  If you were given a steroid shot in the clinic and have also been given a prescription for a steroid such as Prednisone or a Medrol Dose Pack, please begin taking them tomorrow.  If you do not have Hypertension or any history of palpitations, it is ok to take over the counter Sudafed or Mucinex D or Allegra-D or Claritin-D or Zyrtec-D.  If you do take one of the above, it is ok to combine that with plain over the counter Mucinex or Allegra or Claritin or Zyrtec.  If for example you are taking Zyrtec -D, you can combine that with Mucinex, but not Mucinex-D.  If you are taking Mucinex-D, you can combine that with plain Allegra or Claritin or Zyrtec.   If you do have Hypertension or palpitations, it is safe to take Coricidin HBP for relief of sinus symptoms.  If not allergic, please take over the counter Tylenol (Acetaminophen) and/or Motrin (Ibuprofen) as directed for control of pain and/or fever.  Please follow up with your primary care doctor or specialist as needed.    If you  smoke, please stop smoking.  Bronchitis, Antibiotic Treatment (Adult)    Bronchitis is an infection of the air passages (bronchial tubes) in your lungs. It often occurs when you have a cold. This illness is contagious during the first few days and is spread through the air by coughing and sneezing, or by direct contact (touching the sick person and then touching your own eyes, nose, or mouth).  Symptoms of bronchitis include cough with mucus (phlegm) and low-grade fever. Bronchitis usually lasts 7 to 14 days. Mild cases can be treated with simple home remedies. More severe infection is  treated with an antibiotic.  Home care  Follow these guidelines when caring for yourself at home:  · If your symptoms are severe, rest at home for the first 2 to 3 days. When you go back to your usual activities, don't let yourself get too tired.  · Do not smoke. Also avoid being exposed to secondhand smoke.  · You may use over-the-counter medicines to control fever or pain, unless another medicine was prescribed. (Note: If you have chronic liver or kidney disease or have ever had a stomach ulcer or gastrointestinal bleeding, talk with your healthcare provider before using these medicines. Also talk to your provider if you are taking medicine to prevent blood clots.) Aspirin should never be given to anyone younger than 18 years of age who is ill with a viral infection or fever. It may cause severe liver or brain damage.  · Your appetite may be poor, so a light diet is fine. Avoid dehydration by drinking 6 to 8 glasses of fluids per day (such as water, soft drinks, sports drinks, juices, tea, or soup). Extra fluids will help loosen secretions in the nose and lungs.  · Over-the-counter cough, cold, and sore-throat medicines will not shorten the length of the illness, but they may be helpful to reduce symptoms. (Note: Do not use decongestants if you have high blood pressure.)  · Finish all antibiotic medicine. Do this even if you are feeling better after only a few days.  Follow-up care  Follow up with your healthcare provider, or as advised. If you had an X-ray or ECG (electrocardiogram), a specialist will review it. You will be notified of any new findings that may affect your care.  Note: If you are age 65 or older, or if you have a chronic lung disease or condition that affects your immune system, or you smoke, talk to your healthcare provider about having pneumococcal vaccinations and a yearly influenza vaccination (flu shot).  When to seek medical advice  Call your healthcare provider right away if any of these  occur:  · Fever of 100.4°F (38°C) or higher  · Coughing up increased amounts of colored sputum  · Weakness, drowsiness, headache, facial pain, ear pain, or a stiff neck  Call 911, or get immediate medical care  Contact emergency services right away if any of these occur.  · Coughing up blood  · Worsening weakness, drowsiness, headache, or stiff neck  · Trouble breathing, wheezing, or pain with breathing  Date Last Reviewed: 9/13/2015 © 2000-2017 [employer]. [address] [place] [address]. All rights reserved. This information is not intended as a substitute for professional medical care. Always follow your healthcare professional's instructions.

## 2018-12-30 NOTE — PROGRESS NOTES
"Subjective:       Patient ID: Concha Hess is a 68 y.o. female.    Vitals:    12/30/18 0910   BP: 120/74   Pulse: 92   Resp: 16   Temp: 98.4 °F (36.9 °C)   TempSrc: Oral   SpO2: 97%   Weight: 67.1 kg (148 lb)   Height: 5' 2" (1.575 m)       Chief Complaint: URI    Pt states she was here on 12/4 and was given abx and got a little better, but then the cough started to worsen on Sunday.  Pt states she has been taking mucinex dm, zyrtec, and delsym. Pt is out of the cough meds she was prescribed (Promethazine with Codeine and Tessalon 200). Pt is going out of town on Tuesday to [place].  She previously did not want steroids, she would like steroids at this time.  She would like to do a shot instead of pills of steroid and would also like a b12 shot.      URI    This is a recurrent problem. The current episode started 1 to 4 weeks ago. The problem has been unchanged. There has been no fever. The fever has been present for less than 1 day. Associated symptoms include congestion, coughing, a plugged ear sensation, rhinorrhea and sinus pain. Pertinent negatives include no abdominal pain, chest pain, diarrhea, dysuria, ear pain, headaches, joint pain, joint swelling, nausea, neck pain, rash, sneezing, sore throat, swollen glands, vomiting or wheezing. She has tried decongestant and antihistamine for the symptoms. The treatment provided mild relief.     Review of Systems   Constitution: Positive for malaise/fatigue. Negative for chills and fever.   HENT: Positive for congestion, hoarse voice, rhinorrhea and sinus pain. Negative for ear pain, sneezing and sore throat.    Eyes: Negative for discharge and redness.   Cardiovascular: Negative for chest pain, dyspnea on exertion and leg swelling.   Respiratory: Positive for cough and sputum production. Negative for shortness of breath and wheezing.    Skin: Negative for rash.   Musculoskeletal: Negative for joint pain, myalgias and neck pain.   Gastrointestinal: Negative " for abdominal pain, diarrhea, nausea and vomiting.   Genitourinary: Negative for dysuria.   Neurological: Negative for headaches.       Objective:      Physical Exam   Constitutional: She is oriented to person, place, and time. She appears well-developed and well-nourished. She is cooperative.  Non-toxic appearance. She does not appear ill. No distress.   HENT:   Head: Normocephalic and atraumatic.   Right Ear: Hearing, tympanic membrane, external ear and ear canal normal.   Left Ear: Hearing, tympanic membrane, external ear and ear canal normal.   Nose: Mucosal edema and rhinorrhea present. No nasal deformity. No epistaxis. Right sinus exhibits no maxillary sinus tenderness and no frontal sinus tenderness. Left sinus exhibits no maxillary sinus tenderness and no frontal sinus tenderness.   Mouth/Throat: Uvula is midline and mucous membranes are normal. No trismus in the jaw. Normal dentition. No uvula swelling. Posterior oropharyngeal erythema (postnasal drip, hoarse voice) present. No oropharyngeal exudate or posterior oropharyngeal edema.   Eyes: Conjunctivae and lids are normal. No scleral icterus.   Sclera clear bilat   Neck: Trachea normal, full passive range of motion without pain and phonation normal. Neck supple.   Cardiovascular: Normal rate, regular rhythm, normal heart sounds, intact distal pulses and normal pulses.   Pulmonary/Chest: Effort normal and breath sounds normal. No respiratory distress. She has no wheezes.   Green productive cough during exam   Abdominal: Soft. Normal appearance and bowel sounds are normal. She exhibits no distension. There is no tenderness.   Musculoskeletal: Normal range of motion. She exhibits no edema or deformity.   Lymphadenopathy:     She has no cervical adenopathy.   Neurological: She is alert and oriented to person, place, and time. She exhibits normal muscle tone. Coordination normal.   Skin: Skin is warm, dry and intact. No rash noted. She is not diaphoretic. No  pallor.   Psychiatric: She has a normal mood and affect. Her speech is normal and behavior is normal. Judgment and thought content normal. Cognition and memory are normal.   Nursing note and vitals reviewed.      Results for orders placed or performed in visit on 12/30/18   POCT Influenza A/B   Result Value Ref Range    Rapid Influenza A Ag Negative Negative    Rapid Influenza B Ag Negative Negative     Acceptable Yes      X-ray Chest Pa And Lateral    Result Date: 12/30/2018  EXAMINATION: XR CHEST PA AND LATERAL CLINICAL HISTORY: Cough TECHNIQUE: PA and lateral views of the chest were performed. COMPARISON: 03/14/2017 FINDINGS: Cardiac silhouette remains within the normal limits for size.  Thoracic aorta is tortuous but appears unchanged from prior.    Marked elevation of the left hemidiaphragm appears unchanged with mild associated atelectasis versus scarring in the left lung base.  Right lung remains clear with no definite parenchymal consolidation or pleural effusion visualized.  No acute osseous findings demonstrated.     Unchanged appearance of the chest as above Electronically signed by: Scott Hubbard MD Date:    12/30/2018 Time:    10:00  Assessment:       1. Acute purulent bronchitis    2. Productive cough        Plan:       Concha was seen today for uri.    Diagnoses and all orders for this visit:    Acute purulent bronchitis  -     promethazine-codeine 6.25-10 mg/5 ml (PHENERGAN WITH CODEINE) 6.25-10 mg/5 mL syrup; Use 5-10mL nightly as needed for severe cough  -     benzonatate (TESSALON) 200 MG capsule; Take 1 capsule (200 mg total) by mouth 3 (three) times daily as needed for Cough.  -     doxycycline (MONODOX) 100 MG capsule; Take 1 capsule (100 mg total) by mouth 2 (two) times daily. for 10 days  -     betamethasone acetate-betamethasone sodium phosphate injection 6 mg    Productive cough  -     POCT Influenza A/B  -     X-Ray Chest PA And Lateral; Future      Patient  Instructions   Please drink plenty of fluids.  Please get plenty of rest.  Please return here or go to the Emergency Department for any concerns or worsening of condition.  If you were prescribed antibiotics, please take them to completion.  If you were prescribed a narcotic medication, do not drive or operate heavy equipment or machinery while taking these medications.  If you were given a steroid shot in the clinic and have also been given a prescription for a steroid such as Prednisone or a Medrol Dose Pack, please begin taking them tomorrow.  If you do not have Hypertension or any history of palpitations, it is ok to take over the counter Sudafed or Mucinex D or Allegra-D or Claritin-D or Zyrtec-D.  If you do take one of the above, it is ok to combine that with plain over the counter Mucinex or Allegra or Claritin or Zyrtec.  If for example you are taking Zyrtec -D, you can combine that with Mucinex, but not Mucinex-D.  If you are taking Mucinex-D, you can combine that with plain Allegra or Claritin or Zyrtec.   If you do have Hypertension or palpitations, it is safe to take Coricidin HBP for relief of sinus symptoms.  If not allergic, please take over the counter Tylenol (Acetaminophen) and/or Motrin (Ibuprofen) as directed for control of pain and/or fever.  Please follow up with your primary care doctor or specialist as needed.    If you  smoke, please stop smoking.  Bronchitis, Antibiotic Treatment (Adult)    Bronchitis is an infection of the air passages (bronchial tubes) in your lungs. It often occurs when you have a cold. This illness is contagious during the first few days and is spread through the air by coughing and sneezing, or by direct contact (touching the sick person and then touching your own eyes, nose, or mouth).  Symptoms of bronchitis include cough with mucus (phlegm) and low-grade fever. Bronchitis usually lasts 7 to 14 days. Mild cases can be treated with simple home remedies. More severe  infection is treated with an antibiotic.  Home care  Follow these guidelines when caring for yourself at home:  · If your symptoms are severe, rest at home for the first 2 to 3 days. When you go back to your usual activities, don't let yourself get too tired.  · Do not smoke. Also avoid being exposed to secondhand smoke.  · You may use over-the-counter medicines to control fever or pain, unless another medicine was prescribed. (Note: If you have chronic liver or kidney disease or have ever had a stomach ulcer or gastrointestinal bleeding, talk with your healthcare provider before using these medicines. Also talk to your provider if you are taking medicine to prevent blood clots.) Aspirin should never be given to anyone younger than 18 years of age who is ill with a viral infection or fever. It may cause severe liver or brain damage.  · Your appetite may be poor, so a light diet is fine. Avoid dehydration by drinking 6 to 8 glasses of fluids per day (such as water, soft drinks, sports drinks, juices, tea, or soup). Extra fluids will help loosen secretions in the nose and lungs.  · Over-the-counter cough, cold, and sore-throat medicines will not shorten the length of the illness, but they may be helpful to reduce symptoms. (Note: Do not use decongestants if you have high blood pressure.)  · Finish all antibiotic medicine. Do this even if you are feeling better after only a few days.  Follow-up care  Follow up with your healthcare provider, or as advised. If you had an X-ray or ECG (electrocardiogram), a specialist will review it. You will be notified of any new findings that may affect your care.  Note: If you are age 65 or older, or if you have a chronic lung disease or condition that affects your immune system, or you smoke, talk to your healthcare provider about having pneumococcal vaccinations and a yearly influenza vaccination (flu shot).  When to seek medical advice  Call your healthcare provider right away if  any of these occur:  · Fever of 100.4°F (38°C) or higher  · Coughing up increased amounts of colored sputum  · Weakness, drowsiness, headache, facial pain, ear pain, or a stiff neck  Call 911, or get immediate medical care  Contact emergency services right away if any of these occur.  · Coughing up blood  · Worsening weakness, drowsiness, headache, or stiff neck  · Trouble breathing, wheezing, or pain with breathing  Date Last Reviewed: 9/13/2015 © 2000-2017 [employer]. [address] [place] [id number]. All rights reserved. This information is not intended as a substitute for professional medical care. Always follow your healthcare professional's instructions.

## 2019-01-10 ENCOUNTER — OFFICE VISIT (OUTPATIENT)
Dept: PRIMARY CARE CLINIC | Facility: CLINIC | Age: 69
End: 2019-01-10
Payer: MEDICARE

## 2019-01-10 VITALS
TEMPERATURE: 98 F | HEIGHT: 62 IN | DIASTOLIC BLOOD PRESSURE: 69 MMHG | RESPIRATION RATE: 18 BRPM | HEART RATE: 104 BPM | OXYGEN SATURATION: 98 % | BODY MASS INDEX: 27.42 KG/M2 | WEIGHT: 149 LBS | SYSTOLIC BLOOD PRESSURE: 109 MMHG

## 2019-01-10 DIAGNOSIS — N36.2 URETHRAL CARUNCLE: ICD-10-CM

## 2019-01-10 DIAGNOSIS — J44.9 CHRONIC OBSTRUCTIVE PULMONARY DISEASE, UNSPECIFIED COPD TYPE: ICD-10-CM

## 2019-01-10 DIAGNOSIS — N95.2 VAGINAL ATROPHY: ICD-10-CM

## 2019-01-10 DIAGNOSIS — J44.1 COPD WITH ACUTE EXACERBATION: Primary | ICD-10-CM

## 2019-01-10 PROCEDURE — 99214 OFFICE O/P EST MOD 30 MIN: CPT | Mod: HCNC,S$GLB,, | Performed by: FAMILY MEDICINE

## 2019-01-10 PROCEDURE — 1101F PR PT FALLS ASSESS DOC 0-1 FALLS W/OUT INJ PAST YR: ICD-10-PCS | Mod: CPTII,HCNC,S$GLB, | Performed by: FAMILY MEDICINE

## 2019-01-10 PROCEDURE — 99999 PR PBB SHADOW E&M-EST. PATIENT-LVL III: ICD-10-PCS | Mod: PBBFAC,HCNC,, | Performed by: FAMILY MEDICINE

## 2019-01-10 PROCEDURE — 99214 PR OFFICE/OUTPT VISIT, EST, LEVL IV, 30-39 MIN: ICD-10-PCS | Mod: HCNC,S$GLB,, | Performed by: FAMILY MEDICINE

## 2019-01-10 PROCEDURE — 1101F PT FALLS ASSESS-DOCD LE1/YR: CPT | Mod: CPTII,HCNC,S$GLB, | Performed by: FAMILY MEDICINE

## 2019-01-10 PROCEDURE — 99999 PR PBB SHADOW E&M-EST. PATIENT-LVL III: CPT | Mod: PBBFAC,HCNC,, | Performed by: FAMILY MEDICINE

## 2019-01-10 RX ORDER — ESTRADIOL 0.1 MG/G
CREAM VAGINAL
Qty: 42.5 G | Refills: 3 | Status: SHIPPED | OUTPATIENT
Start: 2019-01-10 | End: 2019-06-12 | Stop reason: SDUPTHER

## 2019-01-10 RX ORDER — IPRATROPIUM BROMIDE AND ALBUTEROL SULFATE 2.5; .5 MG/3ML; MG/3ML
3 SOLUTION RESPIRATORY (INHALATION) EVERY 6 HOURS PRN
Qty: 150 ML | Refills: 5 | Status: SHIPPED | OUTPATIENT
Start: 2019-01-10 | End: 2019-10-21

## 2019-01-10 RX ORDER — PREDNISONE 20 MG/1
TABLET ORAL
Qty: 10 TABLET | Refills: 0 | Status: SHIPPED | OUTPATIENT
Start: 2019-01-10 | End: 2019-01-17

## 2019-01-10 RX ORDER — FLUTICASONE FUROATE AND VILANTEROL TRIFENATATE 100; 25 UG/1; UG/1
1 POWDER RESPIRATORY (INHALATION) DAILY
Qty: 60 EACH | Refills: 5 | Status: SHIPPED | OUTPATIENT
Start: 2019-01-10 | End: 2019-07-05

## 2019-01-10 RX ORDER — ALBUTEROL SULFATE 90 UG/1
2 AEROSOL, METERED RESPIRATORY (INHALATION) EVERY 4 HOURS PRN
Qty: 18 G | Refills: 5 | Status: SHIPPED | OUTPATIENT
Start: 2019-01-10 | End: 2020-03-17

## 2019-01-10 RX ORDER — AZITHROMYCIN 250 MG/1
TABLET, FILM COATED ORAL
Qty: 6 TABLET | Refills: 0 | Status: SHIPPED | OUTPATIENT
Start: 2019-01-10 | End: 2019-01-15

## 2019-01-10 NOTE — PROGRESS NOTES
"Subjective:       Patient ID: Concha Hess is a 68 y.o. female.    Chief Complaint: Cough (since early December )    Was seen in urgent care 12/4, treated with zpak for sinusitis, green sputum cleared up, but cough persisted. Went back to  late December, got Celestone and doxycycline but no improvement. CXR 12/30 unchanged from prior studies. Has had persistent cough productive of clear sputum, sometimes violent coughing paroxysms with post-tussive emesis. No fever, but chills yesterday,       Review of Systems   Constitutional: Positive for chills and fatigue. Negative for fever.   HENT: Negative for trouble swallowing.    Eyes: Negative for visual disturbance.   Respiratory: Positive for cough, shortness of breath and wheezing.    Gastrointestinal: Positive for vomiting.   Genitourinary: Negative for difficulty urinating.   Musculoskeletal: Negative for joint swelling and myalgias.   Skin: Negative for rash.   Allergic/Immunologic: Negative for immunocompromised state.   Hematological: Does not bruise/bleed easily.   Psychiatric/Behavioral: Positive for sleep disturbance. Negative for agitation and confusion.       Objective:      Vitals:    01/10/19 1140   BP: 109/69   BP Location: Left arm   Patient Position: Sitting   BP Method: Medium (Automatic)   Pulse: 104   Resp: 18   Temp: 98 °F (36.7 °C)   TempSrc: Oral   SpO2: 98%   Weight: 67.6 kg (149 lb)   Height: 5' 2" (1.575 m)     Physical Exam    Assessment:       1. COPD with acute exacerbation    2. Vaginal atrophy    3. Urethral caruncle    4. Chronic obstructive pulmonary disease, unspecified COPD type        Plan:       COPD with acute exacerbation  -     X-Ray Chest PA And Lateral; Future; Expected date: 01/10/2019  -     NEBULIZER FOR HOME USE  -     albuterol-ipratropium (DUO-NEB) 2.5 mg-0.5 mg/3 mL nebulizer solution; Take 3 mLs by nebulization every 6 (six) hours as needed for Wheezing or Shortness of Breath. Rescue  Dispense: 150 mL; Refill: " 5  -     albuterol (VENTOLIN HFA) 90 mcg/actuation inhaler; Inhale 2 puffs into the lungs every 4 (four) hours as needed for Wheezing or Shortness of Breath. Rescue  Dispense: 18 g; Refill: 5  -     predniSONE (DELTASONE) 20 MG tablet; Take 2 tablets (40 mg total) by mouth once daily for 3 days, THEN 1 tablet (20 mg total) once daily for 4 days.  Dispense: 10 tablet; Refill: 0  -     azithromycin (Z-SANDY) 250 MG tablet; Take 2 tablets (500 mg total) by mouth once daily for 1 day, THEN 1 tablet (250 mg total) once daily for 4 days.  Dispense: 6 tablet; Refill: 0  Chest x-ray reviewed, no acute abnormalities.  Continue ICS/LABA combo, add rescue medication as needed.  Vaginal atrophy  -     estradiol (ESTRACE) 0.01 % (0.1 mg/gram) vaginal cream; Use 0.5 gram of estrogen cream in vagina nightly x 2 weeks, then twice a week thereafter  Dispense: 42.5 g; Refill: 3    Urethral caruncle  -     estradiol (ESTRACE) 0.01 % (0.1 mg/gram) vaginal cream; Use 0.5 gram of estrogen cream in vagina nightly x 2 weeks, then twice a week thereafter  Dispense: 42.5 g; Refill: 3    Chronic obstructive pulmonary disease, unspecified COPD type  -     NEBULIZER FOR HOME USE  -     BREO ELLIPTA 100-25 mcg/dose diskus inhaler; Inhale 1 puff into the lungs once daily.  Dispense: 60 each; Refill: 5         Medication List           Accurate as of 1/10/19  1:19 PM. If you have any questions, ask your nurse or doctor.               START taking these medications    albuterol 90 mcg/actuation inhaler  Commonly known as:  VENTOLIN HFA  Inhale 2 puffs into the lungs every 4 (four) hours as needed for Wheezing or Shortness of Breath. Rescue  Started by:  John Vinson MD     albuterol-ipratropium 2.5 mg-0.5 mg/3 mL nebulizer solution  Commonly known as:  DUO-NEB  Take 3 mLs by nebulization every 6 (six) hours as needed for Wheezing or Shortness of Breath. Rescue  Started by:  John Vinson MD     predniSONE 20 MG tablet  Commonly known as:   DELTASONE  Take 2 tablets (40 mg total) by mouth once daily for 3 days, THEN 1 tablet (20 mg total) once daily for 4 days.  Start taking on:  1/10/2019  Started by:  John Vinson MD        CHANGE how you take these medications    azithromycin 250 MG tablet  Commonly known as:  Z-SANDY  Take 2 tablets (500 mg total) by mouth once daily for 1 day, THEN 1 tablet (250 mg total) once daily for 4 days.  Start taking on:  1/10/2019  What changed:  See the new instructions.  Changed by:  John Vinson MD     BREO ELLIPTA 100-25 mcg/dose diskus inhaler  Generic drug:  fluticasone-vilanterol  Inhale 1 puff into the lungs once daily.  What changed:  how to take this  Changed by:  John Vinson MD     fluticasone 50 mcg/actuation nasal spray  Commonly known as:  FLONASE  2 sprays (100 mcg total) by Each Nare route once daily.  What changed:  Another medication with the same name was removed. Continue taking this medication, and follow the directions you see here.  Changed by:  John Vinson MD        CONTINUE taking these medications    benzonatate 200 MG capsule  Commonly known as:  TESSALON  Take 1 capsule (200 mg total) by mouth 3 (three) times daily as needed for Cough.     cetirizine 10 mg chewable tablet     clorazepate 7.5 MG Tab  Commonly known as:  TRANXENE  TAKE 1 TABLET BY MOUTH TWICE A DAY     escitalopram oxalate 10 MG tablet  Commonly known as:  LEXAPRO  Take 1 tablet (10 mg total) by mouth once daily.     esomeprazole 20 MG capsule  Commonly known as:  NEXIUM     estradiol 0.01 % (0.1 mg/gram) vaginal cream  Commonly known as:  ESTRACE  Use 0.5 gram of estrogen cream in vagina nightly x 2 weeks, then twice a week thereafter     rosuvastatin 5 MG tablet  Commonly known as:  CRESTOR  Take 1 tablet (5 mg total) by mouth once daily.     ULTIMATE WOMEN'S COMPLETE 50+ ORAL        STOP taking these medications    amoxicillin 500 MG capsule  Commonly known as:  AMOXIL  Stopped by:  John Vinson MD      aspirin 81 MG EC tablet  Commonly known as:  ECOTRIN  Stopped by:  John Vinson MD     diclofenac 50 MG tablet  Commonly known as:  CATAFLAM  Stopped by:  John Vinson MD     doxycycline 100 MG capsule  Commonly known as:  MONODOX  Stopped by:  John Vinson MD     ergocalciferol 50,000 unit Cap  Commonly known as:  ERGOCALCIFEROL  Stopped by:  John Vinson MD      MG tablet  Generic drug:  ibuprofen  Stopped by:  John Vinson MD     ibuprofen 600 MG tablet  Commonly known as:  ADVIL,MOTRIN  Stopped by:  John Vinson MD     phenazopyridine 100 MG tablet  Commonly known as:  PYRIDIUM  Stopped by:  John Vinson MD     promethazine-codeine 6.25-10 mg/5 ml 6.25-10 mg/5 mL syrup  Commonly known as:  PHENERGAN with CODEINE  Stopped by:  John Vinson MD     sulfamethoxazole-trimethoprim 800-160mg 800-160 mg Tab  Commonly known as:  BACTRIM DS  Stopped by:  John Vinson MD           Where to Get Your Medications      These medications were sent to [id number] [address] AT [address] [address]    Phone:  [phone]   · albuterol 90 mcg/actuation inhaler  · albuterol-ipratropium 2.5 mg-0.5 mg/3 mL nebulizer solution  · azithromycin 250 MG tablet  · BREO ELLIPTA 100-25 mcg/dose diskus inhaler  · estradiol 0.01 % (0.1 mg/gram) vaginal cream  · predniSONE 20 MG tablet

## 2019-01-14 RX ORDER — PROMETHAZINE HYDROCHLORIDE AND CODEINE PHOSPHATE 6.25; 1 MG/5ML; MG/5ML
5 SOLUTION ORAL EVERY 6 HOURS PRN
Qty: 120 ML | Refills: 0 | Status: SHIPPED | OUTPATIENT
Start: 2019-01-14 | End: 2019-06-12

## 2019-01-14 RX ORDER — ONDANSETRON 4 MG/1
4 TABLET, ORALLY DISINTEGRATING ORAL EVERY 6 HOURS PRN
Qty: 20 TABLET | Refills: 1 | Status: SHIPPED | OUTPATIENT
Start: 2019-01-14 | End: 2019-07-05

## 2019-01-15 DIAGNOSIS — J44.1 COPD WITH ACUTE EXACERBATION: Primary | ICD-10-CM

## 2019-01-16 DIAGNOSIS — J44.9 CHRONIC OBSTRUCTIVE PULMONARY DISEASE, UNSPECIFIED COPD TYPE: Primary | ICD-10-CM

## 2019-01-17 ENCOUNTER — HOSPITAL ENCOUNTER (OUTPATIENT)
Dept: PULMONOLOGY | Facility: CLINIC | Age: 69
Discharge: HOME | End: 2019-01-17
Payer: MEDICARE

## 2019-01-17 ENCOUNTER — OFFICE VISIT (OUTPATIENT)
Dept: PULMONOLOGY | Facility: CLINIC | Age: 69
End: 2019-01-17
Payer: MEDICARE

## 2019-01-17 VITALS
WEIGHT: 150 LBS | HEIGHT: 62 IN | BODY MASS INDEX: 27.6 KG/M2 | HEART RATE: 80 BPM | OXYGEN SATURATION: 95 % | DIASTOLIC BLOOD PRESSURE: 76 MMHG | SYSTOLIC BLOOD PRESSURE: 132 MMHG

## 2019-01-17 DIAGNOSIS — J44.9 CHRONIC OBSTRUCTIVE PULMONARY DISEASE, UNSPECIFIED COPD TYPE: ICD-10-CM

## 2019-01-17 LAB
PRE FEV1 FVC: 71
PRE FEV1: 1.18
PRE FVC: 1.66
PREDICTED FEV1 FVC: 80
PREDICTED FEV1: 2.1
PREDICTED FVC: 2.67

## 2019-01-17 PROCEDURE — 99214 OFFICE O/P EST MOD 30 MIN: CPT | Mod: HCNC,S$GLB,, | Performed by: INTERNAL MEDICINE

## 2019-01-17 PROCEDURE — 99999 PR PBB SHADOW E&M-EST. PATIENT-LVL IV: CPT | Mod: PBBFAC,HCNC,, | Performed by: INTERNAL MEDICINE

## 2019-01-17 PROCEDURE — 99999 PR PBB SHADOW E&M-EST. PATIENT-LVL IV: ICD-10-PCS | Mod: PBBFAC,HCNC,, | Performed by: INTERNAL MEDICINE

## 2019-01-17 PROCEDURE — 1101F PT FALLS ASSESS-DOCD LE1/YR: CPT | Mod: CPTII,HCNC,S$GLB, | Performed by: INTERNAL MEDICINE

## 2019-01-17 PROCEDURE — 1101F PR PT FALLS ASSESS DOC 0-1 FALLS W/OUT INJ PAST YR: ICD-10-PCS | Mod: CPTII,HCNC,S$GLB, | Performed by: INTERNAL MEDICINE

## 2019-01-17 PROCEDURE — 99214 PR OFFICE/OUTPT VISIT, EST, LEVL IV, 30-39 MIN: ICD-10-PCS | Mod: HCNC,S$GLB,, | Performed by: INTERNAL MEDICINE

## 2019-01-17 RX ORDER — FLUTICASONE FUROATE AND VILANTEROL 200; 25 UG/1; UG/1
1 POWDER RESPIRATORY (INHALATION) DAILY
Qty: 1 EACH | Refills: 5 | Status: SHIPPED | OUTPATIENT
Start: 2019-01-17 | End: 2019-07-30 | Stop reason: SDUPTHER

## 2019-01-17 RX ORDER — PREDNISONE 10 MG/1
TABLET ORAL
Qty: 24 TABLET | Refills: 0 | Status: SHIPPED | OUTPATIENT
Start: 2019-01-17 | End: 2019-06-12

## 2019-01-17 NOTE — LETTER
January 21, 2019      John Vinson MD  [address]  [address]  [facility] [id number]           [facility]  [address]  [place] [address]  Phone: [phone]          Patient: Concha Hess   MR Number: [id number]   YOB: 1950   Date of Visit: 1/17/2019       Dear Dr. John Vinson:    Thank you for referring Concha Hess to me for evaluation. Attached you will find relevant portions of my assessment and plan of care.    If you have questions, please do not hesitate to call me. I look forward to following Concha Hess along with you.    Sincerely,    Rubens Snider MD    Enclosure  CC:  No Recipients    If you would like to receive this communication electronically, please contact [email or web address] or [phone] to request more information on [employer] access.    For providers and/or their staff who would like to refer a patient to [facility], please contact us through our one-stop-shop provider referral line, [facility], at [phone].    If you feel you have received this communication in error or would no longer like to receive these types of communications, please e-mail [email or web address]

## 2019-01-21 NOTE — PROGRESS NOTES
Subjective:      Patient ID: Concha Hess is a 68 y.o. female.    Chief Complaint: COPD and Cough    HPI Patient comes accompanied by her sister. She his coughing after a URI. I last saw her in September for follow up on a 4  Mm nodule. She comes today after having been to urgent care twice for a cough.  Her PFT's today show a fall in FVC suggesting air trapping. She has an elevated left hemidiaphragm.FEV-1: is down from 1.42 to 1.18 but the per centage is the same: 71%      No flowsheet data found.  Review of Systems   Constitutional: Negative.    HENT: Negative.    Eyes: Negative.    Respiratory: Positive for cough, wheezing and dyspnea on extertion.    Cardiovascular: Negative.    Genitourinary: Negative.    Musculoskeletal: Negative.    Skin: Negative.    Gastrointestinal: Negative.    Neurological: Negative.    Psychiatric/Behavioral: The patient is nervous/anxious.      Objective:     Physical Exam   Constitutional: She is oriented to person, place, and time. She appears well-developed and well-nourished. No distress.   HENT:   Head: Normocephalic and atraumatic.   Right Ear: External ear normal.   Left Ear: External ear normal.   Nose: Nose normal.   Mouth/Throat: Oropharynx is clear and moist.   Eyes: Conjunctivae and EOM are normal. Pupils are equal, round, and reactive to light.   Neck: Normal range of motion. Neck supple. No JVD present. No thyromegaly present.   Cardiovascular: Normal rate, regular rhythm, normal heart sounds and intact distal pulses. Exam reveals no gallop.   No murmur heard.  Pulmonary/Chest: Breath sounds normal. No stridor. No respiratory distress. She has no wheezes. She has no rales. She exhibits no tenderness.   Mild end expiratory wheeze.    Abdominal: Soft. Bowel sounds are normal. She exhibits no distension and no mass. There is no tenderness. There is no rebound and no guarding.   Musculoskeletal: Normal range of motion. She exhibits no edema.   Lymphadenopathy:     She  has no cervical adenopathy.   Neurological: She is alert and oriented to person, place, and time. She has normal reflexes. She displays normal reflexes. No cranial nerve deficit.   Skin: Skin is warm and dry. No rash noted.   Psychiatric: She has a normal mood and affect. Her behavior is normal. Judgment and thought content normal.   Nursing note and vitals reviewed.      Assessment:     1. Chronic obstructive pulmonary disease, unspecified COPD type      Outpatient Encounter Medications as of 1/17/2019   Medication Sig Dispense Refill    albuterol (VENTOLIN HFA) 90 mcg/actuation inhaler Inhale 2 puffs into the lungs every 4 (four) hours as needed for Wheezing or Shortness of Breath. Rescue 18 g 5    albuterol-ipratropium (DUO-NEB) 2.5 mg-0.5 mg/3 mL nebulizer solution Take 3 mLs by nebulization every 6 (six) hours as needed for Wheezing or Shortness of Breath. Rescue 150 mL 5    BREO ELLIPTA 100-25 mcg/dose diskus inhaler Inhale 1 puff into the lungs once daily. 60 each 5    cetirizine 10 mg chewable tablet Take 10 mg by mouth once daily.      clorazepate (TRANXENE) 7.5 MG Tab TAKE 1 TABLET BY MOUTH TWICE A DAY 60 tablet 3    escitalopram oxalate (LEXAPRO) 10 MG tablet Take 1 tablet (10 mg total) by mouth once daily. 90 tablet 3    esomeprazole (NEXIUM) 20 MG capsule Take 20 mg by mouth before breakfast.      estradiol (ESTRACE) 0.01 % (0.1 mg/gram) vaginal cream Use 0.5 gram of estrogen cream in vagina nightly x 2 weeks, then twice a week thereafter 42.5 g 3    fluticasone (FLONASE) 50 mcg/actuation nasal spray 2 sprays (100 mcg total) by Each Nare route once daily. 1 Bottle 0    fluticasone-vilanterol (BREO ELLIPTA) 200-25 mcg/dose DsDv diskus inhaler Inhale 1 puff into the lungs once daily. Controller 1 each 5    MULTIVITS-MIN/IRON/FA/LUTEIN (ULTIMATE WOMEN'S COMPLETE 50+ ORAL) Take by mouth.      ondansetron (ZOFRAN-ODT) 4 MG TbDL Take 1 tablet (4 mg total) by mouth every 6 (six) hours as needed  (nausea). 20 tablet 1    predniSONE (DELTASONE) 10 MG tablet 2 tabs  X 7 days then 2tabs X tabs then 7 days 24 tablet 0    [] predniSONE (DELTASONE) 20 MG tablet Take 2 tablets (40 mg total) by mouth once daily for 3 days, THEN 1 tablet (20 mg total) once daily for 4 days. 10 tablet 0    promethazine-codeine 6.25-10 mg/5 ml (PHENERGAN WITH CODEINE) 6.25-10 mg/5 mL syrup Take 5 mLs by mouth every 6 (six) hours as needed for Cough. 120 mL 0    rosuvastatin (CRESTOR) 5 MG tablet Take 1 tablet (5 mg total) by mouth once daily. 90 tablet 1     No facility-administered encounter medications on file as of 2019.        Plan:   Will restart her BREO and give a pulse of prednisone. She is having asthmatic bronchitis with air trapping.  Problem List Items Addressed This Visit     COPD (chronic obstructive pulmonary disease)

## 2019-02-07 ENCOUNTER — OFFICE VISIT (OUTPATIENT)
Dept: PULMONOLOGY | Facility: CLINIC | Age: 69
End: 2019-02-07
Payer: MEDICARE

## 2019-02-07 ENCOUNTER — HOSPITAL ENCOUNTER (OUTPATIENT)
Dept: PULMONOLOGY | Facility: CLINIC | Age: 69
Discharge: HOME | End: 2019-02-07
Payer: MEDICARE

## 2019-02-07 VITALS
OXYGEN SATURATION: 96 % | SYSTOLIC BLOOD PRESSURE: 128 MMHG | HEIGHT: 62 IN | DIASTOLIC BLOOD PRESSURE: 74 MMHG | WEIGHT: 146 LBS | BODY MASS INDEX: 26.87 KG/M2 | HEART RATE: 99 BPM

## 2019-02-07 DIAGNOSIS — J45.30 MILD PERSISTENT ASTHMA NOT DEPENDENT ON SYSTEMIC STEROIDS: Primary | ICD-10-CM

## 2019-02-07 DIAGNOSIS — R06.00 DYSPNEA, UNSPECIFIED TYPE: ICD-10-CM

## 2019-02-07 DIAGNOSIS — J44.9 CHRONIC OBSTRUCTIVE PULMONARY DISEASE, UNSPECIFIED COPD TYPE: ICD-10-CM

## 2019-02-07 LAB
PRE FEV1 FVC: 69
PRE FEV1: 1.41
PRE FVC: 2.04
PREDICTED FEV1 FVC: 80
PREDICTED FEV1: 2.1
PREDICTED FVC: 2.67

## 2019-02-07 PROCEDURE — 99999 PR PBB SHADOW E&M-EST. PATIENT-LVL III: ICD-10-PCS | Mod: PBBFAC,HCNC,, | Performed by: INTERNAL MEDICINE

## 2019-02-07 PROCEDURE — 94729 PR C02/MEMBANE DIFFUSE CAPACITY: ICD-10-PCS | Mod: HCNC,S$GLB,, | Performed by: INTERNAL MEDICINE

## 2019-02-07 PROCEDURE — 94010 BREATHING CAPACITY TEST: ICD-10-PCS | Mod: HCNC,S$GLB,, | Performed by: INTERNAL MEDICINE

## 2019-02-07 PROCEDURE — 99214 OFFICE O/P EST MOD 30 MIN: CPT | Mod: 25,HCNC,S$GLB, | Performed by: INTERNAL MEDICINE

## 2019-02-07 PROCEDURE — 1101F PR PT FALLS ASSESS DOC 0-1 FALLS W/OUT INJ PAST YR: ICD-10-PCS | Mod: HCNC,CPTII,S$GLB, | Performed by: INTERNAL MEDICINE

## 2019-02-07 PROCEDURE — 1101F PT FALLS ASSESS-DOCD LE1/YR: CPT | Mod: HCNC,CPTII,S$GLB, | Performed by: INTERNAL MEDICINE

## 2019-02-07 PROCEDURE — 99999 PR PBB SHADOW E&M-EST. PATIENT-LVL III: CPT | Mod: PBBFAC,HCNC,, | Performed by: INTERNAL MEDICINE

## 2019-02-07 PROCEDURE — 94010 BREATHING CAPACITY TEST: CPT | Mod: HCNC,S$GLB,, | Performed by: INTERNAL MEDICINE

## 2019-02-07 PROCEDURE — 99214 PR OFFICE/OUTPT VISIT, EST, LEVL IV, 30-39 MIN: ICD-10-PCS | Mod: 25,HCNC,S$GLB, | Performed by: INTERNAL MEDICINE

## 2019-02-07 PROCEDURE — 94729 DIFFUSING CAPACITY: CPT | Mod: HCNC,S$GLB,, | Performed by: INTERNAL MEDICINE

## 2019-02-07 NOTE — PROGRESS NOTES
Subjective:      Patient ID: Concha Hess is a 68 y.o. female.    Chief Complaint: COPD and Shortness of Breath    HPI  Patient returns after re starting BREO and a pulse of prednisone. She is feeling great and there has been a 25% improvement in lung function and today's studies are virtually identical to the studies of Feb, 2018: FVC:2.04 liters 78% and Fev-1: 1.41 liters, 69% of FVC   Wheezes have cleared.       No flowsheet data found.  Review of Systems   Constitutional: Negative.    HENT: Negative.    Eyes: Negative.    Respiratory: Negative.         Resolved exacerbation of asthma   Cardiovascular: Negative.    Genitourinary: Negative.    Musculoskeletal: Negative.    Skin: Negative.    Gastrointestinal: Negative.         Hx of GERD's   Neurological: Negative.    Psychiatric/Behavioral: Negative.      Objective:     Physical Exam   Constitutional: She is oriented to person, place, and time. She appears well-developed and well-nourished. No distress.   HENT:   Head: Normocephalic and atraumatic.   Right Ear: External ear normal.   Left Ear: External ear normal.   Nose: Nose normal.   Mouth/Throat: Oropharynx is clear and moist.   Eyes: Conjunctivae and EOM are normal. Pupils are equal, round, and reactive to light.   Neck: Normal range of motion. Neck supple. No JVD present. No thyromegaly present.   Cardiovascular: Normal rate, regular rhythm, normal heart sounds and intact distal pulses. Exam reveals no gallop.   No murmur heard.  Pulmonary/Chest: Breath sounds normal. No stridor. No respiratory distress. She has no wheezes. She has no rales. She exhibits no tenderness.   Clear:     Fev-1: 1.41 liters: 69% of FVC   Abdominal: Soft. Bowel sounds are normal. She exhibits no distension and no mass. There is no tenderness. There is no rebound and no guarding.   Musculoskeletal: Normal range of motion. She exhibits no edema.   Lymphadenopathy:     She has no cervical adenopathy.   Neurological: She is  alert and oriented to person, place, and time. She has normal reflexes. She displays normal reflexes. No cranial nerve deficit.   Skin: Skin is warm and dry. No rash noted.   Psychiatric: She has a normal mood and affect. Her behavior is normal. Judgment and thought content normal.   Nursing note and vitals reviewed.      Assessment:     1. Mild persistent asthma not dependent on systemic steroids      Outpatient Encounter Medications as of 2/7/2019   Medication Sig Dispense Refill    albuterol (VENTOLIN HFA) 90 mcg/actuation inhaler Inhale 2 puffs into the lungs every 4 (four) hours as needed for Wheezing or Shortness of Breath. Rescue 18 g 5    albuterol-ipratropium (DUO-NEB) 2.5 mg-0.5 mg/3 mL nebulizer solution Take 3 mLs by nebulization every 6 (six) hours as needed for Wheezing or Shortness of Breath. Rescue 150 mL 5    BREO ELLIPTA 100-25 mcg/dose diskus inhaler Inhale 1 puff into the lungs once daily. 60 each 5    cetirizine 10 mg chewable tablet Take 10 mg by mouth once daily.      clorazepate (TRANXENE) 7.5 MG Tab TAKE 1 TABLET BY MOUTH TWICE A DAY 60 tablet 3    escitalopram oxalate (LEXAPRO) 10 MG tablet Take 1 tablet (10 mg total) by mouth once daily. 90 tablet 3    esomeprazole (NEXIUM) 20 MG capsule Take 20 mg by mouth before breakfast.      estradiol (ESTRACE) 0.01 % (0.1 mg/gram) vaginal cream Use 0.5 gram of estrogen cream in vagina nightly x 2 weeks, then twice a week thereafter 42.5 g 3    fluticasone (FLONASE) 50 mcg/actuation nasal spray 2 sprays (100 mcg total) by Each Nare route once daily. 1 Bottle 0    fluticasone-vilanterol (BREO ELLIPTA) 200-25 mcg/dose DsDv diskus inhaler Inhale 1 puff into the lungs once daily. Controller 1 each 5    MULTIVITS-MIN/IRON/FA/LUTEIN (ULTIMATE WOMEN'S COMPLETE 50+ ORAL) Take by mouth.      ondansetron (ZOFRAN-ODT) 4 MG TbDL Take 1 tablet (4 mg total) by mouth every 6 (six) hours as needed (nausea). 20 tablet 1    predniSONE (DELTASONE) 10 MG  tablet 2 tabs  X 7 days then 2tabs X tabs then 7 days 24 tablet 0    promethazine-codeine 6.25-10 mg/5 ml (PHENERGAN WITH CODEINE) 6.25-10 mg/5 mL syrup Take 5 mLs by mouth every 6 (six) hours as needed for Cough. 120 mL 0    rosuvastatin (CRESTOR) 5 MG tablet Take 1 tablet (5 mg total) by mouth once daily. 90 tablet 1     No facility-administered encounter medications on file as of 2/7/2019.        Plan:   Resolved asthma exacerbation. Continue BREO daily  Problem List Items Addressed This Visit     None      Visit Diagnoses     Mild persistent asthma not dependent on systemic steroids    -  Primary

## 2019-03-12 ENCOUNTER — TELEPHONE (OUTPATIENT)
Dept: UROGYNECOLOGY | Facility: CLINIC | Age: 69
End: 2019-03-12

## 2019-03-12 NOTE — TELEPHONE ENCOUNTER
----- Message from Nadeen Yen sent at 3/12/2019 10:21 AM CDT -----  Contact: Patient   Patient called to schedule annual exam but experiencing issues she would like to address as well. The patient would like to be seen on the next available Thursday. The patient can be reached at [phone].

## 2019-03-20 DIAGNOSIS — Z12.11 COLON CANCER SCREENING: ICD-10-CM

## 2019-04-08 ENCOUNTER — TELEPHONE (OUTPATIENT)
Dept: PULMONOLOGY | Facility: CLINIC | Age: 69
End: 2019-04-08

## 2019-04-08 NOTE — TELEPHONE ENCOUNTER
----- Message from Blanca Melo sent at 4/8/2019 11:54 AM CDT -----  Contact:   Patient    [phone]  Needs Advice    Reason for call:     Schedule an appt , pt having breathing issues         Communication Preference:   Phone     Additional Information:

## 2019-04-08 NOTE — TELEPHONE ENCOUNTER
"Mrs Hess is calling for a appointment before she leaves for a vacation in 2 weeks. Her complaint is hoarseness x 3 weeks and difficulty in taking a breath, but no pain, SOB or cough. She was able to take a pilates class today. She says her breathing sounds "funny' and she can come tomorrow or 4-18 for a appointment. I told her I would speak to Dr Snider when he returns to clinic tomorrow. Joyce Tan LPN.  "

## 2019-04-23 RX ORDER — CLORAZEPATE DIPOTASSIUM 7.5 MG/1
TABLET ORAL
Qty: 60 TABLET | Refills: 3 | Status: SHIPPED | OUTPATIENT
Start: 2019-04-23 | End: 2019-08-22 | Stop reason: SDUPTHER

## 2019-04-24 ENCOUNTER — OFFICE VISIT (OUTPATIENT)
Dept: OTOLARYNGOLOGY | Facility: CLINIC | Age: 69
End: 2019-04-24
Payer: MEDICARE

## 2019-04-24 VITALS
SYSTOLIC BLOOD PRESSURE: 123 MMHG | BODY MASS INDEX: 27.14 KG/M2 | WEIGHT: 148.38 LBS | DIASTOLIC BLOOD PRESSURE: 80 MMHG | HEART RATE: 73 BPM

## 2019-04-24 DIAGNOSIS — J38.2 VOCAL CORD NODULES: ICD-10-CM

## 2019-04-24 DIAGNOSIS — J30.9 ALLERGIC RHINITIS, UNSPECIFIED SEASONALITY, UNSPECIFIED TRIGGER: ICD-10-CM

## 2019-04-24 DIAGNOSIS — R49.0 DYSPHONIA: Primary | ICD-10-CM

## 2019-04-24 PROCEDURE — 99204 OFFICE O/P NEW MOD 45 MIN: CPT | Mod: 25,HCNC,S$GLB, | Performed by: OTOLARYNGOLOGY

## 2019-04-24 PROCEDURE — 31575 DIAGNOSTIC LARYNGOSCOPY: CPT | Mod: HCNC,S$GLB,, | Performed by: OTOLARYNGOLOGY

## 2019-04-24 PROCEDURE — 31575 PR LARYNGOSCOPY, FLEXIBLE; DIAGNOSTIC: ICD-10-PCS | Mod: HCNC,S$GLB,, | Performed by: OTOLARYNGOLOGY

## 2019-04-24 PROCEDURE — 99999 PR PBB SHADOW E&M-EST. PATIENT-LVL III: ICD-10-PCS | Mod: PBBFAC,HCNC,, | Performed by: OTOLARYNGOLOGY

## 2019-04-24 PROCEDURE — 99204 PR OFFICE/OUTPT VISIT, NEW, LEVL IV, 45-59 MIN: ICD-10-PCS | Mod: 25,HCNC,S$GLB, | Performed by: OTOLARYNGOLOGY

## 2019-04-24 PROCEDURE — 1101F PT FALLS ASSESS-DOCD LE1/YR: CPT | Mod: HCNC,CPTII,S$GLB, | Performed by: OTOLARYNGOLOGY

## 2019-04-24 PROCEDURE — 31231 NASAL ENDOSCOPY DX: CPT | Mod: 59,HCNC,S$GLB, | Performed by: OTOLARYNGOLOGY

## 2019-04-24 PROCEDURE — 31231 PR NASAL ENDOSCOPY, DX: ICD-10-PCS | Mod: 59,HCNC,S$GLB, | Performed by: OTOLARYNGOLOGY

## 2019-04-24 PROCEDURE — 99999 PR PBB SHADOW E&M-EST. PATIENT-LVL III: CPT | Mod: PBBFAC,HCNC,, | Performed by: OTOLARYNGOLOGY

## 2019-04-24 PROCEDURE — 1101F PR PT FALLS ASSESS DOC 0-1 FALLS W/OUT INJ PAST YR: ICD-10-PCS | Mod: HCNC,CPTII,S$GLB, | Performed by: OTOLARYNGOLOGY

## 2019-04-24 NOTE — PROGRESS NOTES
Head and Neck Surgery Consult    HPI: Concha Hess is a 68 y.o. female presenting with 7 months of early vocal fatigue and voice coarseness. She had tried to make an appointment with Dr. Rudolph regarding her voice complaints but was redirected to our clinic. She is a former smoker having quit many years ago and is a social drinker. She is preparing for a trip to [place] next week. She also reports L maxillary facial pressure for a few days, and her dentist informed her she had sinusitis. She has known allergic rhinitis for which she is currently not taking anything apart from PO antihistamine, which has not improved her symptoms. She reports early vocal fatigue and is very garrulous. She has COPD and is on steroid inhaler QD as well as a rescue inhaler that she does not frequently use.    Past Medical History:   Diagnosis Date    Acid reflux     Chronic lung disease     COPD (chronic obstructive pulmonary disease)     Depression     Gastric ulcer     Hyperlipidemia     Paralysis of diaphragm     left    PONV (postoperative nausea and vomiting)        Past Surgical History:   Procedure Laterality Date    APPENDECTOMY      BREAST SURGERY      AUGMENTATION     SECTION      COSMETIC SURGERY      FACE LIFT breast reduction    HYSTEROSCOPY  2017    [id number] N/A 3/21/2017    Performed by Alexandra Thompson MD at [facility] OR         Current Outpatient Medications:     albuterol (VENTOLIN HFA) 90 mcg/actuation inhaler, Inhale 2 puffs into the lungs every 4 (four) hours as needed for Wheezing or Shortness of Breath. Rescue, Disp: 18 g, Rfl: 5    albuterol-ipratropium (DUO-NEB) 2.5 mg-0.5 mg/3 mL nebulizer solution, Take 3 mLs by nebulization every 6 (six) hours as needed for Wheezing or Shortness of Breath. Rescue, Disp: 150 mL, Rfl: 5    BREO ELLIPTA 100-25 mcg/dose diskus inhaler, Inhale 1 puff into the lungs once daily., Disp: 60 each, Rfl: 5    cetirizine 10 mg chewable  tablet, Take 10 mg by mouth once daily., Disp: , Rfl:     clorazepate (TRANXENE) 7.5 MG Tab, TAKE 1 TABLET BY MOUTH TWICE DAILY, Disp: 60 tablet, Rfl: 3    escitalopram oxalate (LEXAPRO) 10 MG tablet, Take 1 tablet (10 mg total) by mouth once daily., Disp: 90 tablet, Rfl: 3    esomeprazole (NEXIUM) 20 MG capsule, Take 20 mg by mouth before breakfast., Disp: , Rfl:     estradiol (ESTRACE) 0.01 % (0.1 mg/gram) vaginal cream, Use 0.5 gram of estrogen cream in vagina nightly x 2 weeks, then twice a week thereafter, Disp: 42.5 g, Rfl: 3    fluticasone (FLONASE) 50 mcg/actuation nasal spray, 2 sprays (100 mcg total) by Each Nare route once daily., Disp: 1 Bottle, Rfl: 0    fluticasone-vilanterol (BREO ELLIPTA) 200-25 mcg/dose DsDv diskus inhaler, Inhale 1 puff into the lungs once daily. Controller, Disp: 1 each, Rfl: 5    MULTIVITS-MIN/IRON/FA/LUTEIN (ULTIMATE WOMEN'S COMPLETE 50+ ORAL), Take by mouth., Disp: , Rfl:     ondansetron (ZOFRAN-ODT) 4 MG TbDL, Take 1 tablet (4 mg total) by mouth every 6 (six) hours as needed (nausea)., Disp: 20 tablet, Rfl: 1    predniSONE (DELTASONE) 10 MG tablet, 2 tabs  X 7 days then 2tabs X tabs then 7 days, Disp: 24 tablet, Rfl: 0    promethazine-codeine 6.25-10 mg/5 ml (PHENERGAN WITH CODEINE) 6.25-10 mg/5 mL syrup, Take 5 mLs by mouth every 6 (six) hours as needed for Cough., Disp: 120 mL, Rfl: 0    rosuvastatin (CRESTOR) 5 MG tablet, Take 1 tablet (5 mg total) by mouth once daily., Disp: 90 tablet, Rfl: 1    Review of patient's allergies indicates:   Allergen Reactions    Dilaudid [hydromorphone] Nausea And Vomiting    Morphine Nausea And Vomiting       Family History   Problem Relation Age of Onset    Cancer Mother     Heart disease Father     Colon cancer Sister     Cervical cancer Neg Hx     Endometrial cancer Neg Hx     Vaginal cancer Neg Hx     Breast cancer Neg Hx     Ovarian cancer Neg Hx        Social History     Socioeconomic History    Marital status:       Spouse name: Not on file    Number of children: Not on file    Years of education: Not on file    Highest education level: Not on file   Occupational History    Not on file   Social Needs    Financial resource strain: Not on file    Food insecurity:     Worry: Not on file     Inability: Not on file    Transportation needs:     Medical: Not on file     Non-medical: Not on file   Tobacco Use    Smoking status: Former Smoker     Packs/day: 1.00     Years: 29.00     Pack years: 29.00     Types: Cigarettes    Smokeless tobacco: Never Used   Substance and Sexual Activity    Alcohol use: Yes    Drug use: No    Sexual activity: Not Currently   Lifestyle    Physical activity:     Days per week: Not on file     Minutes per session: Not on file    Stress: Not on file   Relationships    Social connections:     Talks on phone: Not on file     Gets together: Not on file     Attends Worship service: Not on file     Active member of club or organization: Not on file     Attends meetings of clubs or organizations: Not on file     Relationship status: Not on file   Other Topics Concern    Not on file   Social History Narrative    Not on file       Review of Systems -  Constitutional: Denies having night sweats, constant fatigue, loss of appetite or recent substantial weight loss.  Eyes: Denies blurred vision or double vision.  Respiratory: Denies symptoms of shortness of breath, noisy breathing, hoarseness or chronic cough.  GI: Denies symptoms of heartburn, acid regurgitation, or the known presence of a hiatal hernia.  The remainder of a 10-point review of systems is negative    REVIEW OF RADIOLOGICAL FILMS AND RECORDS (PERSONALLY REVIEWED):  Unable to load panorex dfrom dentist    REVIEW OF LABS (PERSONALLY REVIEWED)  Noncontributory    PHYSICAL EXAM:  Vitals - /80 (Patient Position: Sitting)   Pulse 73   Wt 67.3 kg (148 lb 5.9 oz)   BMI 27.14 kg/m²   Constitutional -      General Appearance:  well developed, well nourished, without obvious deformities     Communication: speaks with a normal voice without hoarseness  Head & Face -     Overall: no obvious scars, lesions or masses     Parotid and submandibular glands: no masses or tenderness     Facial strength: normal and equal bilaterally  Eyes -      EOM intact  Ear, Nose, Mouth & Throat -     Ears: both left and right external auditory canals and TM's are normal, no external deformities     Nasal exam: mucosa is pink, septum is midline, visible turbinates are normal on anterior rhinoscopy. There is copious clear mucus, turbinates are blue-hued. No mucopus.     Mastication: teeth appear in good repair, several crowns and veneers     Oral Cavity and oropharynx: mucosa, hard and soft palates, tongue, posterior pharyngeal wall, lips and gums are without lesions. Tonsils appear minimal  Respiratory:     Breathing unlabored, no stridor  Cardiovascular:     No JVD, capillary refill normal  Larynx: using the mirror for indirect laryngoscopy, the epiglottic, false cords, true cords, and pyriform sinuses are without lesions and the true vocal cords move normally  Neck: appears symmetric, and on palpation is without masses   Endocrine:     Thyroid: no asymmetry, thyromegaly, or thyroid nodules on palpation  Lymphatic:     No cervical lymphadenopathy  Cranial Nerves:      II: Pupillary reflexes normal     III, IV, VI: EOM normal     V: 1,2,3: normal sensation     VII: Normal strength in all divisions     IX, X: Normal voice, palatal elevation and sensation     XI: Shoulder strength normal       XII: Tongue mobility normal  Psychiatric:     Appropriate affect    NASAL ENDOSCOPY     Indications:  Facial pressure and rhinorrhea     Procedure:  With the patient sitting upright, informed consent was obtained.  Topical anesthesia and vasoconstriction was applied to the nares bilaterally.  After waiting an appropriate period of time for anesthesia/vasoconstriction to  become effective, an endoscope was passed through both nares, and the nose and nasopharynx were examined.  The patient tolerated the procedure without complications.     Findings: The nasal passageways and nasopharynx appear normal without edema or erythema.  The septum appears R-deviated, mild. The inferior turbinates appear 2-3+ and blue There are no intranasal masses or visible mucopus. The Eustachian tubes are patent. The airway is widely patent.      FLEXIBLE LARYNGOSCOPY     Indications:  hoarseness     Procedure:  With the patient sitting upright, informed consent was obtained.  Topical anesthesia and vasoconstriction was applied to both nares.  After waiting an appropriate period of time for anesthesia/vasoconstriction to become effective, a flexible laryngoscope was passed through the L side(s) of the nose and the nose, nasopharynx, oropharynx, hypopharynx and larynx were examined.  The patient tolerated the procedure without complications.     Findings: The nasal passageways and nasopharynx appear normal without edema or erythema.  The oropharynx, base of tongue, piriform sinuses, epiglottis, and aryepiglottic folds were examined and no masses, lesions, or ulcerations were seen. There are early vocal cord nodules bilaterally. The true vocal cords move well to midline bilaterally.  The airway is widely patent.      ASSESSMENT: Early vocal cord nodules, allergic rhinosinusitis    PLAN: We discussed her voice symptoms and the natural history of vocal cord nodules. I recommended speech therapy as these appear early and soft, and therapy can help them from maturing into a surgical problem. I do not see any evidence of acute infection today, certainly nothing that would keep her from going on her planned vacation.       John Larios

## 2019-04-26 DIAGNOSIS — J44.9 CHRONIC OBSTRUCTIVE PULMONARY DISEASE, UNSPECIFIED COPD TYPE: ICD-10-CM

## 2019-04-26 DIAGNOSIS — J45.30 MILD PERSISTENT ASTHMA NOT DEPENDENT ON SYSTEMIC STEROIDS: Primary | ICD-10-CM

## 2019-04-30 ENCOUNTER — TELEPHONE (OUTPATIENT)
Dept: SPEECH THERAPY | Facility: HOSPITAL | Age: 69
End: 2019-04-30

## 2019-05-02 ENCOUNTER — TELEPHONE (OUTPATIENT)
Dept: SPEECH THERAPY | Facility: HOSPITAL | Age: 69
End: 2019-05-02

## 2019-05-10 ENCOUNTER — OFFICE VISIT (OUTPATIENT)
Dept: URGENT CARE | Facility: CLINIC | Age: 69
End: 2019-05-10
Payer: MEDICARE

## 2019-05-10 VITALS
SYSTOLIC BLOOD PRESSURE: 126 MMHG | WEIGHT: 148 LBS | TEMPERATURE: 98 F | OXYGEN SATURATION: 99 % | RESPIRATION RATE: 18 BRPM | BODY MASS INDEX: 27.23 KG/M2 | DIASTOLIC BLOOD PRESSURE: 85 MMHG | HEART RATE: 81 BPM | HEIGHT: 62 IN

## 2019-05-10 DIAGNOSIS — S00.83XA CONTUSION OF FACE, INITIAL ENCOUNTER: ICD-10-CM

## 2019-05-10 DIAGNOSIS — S09.90XA INJURY OF HEAD, INITIAL ENCOUNTER: Primary | ICD-10-CM

## 2019-05-10 DIAGNOSIS — J01.00 ACUTE MAXILLARY SINUSITIS, RECURRENCE NOT SPECIFIED: ICD-10-CM

## 2019-05-10 PROCEDURE — 1101F PT FALLS ASSESS-DOCD LE1/YR: CPT | Mod: CPTII,S$GLB,, | Performed by: EMERGENCY MEDICINE

## 2019-05-10 PROCEDURE — 99214 OFFICE O/P EST MOD 30 MIN: CPT | Mod: S$GLB,,, | Performed by: EMERGENCY MEDICINE

## 2019-05-10 PROCEDURE — 99214 PR OFFICE/OUTPT VISIT, EST, LEVL IV, 30-39 MIN: ICD-10-PCS | Mod: S$GLB,,, | Performed by: EMERGENCY MEDICINE

## 2019-05-10 PROCEDURE — 1101F PR PT FALLS ASSESS DOC 0-1 FALLS W/OUT INJ PAST YR: ICD-10-PCS | Mod: CPTII,S$GLB,, | Performed by: EMERGENCY MEDICINE

## 2019-05-10 RX ORDER — METHOCARBAMOL 500 MG/1
1000 TABLET, FILM COATED ORAL 4 TIMES DAILY
Qty: 56 TABLET | Refills: 0 | Status: SHIPPED | OUTPATIENT
Start: 2019-05-10 | End: 2019-05-17

## 2019-05-10 NOTE — PROGRESS NOTES
"Subjective:       Patient ID: Concha Hess is a 68 y.o. female.    Vitals:    05/10/19 1023   BP: 126/85   Pulse: 81   Resp: 18   Temp: 98.1 °F (36.7 °C)   SpO2: 99%   Weight: 67.1 kg (148 lb)   Height: 5' 2" (1.575 m)       Chief Complaint: Facial Injury (fell this morning LT SIDE ) and Cough (STARTED ON WEDNESDAY )    Facial Injury    The incident occurred 1 to 3 hours ago. The injury mechanism was a fall. There was no loss of consciousness. There was no blood loss. The quality of the pain is described as aching and dull. The pain is at a severity of 4/10. The pain has been constant since the injury. Associated symptoms include headaches. Pertinent negatives include no numbness or weakness. She has tried ice for the symptoms. The treatment provided mild relief.     Review of Systems   Constitution: Negative for malaise/fatigue.   HENT: Negative for nosebleeds.    Cardiovascular: Negative for chest pain and syncope.   Respiratory: Positive for cough and sputum production (GREEN ). Negative for shortness of breath.    Musculoskeletal: Positive for falls. Negative for back pain, joint pain and neck pain.   Gastrointestinal: Negative for abdominal pain.   Genitourinary: Negative for hematuria.   Neurological: Positive for headaches. Negative for dizziness, numbness and weakness.       Objective:      Physical Exam   Constitutional: She is oriented to person, place, and time. She appears well-developed and well-nourished. She is cooperative.  Non-toxic appearance. She does not appear ill. No distress.   HENT:   Head: Normocephalic. Head is with contusion.       Right Ear: Hearing, tympanic membrane, external ear and ear canal normal.   Left Ear: Hearing, tympanic membrane, external ear and ear canal normal.   Nose: Mucosal edema and rhinorrhea present. No nasal deformity. No epistaxis. Right sinus exhibits maxillary sinus tenderness. Right sinus exhibits no frontal sinus tenderness. Left sinus exhibits maxillary " sinus tenderness. Left sinus exhibits no frontal sinus tenderness.   Mouth/Throat: Uvula is midline, oropharynx is clear and moist and mucous membranes are normal. No trismus in the jaw. Normal dentition. No uvula swelling. No posterior oropharyngeal erythema.   Patient has laryngitis     Eyes: Conjunctivae and lids are normal. No scleral icterus.   Sclera clear bilat   Neck: Trachea normal, full passive range of motion without pain and phonation normal. Neck supple. Muscular tenderness present. No spinous process tenderness present. No neck rigidity. Decreased range of motion present. No edema and no erythema present.       Cardiovascular: Normal rate, regular rhythm, normal heart sounds, intact distal pulses and normal pulses.   Pulmonary/Chest: Effort normal and breath sounds normal. No respiratory distress.   Abdominal: Soft. Normal appearance and bowel sounds are normal. She exhibits no distension. There is no tenderness.   Musculoskeletal: She exhibits no edema or deformity.   Neurological: She is alert and oriented to person, place, and time. She exhibits normal muscle tone. Coordination normal.   Skin: Skin is warm, dry and intact. She is not diaphoretic. No pallor.   Psychiatric: She has a normal mood and affect. Her speech is normal and behavior is normal. Judgment and thought content normal. Cognition and memory are normal.   Nursing note and vitals reviewed.      Assessment:       1. Injury of head, initial encounter    2. Contusion of face, initial encounter    3. Acute maxillary sinusitis, recurrence not specified        Plan:       Concha was seen today for facial injury and cough.    Diagnoses and all orders for this visit:    Injury of head, initial encounter    Contusion of face, initial encounter    Acute maxillary sinusitis, recurrence not specified    Other orders  -     methocarbamol (ROBAXIN) 500 MG Tab; Take 2 tablets (1,000 mg total) by mouth 4 (four) times daily. for 7 days           Patient Instructions     Please return here or go to the Emergency Department for any concerns or worsening of condition      Start taking antibiotics if not improved in 3 days    Zyrtec, Claritin, or Allegra OTC as directed for the next 7 days    Flonase OTC as directed for the next 7 days    Salt Water Nasal Spray OTC 3x/day for the next 7 days    Tylenol 500mg 2 tabs by mouth every 8 hours  Motrin 200mg 2 tabs by mouth every 8 hours  Alternate Tylenol and Motrin every 4hours    Mucinex or Robitussin OTC as directed for cough    Sudafed as directed for runny nose and congestion        Follow up with Primary Care or ENT if not improved in 7-10 Days:        Sinusitis (No Antibiotics)    The sinuses are air-filled spaces within the bones of the face. They connect to the inside of the nose. Sinusitis is an inflammation of the tissue lining the sinus cavity. Sinus inflammation can occur during a cold. It can also be due to allergies to pollens and other particles in the air. It can cause symptoms such as sinus congestion, headache, sore throat, facial swelling and fullness. It may also cause a low-grade fever. No infection is present, and no antibiotic treatment is needed.  Home care  · Drink plenty of water, hot tea, and other liquids. This may help thin mucus. It also may promote sinus drainage.  · Heat may help soothe painful areas of the face. Use a towel soaked in hot water. Or,  the shower and direct the hot spray onto your face. Using a vaporizer along with a menthol rub at night may also help.   · An expectorant containing guaifenesin may help thin the mucus and promote drainage from the sinuses.  · Over-the-counter decongestants may be used unless a similar medicine was prescribed. Nasal sprays work the fastest. Use one that contains phenylephrine or oxymetazoline. First blow the nose gently. Then use the spray. Do not use these medicines more often than directed on the label or symptoms may  get worse. You may also use tablets containing pseudoephedrine. Avoid products that combine ingredients, because side effects may be increased. Read labels. You can also ask the pharmacist for help. (NOTE: Persons with high blood pressure should not use decongestants. They can raise blood pressure.)  · Over-the-counter antihistamines may help if allergies contributed to your sinusitis.    · Use acetaminophen or ibuprofen to control pain, unless another pain medicine was prescribed. (If you have chronic liver or kidney disease or ever had a stomach ulcer, talk with your doctor before using these medicines. Aspirin should never be used in anyone under 18 years of age who is ill with a fever. It may cause severe liver damage.)  · Use nasal rinses or irrigation as instructed by your health care provider.  · Don't smoke. This can worsen symptoms.  Follow-up care  Follow up with your healthcare provider or our staff if you are not improving within the next week.  When to seek medical advice  Call your healthcare provider if any of these occur:  · Green or yellow discharge from the nose or into the throat  · Facial pain or headache becoming more severe  · Stiff neck  · Unusual drowsiness or confusion  · Swelling of the forehead or eyelids  · Vision problems, including blurred or double vision  · Fever of 100.4ºF (38ºC) or higher, or as directed by your healthcare provider  · Seizure  · Breathing problems  · Symptoms not resolving within 10 days  Date Last Reviewed: 4/13/2015  © 7766-1883 [employer]. [address] [place] [address], [place] [address]. All rights reserved. This information is not intended as a substitute for professional medical care. Always follow your healthcare professional's instructions.      When to Use Antibiotics   Antibiotics are medicines used to treat infections caused by bacteria. They dont work for illnesses caused by viruses or an allergic reaction. In fact, taking antibiotics for  reasons other than a bacterial infection can cause problems. For example, you may have side effects from the medicine. And if you really need an antibiotic, it may not work well.                                                                                                                                              When antibiotics wont help  Your healthcare provider wont usually prescribe antibiotics for the following conditions. You can help by not asking for them if you have:   · A cold. This type of illness is caused by a virus. It can cause a runny nose, stuffed-up nose, sneezing, coughing, headache, mild body aches, and low fever. A cold gets better on its own in a few days to a week.  · The flu (influenza). This is a respiratory illness caused by a virus. The flu usually goes away on its own in a week or so. It can cause fever, body aches, sore throat, and fatigue.  · Bronchitis. This is an infection in the lungs most often caused by a virus. You may have coughing, phlegm, body aches, and a low fever. A common type of bronchitis is known as a chest cold (acute bronchitis). This often happens after you have a respiratory infection like a common cold. Bronchitis can take weeks to go away, but antibiotics usually dont help.  · Most sore throats. Sore throats are most often caused by viruses. Your throat may feel scratchy or achy, and it may hurt to swallow. You may also have a low fever and body aches. A sore throat usually gets better in a few days.  · Most ear infections. An ear infection may be caused by a virus or bacteria. It causes pain in the ear. Antibiotics usually dont help, and the infection goes away on its own.  · Most sinus infections (sinusitis). This kind of infection causes sinus pain and swelling, and a runny nose. In most cases, sinusitis goes away on its own, and antibiotics dont make recovery quicker.  · Allergic rhinitis. This is a set of symptoms caused by an allergic reaction. You  may have sneezing, a runny nose, itchy or watery eyes, or a sore throat. Allergies are not treated with antibiotics.  · Low fever. A mild fever thats less than 100.4°F (38°C) most likely doesnt need treatment with antibiotics.   When antibiotics can help   Antibiotics can be used to treat:                                                     · Strep throat. This is a throat infectioncaused by a certain type of bacteria. Symptoms of strep throat include a sore throat, white patches on the tonsils, red spots on the roof of the mouth, fever, body aches, and nausea and vomiting.  · Urinary tract infection (UTI). This is a bacterial infection of the bladder and the tube that takes urine out of the body. It can cause burning pain and urine thats cloudy or tinted with blood. UTIs are very common. Antibiotics usually help treat these infections.  · Some ear infections. In some cases, a healthcare provider may prescribe antibiotics for an ear infection. You may need a test to show whats causing the ear infection.  · Some sinus infections. In some cases, yourhealthcare provider may give you antibiotics. He or she may first need to make sure your symptoms arent caused by a virus, fungus, allergies, or air pollutants such as smoke.   Your doctor may also recommend antibiotics if you have a condition that can affect your immune system, such as diabetes or cancer.   Self-care at home   If your infection cant be treated with antibiotics, you can take other steps to feel better. Try the remedies below. In general:   · Rest and sleep as much as needed.  · Drink water and other clear fluids.  · Dont smoke, and avoid smoke from other people.  · Use over-the-counter medicine such as acetaminophen to ease pain or fever, as directed by your healthcare provider.   To treat sinus pain or nasal congestion:   · Put a warm, moist washcloth on your face where you feel sinus pain or pressure.  · Use a nasal spray with medicine or saline,  "as directed by your healthcare provider.  · Breathe in steam from a hot shower.  · Use a humidifier or cool mist vaporizer.   To quiet a cough:   · Use a humidifier or cool mist vaporizer.  · Breathe in steam from a hot shower.  · Use cough lozenges.   To sooth a sore throat:   · Suck on ice chips, popsicles, or lozenges.  · Use a sore throat spray.  · Use a humidifier or cool mist vaporizer.  · Gargle with saltwater.  · Drink warm liquids.   To ease ear pain:   · Hold a warm, moist washcloth on the ear for 10 minutes at a time.  Date Last Reviewed: 9/1/2016 © 2000-2016 [employer]. [address] [place] [address], [place] [address]. All rights reserved. This information is not intended as a substitute for professional medical care. Always follow your healthcare professional's instructions.           Understanding Nasal Anatomy: Inside View  A lot happens under the surface of the nose. The bone and cartilage under the skin give the nose most of its size and shape. Other structures inside and behind the nose help you breathe. Learning the anatomy of the nose can help you better understand how the nose works.       Bone supports the upper third (bridge) of the nose. The upper cartilage supports the side of the nose. The lower cartilage adds support, width, and height. It helps shape the nostrils and the tip of the nose. Skin also helps shape the nose.          The nasal cavity is a hollow space behind the nose that air flows through. The septum is a thin "wall" made of cartilage and bone. It divides the inside of the nose into two chambers. The mucous membrane is thin tissue that lines the nose, sinuses, and throat. It warms and moistens the air you breathe in. It also makes the sticky mucus that helps clean the air of dust and other small particles. The turbinates on each side of the nose are curved, bony ridges lined with mucous membrane. They warm and moisten the air you breathe in. The sinuses are hollow, " air-filled chambers in the bone around your nose. Mucus from the sinuses drains into the nasal cavity.  Date Last Reviewed: 11/1/2016 © 2000-2016 [employer]. [address] [place] [address], [place] [address]. All rights reserved. This information is not intended as a substitute for professional medical care. Always follow your healthcare professional's instructions.           Head Injury (Adult)    You have a head injury. It does not appear serious at this time. But symptoms of a more serious problem, such as a mild brain injury (concussion) or bruising or bleeding in the brain, may appear later. For this reason, you or someone caring for you will need to watch for the symptoms listed below. Once youre home, also be sure to follow any care instructions youre given.  Home care  Watch for the following symptoms  Seek emergency medical care if you have any of these symptoms over the next hours to days:   · Headache  · Nausea or vomiting  · Dizziness  · Sensitivity to light or noise  · Unusual sleepiness or grogginess  · Trouble falling asleep  · Personality changes  · Vision changes  · Memory loss  · Confusion  · Trouble walking or clumsiness  · Loss of consciousness (even for a short time)  · Inability to be awakened  · Stiff neck  · Weakness or numbness in any part of the body  · Seizures  General care  · If you were prescribed medicines for pain, use them as directed. Note: Dont take other medicines for pain without talking to your provider first.  · To help reduce swelling and pain, apply a cold source to the injured area for up to 20 minutes at a time. Do this as often as directed. Use a cold pack or bag of ice wrapped in a thin towel. Never apply a cold source directly to the skin.  · If you have cuts or scrapes as a result of your head injury, care for them as directed.  · For the next 24 hours (or longer, if instructed):  ¨ Dont drink alcohol or use sedatives or other medicines that make you  sleepy.  ¨ Dont drive or operate machinery.  ¨ Dont do anything strenuous, such as heavy lifting or straining.  ¨ Limit tasks that require concentration. This includes reading, using a smartphone or computer, watching TV, and playing video games.  ¨ Dont return to sports or other activities that could result in another head injury.  Follow-up care  Follow up with your healthcare provider, or as directed. If imaging tests were done, they will be reviewed by a doctor. You will be told the results and any new findings that may affect your care.  When to seek medical advice  Call your healthcare provider right away if any of these occur:  · Pain doesnt get better or worsens  · New or increased swelling or bruising  · Fever of 100.4°F (38°C) or higher, or as directed by your provider  · Increased redness, warmth, drainage, or bleeding from the injured area  · Fluid drainage or bleeding from the nose or ears  · Any depression or bony abnormality in the injured area  Date Last Reviewed: 9/26/2015 © 2000-2017 [employer]. [address] [place] [address]. All rights reserved. This information is not intended as a substitute for professional medical care. Always follow your healthcare professional's instructions.        Facial Contusion  A contusion is another word for a bruise. It happens when small blood vessels break open and leak blood into the nearby area. A facial contusion can result from a bump, hit, or fall. This may happen during sports or an accident. Symptoms of a contusion often include changes in skin color (bruising), swelling, and pain.   The swelling from the contusion should decrease in a few days. Bruising and pain may take several weeks to go away.   Home care  · If you have been prescribed medicines for pain, take them as directed.  · To help reduce swelling and pain, wrap a cold pack or bag of frozen peas in a thin towel. Put it on the injured area for up to 20 minutes. Do  this a few times a day until the swelling goes down.   · If you have scrapes or cuts on your face requiring stiches or other closures, care for them as directed.  · For the next 24 hours (or longer if instructed):  ¨ Dont drink alcohol, or use sedatives or medicines that make you sleepy.  ¨ Dont drive or operate machinery.  ¨ Avoid doing anything strenuous. Dont lift or strain.  ¨ Do not return to sports or other activity that could result in another head injury.  Note about concussion  Because the injury was to your head, it is possible that a concussion (mild brain injury) could result. You don't have signs of a concussion at this time. But symptoms can show up later. Be alert for signs and symptoms of a concussion. Seek emergency medical care if any of these develop over the next hours to days:  · Headache  · Nausea or vomiting  · Dizziness  · Sensitivity to light or noise  · Unusual sleepiness or grogginess  · Trouble falling asleep  · Personality changes  · Vision changes  · Memory loss  · Confusion  · Trouble walking or clumsiness  · Loss of consciousness (even for a short time)  · Inability to be awakened   Follow-up care  Follow up with your healthcare provider or our staff as directed.  When to seek medical advice  Call your healthcare provider right away if any of these occur:  · Swelling or pain that gets worse, not better  · New swelling or pain  · Warmth or drainage from the swollen area or from cuts or scrapes  · Fluid drainage or bleeding from the nose or ears  · Fever of 100.4ºF (38ºC) or higher, or as directed by your healthcare provider  Date Last Reviewed: 5/7/2015  © 5706-7395 [employer]. [address] [place] [address], [place] [address]. All rights reserved. This information is not intended as a substitute for professional medical care. Always follow your healthcare professional's instructions.

## 2019-05-10 NOTE — PATIENT INSTRUCTIONS
Please return here or go to the Emergency Department for any concerns or worsening of condition      Start taking antibiotics if not improved in 3 days    Zyrtec, Claritin, or Allegra OTC as directed for the next 7 days    Flonase OTC as directed for the next 7 days    Salt Water Nasal Spray OTC 3x/day for the next 7 days    Tylenol 500mg 2 tabs by mouth every 8 hours  Motrin 200mg 2 tabs by mouth every 8 hours  Alternate Tylenol and Motrin every 4hours    Mucinex or Robitussin OTC as directed for cough    Sudafed as directed for runny nose and congestion        Follow up with Primary Care or ENT if not improved in 7-10 Days:  [id number]      Sinusitis (No Antibiotics)    The sinuses are air-filled spaces within the bones of the face. They connect to the inside of the nose. Sinusitis is an inflammation of the tissue lining the sinus cavity. Sinus inflammation can occur during a cold. It can also be due to allergies to pollens and other particles in the air. It can cause symptoms such as sinus congestion, headache, sore throat, facial swelling and fullness. It may also cause a low-grade fever. No infection is present, and no antibiotic treatment is needed.  Home care  · Drink plenty of water, hot tea, and other liquids. This may help thin mucus. It also may promote sinus drainage.  · Heat may help soothe painful areas of the face. Use a towel soaked in hot water. Or,  the shower and direct the hot spray onto your face. Using a vaporizer along with a menthol rub at night may also help.   · An expectorant containing guaifenesin may help thin the mucus and promote drainage from the sinuses.  · Over-the-counter decongestants may be used unless a similar medicine was prescribed. Nasal sprays work the fastest. Use one that contains phenylephrine or oxymetazoline. First blow the nose gently. Then use the spray. Do not use these medicines more often than directed on the label or symptoms may get worse. You may also use  tablets containing pseudoephedrine. Avoid products that combine ingredients, because side effects may be increased. Read labels. You can also ask the pharmacist for help. (NOTE: Persons with high blood pressure should not use decongestants. They can raise blood pressure.)  · Over-the-counter antihistamines may help if allergies contributed to your sinusitis.    · Use acetaminophen or ibuprofen to control pain, unless another pain medicine was prescribed. (If you have chronic liver or kidney disease or ever had a stomach ulcer, talk with your doctor before using these medicines. Aspirin should never be used in anyone under 18 years of age who is ill with a fever. It may cause severe liver damage.)  · Use nasal rinses or irrigation as instructed by your health care provider.  · Don't smoke. This can worsen symptoms.  Follow-up care  Follow up with your healthcare provider or our staff if you are not improving within the next week.  When to seek medical advice  Call your healthcare provider if any of these occur:  · Green or yellow discharge from the nose or into the throat  · Facial pain or headache becoming more severe  · Stiff neck  · Unusual drowsiness or confusion  · Swelling of the forehead or eyelids  · Vision problems, including blurred or double vision  · Fever of 100.4ºF (38ºC) or higher, or as directed by your healthcare provider  · Seizure  · Breathing problems  · Symptoms not resolving within 10 days  Date Last Reviewed: 4/13/2015  © 3135-8258 [employer]. [address] [place] [address]. All rights reserved. This information is not intended as a substitute for professional medical care. Always follow your healthcare professional's instructions.      When to Use Antibiotics   Antibiotics are medicines used to treat infections caused by bacteria. They dont work for illnesses caused by viruses or an allergic reaction. In fact, taking antibiotics for reasons other than a bacterial  infection can cause problems. For example, you may have side effects from the medicine. And if you really need an antibiotic, it may not work well.                                                                                                                                              When antibiotics wont help  Your healthcare provider wont usually prescribe antibiotics for the following conditions. You can help by not asking for them if you have:   · A cold. This type of illness is caused by a virus. It can cause a runny nose, stuffed-up nose, sneezing, coughing, headache, mild body aches, and low fever. A cold gets better on its own in a few days to a week.  · The flu (influenza). This is a respiratory illness caused by a virus. The flu usually goes away on its own in a week or so. It can cause fever, body aches, sore throat, and fatigue.  · Bronchitis. This is an infection in the lungs most often caused by a virus. You may have coughing, phlegm, body aches, and a low fever. A common type of bronchitis is known as a chest cold (acute bronchitis). This often happens after you have a respiratory infection like a common cold. Bronchitis can take weeks to go away, but antibiotics usually dont help.  · Most sore throats. Sore throats are most often caused by viruses. Your throat may feel scratchy or achy, and it may hurt to swallow. You may also have a low fever and body aches. A sore throat usually gets better in a few days.  · Most ear infections. An ear infection may be caused by a virus or bacteria. It causes pain in the ear. Antibiotics usually dont help, and the infection goes away on its own.  · Most sinus infections (sinusitis). This kind of infection causes sinus pain and swelling, and a runny nose. In most cases, sinusitis goes away on its own, and antibiotics dont make recovery quicker.  · Allergic rhinitis. This is a set of symptoms caused by an allergic reaction. You may have sneezing, a runny  nose, itchy or watery eyes, or a sore throat. Allergies are not treated with antibiotics.  · Low fever. A mild fever thats less than 100.4°F (38°C) most likely doesnt need treatment with antibiotics.   When antibiotics can help   Antibiotics can be used to treat:                                                     · Strep throat. This is a throat infectioncaused by a certain type of bacteria. Symptoms of strep throat include a sore throat, white patches on the tonsils, red spots on the roof of the mouth, fever, body aches, and nausea and vomiting.  · Urinary tract infection (UTI). This is a bacterial infection of the bladder and the tube that takes urine out of the body. It can cause burning pain and urine thats cloudy or tinted with blood. UTIs are very common. Antibiotics usually help treat these infections.  · Some ear infections. In some cases, a healthcare provider may prescribe antibiotics for an ear infection. You may need a test to show whats causing the ear infection.  · Some sinus infections. In some cases, yourhealthcare provider may give you antibiotics. He or she may first need to make sure your symptoms arent caused by a virus, fungus, allergies, or air pollutants such as smoke.   Your doctor may also recommend antibiotics if you have a condition that can affect your immune system, such as diabetes or cancer.   Self-care at home   If your infection cant be treated with antibiotics, you can take other steps to feel better. Try the remedies below. In general:   · Rest and sleep as much as needed.  · Drink water and other clear fluids.  · Dont smoke, and avoid smoke from other people.  · Use over-the-counter medicine such as acetaminophen to ease pain or fever, as directed by your healthcare provider.   To treat sinus pain or nasal congestion:   · Put a warm, moist washcloth on your face where you feel sinus pain or pressure.  · Use a nasal spray with medicine or saline, as directed by your  "healthcare provider.  · Breathe in steam from a hot shower.  · Use a humidifier or cool mist vaporizer.   To quiet a cough:   · Use a humidifier or cool mist vaporizer.  · Breathe in steam from a hot shower.  · Use cough lozenges.   To sooth a sore throat:   · Suck on ice chips, popsicles, or lozenges.  · Use a sore throat spray.  · Use a humidifier or cool mist vaporizer.  · Gargle with saltwater.  · Drink warm liquids.   To ease ear pain:   · Hold a warm, moist washcloth on the ear for 10 minutes at a time.  Date Last Reviewed: 9/1/2016 © 2000-2016 [employer]. [address] [place] [address], [place] [address]. All rights reserved. This information is not intended as a substitute for professional medical care. Always follow your healthcare professional's instructions.           Understanding Nasal Anatomy: Inside View  A lot happens under the surface of the nose. The bone and cartilage under the skin give the nose most of its size and shape. Other structures inside and behind the nose help you breathe. Learning the anatomy of the nose can help you better understand how the nose works.       Bone supports the upper third (bridge) of the nose. The upper cartilage supports the side of the nose. The lower cartilage adds support, width, and height. It helps shape the nostrils and the tip of the nose. Skin also helps shape the nose.          The nasal cavity is a hollow space behind the nose that air flows through. The septum is a thin "wall" made of cartilage and bone. It divides the inside of the nose into two chambers. The mucous membrane is thin tissue that lines the nose, sinuses, and throat. It warms and moistens the air you breathe in. It also makes the sticky mucus that helps clean the air of dust and other small particles. The turbinates on each side of the nose are curved, bony ridges lined with mucous membrane. They warm and moisten the air you breathe in. The sinuses are hollow, air-filled chambers in " the bone around your nose. Mucus from the sinuses drains into the nasal cavity.  Date Last Reviewed: 11/1/2016 © 2000-2016 [employer]. [address] [place] [address], [place] [address]. All rights reserved. This information is not intended as a substitute for professional medical care. Always follow your healthcare professional's instructions.           Head Injury (Adult)    You have a head injury. It does not appear serious at this time. But symptoms of a more serious problem, such as a mild brain injury (concussion) or bruising or bleeding in the brain, may appear later. For this reason, you or someone caring for you will need to watch for the symptoms listed below. Once youre home, also be sure to follow any care instructions youre given.  Home care  Watch for the following symptoms  Seek emergency medical care if you have any of these symptoms over the next hours to days:   · Headache  · Nausea or vomiting  · Dizziness  · Sensitivity to light or noise  · Unusual sleepiness or grogginess  · Trouble falling asleep  · Personality changes  · Vision changes  · Memory loss  · Confusion  · Trouble walking or clumsiness  · Loss of consciousness (even for a short time)  · Inability to be awakened  · Stiff neck  · Weakness or numbness in any part of the body  · Seizures  General care  · If you were prescribed medicines for pain, use them as directed. Note: Dont take other medicines for pain without talking to your provider first.  · To help reduce swelling and pain, apply a cold source to the injured area for up to 20 minutes at a time. Do this as often as directed. Use a cold pack or bag of ice wrapped in a thin towel. Never apply a cold source directly to the skin.  · If you have cuts or scrapes as a result of your head injury, care for them as directed.  · For the next 24 hours (or longer, if instructed):  ¨ Dont drink alcohol or use sedatives or other medicines that make you sleepy.  ¨ Dont drive or  operate machinery.  ¨ Dont do anything strenuous, such as heavy lifting or straining.  ¨ Limit tasks that require concentration. This includes reading, using a smartphone or computer, watching TV, and playing video games.  ¨ Dont return to sports or other activities that could result in another head injury.  Follow-up care  Follow up with your healthcare provider, or as directed. If imaging tests were done, they will be reviewed by a doctor. You will be told the results and any new findings that may affect your care.  When to seek medical advice  Call your healthcare provider right away if any of these occur:  · Pain doesnt get better or worsens  · New or increased swelling or bruising  · Fever of 100.4°F (38°C) or higher, or as directed by your provider  · Increased redness, warmth, drainage, or bleeding from the injured area  · Fluid drainage or bleeding from the nose or ears  · Any depression or bony abnormality in the injured area  Date Last Reviewed: 9/26/2015  © 7990-8072 [employer]. [address] [place] [address]. All rights reserved. This information is not intended as a substitute for professional medical care. Always follow your healthcare professional's instructions.        Facial Contusion  A contusion is another word for a bruise. It happens when small blood vessels break open and leak blood into the nearby area. A facial contusion can result from a bump, hit, or fall. This may happen during sports or an accident. Symptoms of a contusion often include changes in skin color (bruising), swelling, and pain.   The swelling from the contusion should decrease in a few days. Bruising and pain may take several weeks to go away.   Home care  · If you have been prescribed medicines for pain, take them as directed.  · To help reduce swelling and pain, wrap a cold pack or bag of frozen peas in a thin towel. Put it on the injured area for up to 20 minutes. Do this a few times a day until  the swelling goes down.   · If you have scrapes or cuts on your face requiring stiches or other closures, care for them as directed.  · For the next 24 hours (or longer if instructed):  ¨ Dont drink alcohol, or use sedatives or medicines that make you sleepy.  ¨ Dont drive or operate machinery.  ¨ Avoid doing anything strenuous. Dont lift or strain.  ¨ Do not return to sports or other activity that could result in another head injury.  Note about concussion  Because the injury was to your head, it is possible that a concussion (mild brain injury) could result. You don't have signs of a concussion at this time. But symptoms can show up later. Be alert for signs and symptoms of a concussion. Seek emergency medical care if any of these develop over the next hours to days:  · Headache  · Nausea or vomiting  · Dizziness  · Sensitivity to light or noise  · Unusual sleepiness or grogginess  · Trouble falling asleep  · Personality changes  · Vision changes  · Memory loss  · Confusion  · Trouble walking or clumsiness  · Loss of consciousness (even for a short time)  · Inability to be awakened   Follow-up care  Follow up with your healthcare provider or our staff as directed.  When to seek medical advice  Call your healthcare provider right away if any of these occur:  · Swelling or pain that gets worse, not better  · New swelling or pain  · Warmth or drainage from the swollen area or from cuts or scrapes  · Fluid drainage or bleeding from the nose or ears  · Fever of 100.4ºF (38ºC) or higher, or as directed by your healthcare provider  Date Last Reviewed: 5/7/2015  © 2488-2138 [employer]. [address] [place] [address], [place] [address]. All rights reserved. This information is not intended as a substitute for professional medical care. Always follow your healthcare professional's instructions.

## 2019-05-16 ENCOUNTER — TELEPHONE (OUTPATIENT)
Dept: SPEECH THERAPY | Facility: HOSPITAL | Age: 69
End: 2019-05-16

## 2019-05-16 ENCOUNTER — OFFICE VISIT (OUTPATIENT)
Dept: OTOLARYNGOLOGY | Facility: CLINIC | Age: 69
End: 2019-05-16
Payer: MEDICARE

## 2019-05-16 ENCOUNTER — CLINICAL SUPPORT (OUTPATIENT)
Dept: SPEECH THERAPY | Facility: HOSPITAL | Age: 69
End: 2019-05-16
Attending: OTOLARYNGOLOGY
Payer: MEDICARE

## 2019-05-16 VITALS
HEART RATE: 83 BPM | DIASTOLIC BLOOD PRESSURE: 79 MMHG | WEIGHT: 148 LBS | SYSTOLIC BLOOD PRESSURE: 108 MMHG | BODY MASS INDEX: 27.07 KG/M2

## 2019-05-16 DIAGNOSIS — R49.0 DYSPHONIA: Primary | ICD-10-CM

## 2019-05-16 DIAGNOSIS — J38.4 PRE-NODULAR EDEMA OF THE VOCAL FOLDS: ICD-10-CM

## 2019-05-16 DIAGNOSIS — R49.0 DYSPHONIA: ICD-10-CM

## 2019-05-16 DIAGNOSIS — J38.2 VOCAL FOLD NODULES: Primary | ICD-10-CM

## 2019-05-16 DIAGNOSIS — J38.4 PRE-NODULAR EDEMA OF THE VOCAL FOLDS: Primary | ICD-10-CM

## 2019-05-16 DIAGNOSIS — J38.5 LARYNGEAL SPASM: ICD-10-CM

## 2019-05-16 DIAGNOSIS — R49.0 HOARSENESS: ICD-10-CM

## 2019-05-16 PROCEDURE — 31579 PR LARYNGOSCOPY, FLEX/RIGID TELESCOPIC, W/STROBOSCOPY: ICD-10-PCS | Mod: HCNC,S$GLB,, | Performed by: OTOLARYNGOLOGY

## 2019-05-16 PROCEDURE — 99213 OFFICE O/P EST LOW 20 MIN: CPT | Mod: 25,HCNC,S$GLB, | Performed by: OTOLARYNGOLOGY

## 2019-05-16 PROCEDURE — 31579 LARYNGOSCOPY TELESCOPIC: CPT | Mod: HCNC,S$GLB,, | Performed by: OTOLARYNGOLOGY

## 2019-05-16 PROCEDURE — 99999 PR PBB SHADOW E&M-EST. PATIENT-LVL III: CPT | Mod: PBBFAC,HCNC,, | Performed by: OTOLARYNGOLOGY

## 2019-05-16 PROCEDURE — 92524 BEHAVRAL QUALIT ANALYS VOICE: CPT | Mod: GN,HCNC | Performed by: SPEECH-LANGUAGE PATHOLOGIST

## 2019-05-16 PROCEDURE — 99213 PR OFFICE/OUTPT VISIT, EST, LEVL III, 20-29 MIN: ICD-10-PCS | Mod: 25,HCNC,S$GLB, | Performed by: OTOLARYNGOLOGY

## 2019-05-16 PROCEDURE — 1101F PT FALLS ASSESS-DOCD LE1/YR: CPT | Mod: HCNC,CPTII,S$GLB, | Performed by: OTOLARYNGOLOGY

## 2019-05-16 PROCEDURE — 1101F PR PT FALLS ASSESS DOC 0-1 FALLS W/OUT INJ PAST YR: ICD-10-PCS | Mod: HCNC,CPTII,S$GLB, | Performed by: OTOLARYNGOLOGY

## 2019-05-16 PROCEDURE — 99999 PR PBB SHADOW E&M-EST. PATIENT-LVL III: ICD-10-PCS | Mod: PBBFAC,HCNC,, | Performed by: OTOLARYNGOLOGY

## 2019-05-16 NOTE — PROGRESS NOTES
"[facility]  Department of Otorhinolaryngology and Communication Sciences    Concha Hess is a 68 y.o. female who [facility] the [facility] for consultation at the kind request of Dr. Larios for further management of dysphonia.     She complains of hoarse voice. Roughness, instability, strain. Onset was sudden. Duration is about 2 months. Time course is intermittent. Tends to be worse when she is sick (sinusitis/bronchitis). Symptoms are stable. Exacerbating factors include voice use. Alleviating factors include voice rest. Associated symptoms include increase phlegm.      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 Uses the breo inhaler and a rescue inhaler. C/O constant postnasal drip. She snorts often, after which she immediately coughs out yello/greenish sputum. Suffers from recurrent sinusitis which often deteriorates into bronchitis--at least 4 times last year. Has not met with an ENT regarding her sinuses but would like to. Some outside MD imaged her sinuses in February and they were "full of fluid." Takes an OTC antihistamine and Flonase daily. Recently slipped at home and hit the left side of her face--got a black eye which has resolved. Has had some mild tenderness in the left face/cheek region since then.    Reports she has acid reflux and takes nexium daily. Seeing GI (Dr. Lagunas at ). Takes 20 mg omeprazole daily.    Voice Handicap Index-10 (VHI-10) score is 13.   Reflux Symptom Index (RSI) score is 10.   Eating Assessment Tool-10 (EAT-10) score is 0.   Dyspnea Index (DI) score is 0.  Cough Severity Index (CSI) score is 0.    Past Medical History  She has a past medical history of Acid reflux, Chronic lung disease, COPD (chronic obstructive pulmonary disease), Depression, Gastric ulcer, Hyperlipidemia, Paralysis of diaphragm, and PONV (postoperative nausea and vomiting).    Past Surgical History  She has a past surgical history that includes  section; Breast " surgery; Appendectomy; Cosmetic surgery; and Hysteroscopy (2017).    Family History  Her family history includes Cancer in her mother; Colon cancer in her sister; Heart disease in her father.    Social History  She reports that she has quit smoking. Her smoking use included cigarettes. She has a 29.00 pack-year smoking history. She has never used smokeless tobacco. She reports that she drinks alcohol. She reports that she does not use drugs.    Allergies  She is allergic to dilaudid [hydromorphone] and morphine.    Medications  She has a current medication list which includes the following prescription(s): albuterol, albuterol-ipratropium, breo ellipta, cetirizine, clorazepate, escitalopram oxalate, esomeprazole, estradiol, fluticasone propionate, fluticasone furoate-vilanterol, methocarbamol, multivit-min/iron/folic/lutein, ondansetron, prednisone, promethazine-codeine 6.25-10 mg/5 ml, and rosuvastatin.    Review of Systems   Constitutional: Negative for fever.   HENT: Negative for sore throat.    Eyes: Positive for visual disturbance.   Respiratory: Negative for wheezing.    Cardiovascular: Negative for chest pain.   Gastrointestinal: Negative for nausea.   Musculoskeletal: Negative for arthralgias.   Skin: Negative for rash.   Neurological: Negative for tremors.   Hematological: Does not bruise/bleed easily.   Psychiatric/Behavioral: The patient is not nervous/anxious.           Objective:     /79 (Patient Position: Sitting)   Pulse 83   Wt 67.1 kg (148 lb)   BMI 27.07 kg/m²      Physical Exam    Constitutional: comfortable, well dressed  Psychiatric: appropriate affect  Respiratory: comfortably breathing, symmetric chest rise, no stridor  Voice: mild variable roughness/strain  Cardiovascular: upper extremities non-edematous  Lymphatic: no cervical lymphadenopathy  Neurologic: alert and oriented to time, place, person, and situation; cranial nerves 3-12 grossly intact  Head: normocephalic  Eyes:  conjunctivae and sclerae clear  Ears: normal pinnae, normal external auditory canals, tympanic membranes intact  Nose: mucosa pink and noncongested, no masses, no mucopurulence, no polyps  Oral cavity / oropharynx: no mucosal lesions  Neck: soft, full range of motion, laryngotracheal complex palpable with appropriate landmarks, larynx elevates on swallowing  Indirect laryngoscopy: limited due to gag    Procedure  Rigid Laryngeal Videostroboscopy (56087): Laryngeal videostroboscopy is indicated to assess the laryngeal vibratory biomechanics and vocal fold oscillation, which cannot be assessed with a plain light examination. This was carried out with a 70 degree endoscope. After verbal consent was obtained, the patient was positioned and the tongue was gently secured with a gauze sponge. The endoscope was passed transorally and positioned to image the larynx and hypopharynx in detail. The following features were examined: laryngeal and hypopharyngeal masses; vocal fold range and symmetry of motion; laryngeal mucosal edema, erythema, inflammation, and hydration; salivary pooling; and gross laryngeal sensation. During phonation, the vocal folds were assessed for glottal closure; mucosal wave; vocal fold lesions; vibratory periodicity, amplitude, and phase symmetry; and vertical height match. The equipment was removed. The patient tolerated the procedure well without complication. All findings were normal except:  - mild prenodular edema f the freed edge of each vocal fold with adjacent erythema/edema  - slightly non-linear closure pattern with very mild asymmetry of phase/amplitude of mucosal wave      Assessment:     Concha Hess is a 68 y.o. 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    She has a history of recurrent acute sinusitis with a report of some degree of radiographic opacification.      Plan:        I had a discussion with the patient regarding her condition and the further workup and management options.      SLP voice evaluation and subsequent therapy sessions are medically necessary for restoration of laryngeal function. We will arrange for this to occur [facility] at the [facility] in the coming weeks. I counseled her that if her cough symptoms persist, her voice may continue to be variable. She will follow up with me in about 3 month(s) to reassess her voice symptoms.    I recommended continued follow up with her pulmonologist and gastroenterologist. She may wish to speak with her gastroenterologist about whether she may benefit from empiric increase in her acid suppression in the event reflux may be playing a role in her symptoms.    At her request, we will arrange for her to have a definitive sinus assessment by Dr. Gama. Depending on her progress and Dr. Gama's input, she may benefit from definitive allergy/immunology evaluation.     All questions were answered, and the patient is in agreement with the above.     Shakir Rudolph M.D.  [facility]  Department of Otorhinolaryngology and Communication Sciences

## 2019-05-16 NOTE — PATIENT INSTRUCTIONS
Follow up with Dr. Gama to discuss your sinus symptoms  Continue to see your GI doctor to discuss whether you should incerase your acid control  Consider meeting with the allergist  Call with questions             BENIGN VOCAL FOLD LESIONS     What are benign vocal fold lesions?    Benign vocal fold lesions are non-cancerous growths that may or may not affect voice quality.     Nodules and polyps are common growths that develop at the middle of the vocal folds. Mature nodules are similar to calluses within the vocal fold tissues, usually on both sides. Polyps tend to be more fluid-filled than nodules and may have visible blood vessels feeding into them. Polyps can be on one or both sides. Typically, symptoms for both lesions include hoarseness, effortful voice, and rapid vocal fatigue.    Both nodules and polyps are most commonly caused by vocal fold trauma during voice use (talking or singing). The middle of the vocal folds receives the greatest impact during phonation, which is why nodules and polyps are most likely to develop there.  Smoking, alcohol use, caffeine intake, drying medications, allergies, exposure to chemicals, and reflux may also increase the likelihood that nodules or polyps will develop.    Cysts in the vocal folds are fluid-filled sacs surrounded by a layer of skin. A cyst typically forms in the middle of one vocal cord and causes reactive swelling on the other. Cysts are also likely caused by the forceful contact of the vocal folds.     How are they treated?    For nodules, voice therapy is the first line of treatment. Since nodules are commonly caused by trauma to the vocal folds, voice therapy works to reduce the amount of high-impact contact of the vocal folds, as well as to improve overall voice hygiene and maintenance. In some cases, voice therapy improves voice quality--but does not resolve the problem completely. In these cases, patients may discuss the possibility of microsurgery  with their laryngologist.    Like with nodules, voice therapy may be the first line of treatment for polyps. However, in some cases, surgical removal of the polyp is recommended first, followed by a course of voice therapy to ensure continued healthy, efficient voice use.    Cysts, unlike other types of benign lesions, do not go away with voice therapy alone. However, voice therapy is still a part of the treatment for 1) reduction of vocal fold swelling which will likely improve symptoms, and 2) differential diagnosis: if a lesion goes away with voice therapy, it was most likely not a cyst; if it does go away, it may be a cyst, and surgery may be appropriate.           WHAT TO EXPECT FROM VOICE THERAPY    Purpose  The purpose of voice therapy is to help you find a better, more efficient way to use your voice or to reduce symptoms such as coughing, throat clearing, or difficulty breathing.  Depending on your symptoms, you may learn how to produce clearer voice quality, how to reduce fatigue or pain associated with speaking, how to take care of your voice, and how to eliminate chronic coughing or throat clearing.      Process: Evaluation  First, you may go through some initial testing.  In most cases, a videostroboscopy will be performed in order to allow your speech pathologist and your physician to look at your vocal cords to aid in deciding if you would benefit from voice therapy.  Next, you may work with the speech pathologist to assess the current capabilities of your voice.  Following evaluation, your speech pathologist will design a therapeutic plan to improve your voice as well as other symptoms that may bother you.  At the time of evaluation, your speech pathologist may provide you with exercises to try at home.      Process: Therapy  Most patients benefit from 2-8 sessions over 1-3 months.  Voice therapy involves changing the behavior of your vocal cords and speaking habits, so it is very important that you  attend your appointments and do home practices as instructed by your speech pathologist.  Home practice and participation in therapy are critical to meeting your desired voice goals, so of course, we are able to work with you to schedule appointments that are convenient for you.

## 2019-05-16 NOTE — PROGRESS NOTES
Referring provider: Dr. John Larios  Reason for visit:  Behavioral and qualitative analysis of voice and resonance (CPT 28318)    Subjective / History    Concha Hess is a 68 y.o. female referred for voice evaluation (CPT 49393) by Dr. Larios.  She presents with complaints of hoarseness which began a few months ago.  The patient also endorses: voice worse in afternoon/evening and changes in modal pitch.  She initially noticed the changes after [place]/a trip to [place], but has experienced some improvement and another acute worsening without specific cause that she can identify.  She does use inhalers and has COPD.  She does not currently work outside the home but is frequently spending time with her 5 grandchildren.  She is especially bothered by significant PND and chronic coughing/throat clearing as a result.   Swallowing: no c/o   Smokin packs/day   Reflux: yes    Stroboscopy findings (per Dr. Rudolph on 19)  - mild prenodular edema f the freed edge of each vocal fold with adjacent erythema/edema  - slightly non-linear closure pattern with very mild asymmetry of phase/amplitude of mucosal wave    Past Medical History:   Diagnosis Date    Acid reflux     Chronic lung disease     COPD (chronic obstructive pulmonary disease)     Depression     Gastric ulcer     Hyperlipidemia     Paralysis of diaphragm     left    PONV (postoperative nausea and vomiting)      Current Outpatient Medications on File Prior to Visit   Medication Sig Dispense Refill    albuterol (VENTOLIN HFA) 90 mcg/actuation inhaler Inhale 2 puffs into the lungs every 4 (four) hours as needed for Wheezing or Shortness of Breath. Rescue 18 g 5    albuterol-ipratropium (DUO-NEB) 2.5 mg-0.5 mg/3 mL nebulizer solution Take 3 mLs by nebulization every 6 (six) hours as needed for Wheezing or Shortness of Breath. Rescue 150 mL 5    BREO ELLIPTA 100-25 mcg/dose diskus inhaler Inhale 1 puff into the lungs once daily. 60 each 5     "cetirizine 10 mg chewable tablet Take 10 mg by mouth once daily.      clorazepate (TRANXENE) 7.5 MG Tab TAKE 1 TABLET BY MOUTH TWICE DAILY 60 tablet 3    escitalopram oxalate (LEXAPRO) 10 MG tablet Take 1 tablet (10 mg total) by mouth once daily. 90 tablet 3    esomeprazole (NEXIUM) 20 MG capsule Take 20 mg by mouth before breakfast.      estradiol (ESTRACE) 0.01 % (0.1 mg/gram) vaginal cream Use 0.5 gram of estrogen cream in vagina nightly x 2 weeks, then twice a week thereafter 42.5 g 3    fluticasone (FLONASE) 50 mcg/actuation nasal spray 2 sprays (100 mcg total) by Each Nare route once daily. 1 Bottle 0    fluticasone-vilanterol (BREO ELLIPTA) 200-25 mcg/dose DsDv diskus inhaler Inhale 1 puff into the lungs once daily. Controller 1 each 5    methocarbamol (ROBAXIN) 500 MG Tab Take 2 tablets (1,000 mg total) by mouth 4 (four) times daily. for 7 days 56 tablet 0    MULTIVITS-MIN/IRON/FA/LUTEIN (ULTIMATE WOMEN'S COMPLETE 50+ ORAL) Take by mouth.      ondansetron (ZOFRAN-ODT) 4 MG TbDL Take 1 tablet (4 mg total) by mouth every 6 (six) hours as needed (nausea). 20 tablet 1    predniSONE (DELTASONE) 10 MG tablet 2 tabs  X 7 days then 2tabs X tabs then 7 days 24 tablet 0    promethazine-codeine 6.25-10 mg/5 ml (PHENERGAN WITH CODEINE) 6.25-10 mg/5 mL syrup Take 5 mLs by mouth every 6 (six) hours as needed for Cough. 120 mL 0    rosuvastatin (CRESTOR) 5 MG tablet Take 1 tablet (5 mg total) by mouth once daily. 90 tablet 1     No current facility-administered medications on file prior to visit.        Objective    Perceptual/behavioral assessment  -CAPE-V Overall Score: 32  -Quality: rough, strained, intermittent breaks  -Volume: appropriate for age and gender  -Pitch: appropriate for age and gender identity/ slightly low  -Flexibility: diminished  -Habitual respiratory pattern: chest/clavicular    Acoustic/aerodynamic assessment  Multi-Dimensional Voice Program (MDVP) Analysis (sustained "ah")   Baseline " "Gender-matched norms (F)   Average fundamental frequency (Fo) 184.478 Hz Norm/SD: 243.97 / 27.46   Relative average perturbation 0.754% Norm/SD: 0.378 / 0.21   Shimmer percent 5.315% Norm/SD: 1.997 / 0.79   Noise to harmonic ratio 0.132 Norm/SD: 0.112 / 0.01   Voice turbulence index 0.066 Norm/SD: 0.046 / 0.012     Patient self-perception scales  -Voice Handicap Index-10 (completed to assess self-perceived handicap associated with dysphonia; >11 considered abnormal): 13  -Reflux Symptom Index (completed to assess the possible presence and/or severity of LPR symptoms and any relationship between this condition and the pt's dysphonia; score of ~15 may indicate LPR): 10    Education / Stimulability Trials   Discussed importance of vocal hygiene including: hydration, conservation and reducing throat clearing, coughing, other phonotraumatic behaviors. Patient was stimulable for improved voice using SOVT exercises.  Instructed on straw phonation combined with confidential voice to help her establish a "temporary" more stable vocal tone with improved forward focus and reduced voice breaks/roughness.  She was able to identify this voice was easier and more stable for her to use.      Functional goals  Length Status Goal   Long term Initiated Patient will implement and adhere to vocal hygiene protocols on a daily basis, including the elimination of phonotraumatic behaviors.    Long term Initiated Patient and clinician will facilitate changes in vocal function in order to restore functional use of voice for daily occupational, social, and emotional demands.    Long term Initiated Patient will re-establish phonation with adequate balance of airflow and resonance with decreased muscle tension.    Long term Initiated Patient will improve coordination of respiration and phonation for efficient vocal production at a conversational level.    Short term Initiated Patient will coordinate vocal subsystems in hierarchical speech tasks " by producing sound in an efficient manner yielding improved or normal voice quality and vocal endurance in the presence of existing laryngeal disorder with 90% accuracy independently.   Short term Initiated Patient will complete SOVT exercises and/or resonant-focused exercises 3-5x daily to strengthen and balance the intrinsic laryngeal musculature and maximize glottic closure without medial hyperfunction.   Short term Initiated Patient will discriminate between easy and strained phonation with 80% accuracy.    Short term Initiated Patient will increase awareness for voice conservations behaviors by following the 50/10 rule and reduce voice use in competing noise environments.    Short term Initiated Patient will demonstrate the ability to increase awareness of voicing behavior through self-monitoring to facilitate generalization in functional speaking situations with 80% accuracy.        Assessment     Concha Hess presents with moderate dysphonia.  The primary encounter diagnosis was Vocal fold nodules. A diagnosis of Dysphonia was also pertinent to this visit.  Prognosis for continued improvement is good.     Recommendations / POC    Recommend 2-4 sessions of voice/speech therapy over 4-6 weeks with a speech-language pathologist to optimize glottal postures for improved vocal function, vocal efficiency, and ease of phonation.  She should continue the exercises as discussed in session and should contact me with any further questions.

## 2019-06-07 ENCOUNTER — TELEPHONE (OUTPATIENT)
Dept: UROGYNECOLOGY | Facility: CLINIC | Age: 69
End: 2019-06-07

## 2019-06-07 NOTE — TELEPHONE ENCOUNTER
Harshad Berry,    Can you please contact pt and help her schedule an appointment with Dr. Parra first available.    Thank you,  Ortiz

## 2019-06-07 NOTE — TELEPHONE ENCOUNTER
----- Message from Osiris Pritchett sent at 6/7/2019 11:35 AM CDT -----  Contact: pt   Name of Who is Calling: LICO ASENCIO [[id number]]       What is the request in detail: Patient is requesting a call from staff in regards to scheduling an appointment for her bladder issues she would like to be seen as soon as possible  ..... Please contact to further discuss and advise.     Can the clinic reply by [facility]: yes     What Number to Call Back if not in [facility]: [phone]

## 2019-06-10 ENCOUNTER — TELEPHONE (OUTPATIENT)
Dept: UROGYNECOLOGY | Facility: CLINIC | Age: 69
End: 2019-06-10

## 2019-06-10 NOTE — TELEPHONE ENCOUNTER
----- Message from Nadeen Yen sent at 6/10/2019  1:51 PM CDT -----  Contact: Patient   Patient returned missed call and would like a call back. The patient can be reached at  [phone].

## 2019-06-10 NOTE — TELEPHONE ENCOUNTER
Harshad Berry,     Can you please contact pt and help her schedule a consult appointment with Dr. Parra.      Thanks,  Ortiz

## 2019-06-11 ENCOUNTER — TELEPHONE (OUTPATIENT)
Dept: UROGYNECOLOGY | Facility: CLINIC | Age: 69
End: 2019-06-11

## 2019-06-11 NOTE — TELEPHONE ENCOUNTER
Attempted to contact pt regarding her scheduled appointment on 6/12/2019, pt did not answer, LM to call office.

## 2019-06-12 ENCOUNTER — OFFICE VISIT (OUTPATIENT)
Dept: UROGYNECOLOGY | Facility: CLINIC | Age: 69
End: 2019-06-12
Payer: MEDICARE

## 2019-06-12 VITALS
HEIGHT: 62 IN | SYSTOLIC BLOOD PRESSURE: 110 MMHG | DIASTOLIC BLOOD PRESSURE: 80 MMHG | WEIGHT: 144.81 LBS | BODY MASS INDEX: 26.65 KG/M2

## 2019-06-12 DIAGNOSIS — N95.2 VAGINAL ATROPHY: ICD-10-CM

## 2019-06-12 DIAGNOSIS — R35.1 NOCTURIA: ICD-10-CM

## 2019-06-12 DIAGNOSIS — F41.9 ANXIETY: ICD-10-CM

## 2019-06-12 DIAGNOSIS — R39.15 URINARY URGENCY: ICD-10-CM

## 2019-06-12 DIAGNOSIS — N36.2 URETHRAL CARUNCLE: ICD-10-CM

## 2019-06-12 DIAGNOSIS — R10.2 PELVIC PRESSURE IN FEMALE: ICD-10-CM

## 2019-06-12 DIAGNOSIS — N39.41 URINARY INCONTINENCE, URGE: Primary | ICD-10-CM

## 2019-06-12 DIAGNOSIS — F32.A DEPRESSION, UNSPECIFIED DEPRESSION TYPE: ICD-10-CM

## 2019-06-12 DIAGNOSIS — N39.46 MIXED STRESS AND URGE URINARY INCONTINENCE: ICD-10-CM

## 2019-06-12 PROCEDURE — 1101F PT FALLS ASSESS-DOCD LE1/YR: CPT | Mod: HCNC,CPTII,S$GLB, | Performed by: OBSTETRICS & GYNECOLOGY

## 2019-06-12 PROCEDURE — 1101F PR PT FALLS ASSESS DOC 0-1 FALLS W/OUT INJ PAST YR: ICD-10-PCS | Mod: HCNC,CPTII,S$GLB, | Performed by: OBSTETRICS & GYNECOLOGY

## 2019-06-12 PROCEDURE — 87077 CULTURE AEROBIC IDENTIFY: CPT | Mod: HCNC

## 2019-06-12 PROCEDURE — 99999 PR PBB SHADOW E&M-EST. PATIENT-LVL III: CPT | Mod: PBBFAC,HCNC,, | Performed by: OBSTETRICS & GYNECOLOGY

## 2019-06-12 PROCEDURE — 99214 PR OFFICE/OUTPT VISIT, EST, LEVL IV, 30-39 MIN: ICD-10-PCS | Mod: HCNC,S$GLB,, | Performed by: OBSTETRICS & GYNECOLOGY

## 2019-06-12 PROCEDURE — 99999 PR PBB SHADOW E&M-EST. PATIENT-LVL III: ICD-10-PCS | Mod: PBBFAC,HCNC,, | Performed by: OBSTETRICS & GYNECOLOGY

## 2019-06-12 PROCEDURE — 87088 URINE BACTERIA CULTURE: CPT | Mod: HCNC

## 2019-06-12 PROCEDURE — 87186 SC STD MICRODIL/AGAR DIL: CPT | Mod: HCNC

## 2019-06-12 PROCEDURE — 99214 OFFICE O/P EST MOD 30 MIN: CPT | Mod: HCNC,S$GLB,, | Performed by: OBSTETRICS & GYNECOLOGY

## 2019-06-12 PROCEDURE — 87086 URINE CULTURE/COLONY COUNT: CPT | Mod: HCNC

## 2019-06-12 RX ORDER — ESTRADIOL 0.1 MG/G
CREAM VAGINAL
Qty: 42.5 G | Refills: 3 | Status: SHIPPED | OUTPATIENT
Start: 2019-06-12 | End: 2020-02-05

## 2019-06-12 RX ORDER — ESTRADIOL 0.1 MG/G
CREAM VAGINAL
Qty: 42.5 G | Refills: 11 | Status: SHIPPED | OUTPATIENT
Start: 2019-06-12 | End: 2019-07-05

## 2019-06-12 RX ORDER — AMOXICILLIN 875 MG/1
TABLET, FILM COATED ORAL
COMMUNITY
Start: 2019-05-16 | End: 2019-06-12

## 2019-06-12 NOTE — PATIENT INSTRUCTIONS
1)  Urinary urgency with pelvic pressure, mild stress urinary incontinence:  --good pelvic support on exam; suspect bladder contractions/OAB-type issues  --urine C&S  --pelvic US to assess other sources of pelvic pressure  --Empty bladder every 3 hours.  Empty well: wait a minute, lean forward on toilet.    --Avoid dietary irritants (see sheet).  Keep diary x 3-5 days to determine your irritants.  --start pelvic floor PT. Call in a few days to make appt:  Heidi Hidalgo ([facility]): (p) [phone]. (f) [phone]. Established patients call:  [phone].  --URGE: didn't tolerate oxybutynin.  Start mirabegron 50 mg daily. Takes 2-4 weeks to see if will have effect. Side effects discussed. For dry mouth: get sour, sugar free lozenge or gum.  Reviewed fiber to prevent constipation.  --treat vaginal atrophy  --STRESS:  Pessary vs. Sling.     2)  Nocturia (nighttime urination): stop fluids 2 hours before bed/no water by bed.  If have leg swelling:  Elevate feet above chest x 1 hour before bed to get excess fluid off.  Can also use support hose (knee highs).      3)  Vaginal atrophy (dryness), urethral caruncle:  Use 0.5 gram of estrogen cream in vagina nightly x 2 weeks, then twice a week thereafter.  Use small amount of cream with finger around vaginal opening/inner lips.  --this may help bladder urgency/frequency and keep lichen from occuring    4)  Well-woman:  Pap test: 2/2017 -negative.  History of abnormal paps: No.  History of STIs:  No  Mammogram: Date of last: 7/2018.  Result: Normal.  Colonoscopy: Date of last: 2015.  Result:  Negative per patient.  Repeat due:  2020.    DEXA:  Date of last: 2016.  Result: osteopenia.  Ordered 2018--didn't schedule yet.   Please schedule.    5)  RTC 3-4 months.

## 2019-06-12 NOTE — PROGRESS NOTES
Urogyn follow up  06/16/2019    [facility]YN [place] [address]   [address] [address]    Concha Hess  [id number]  1950      Concha Hess is a 68 y.o. here for a urogyn follow up.    HPI: 65 yo P3 F who presents with urinary issues x6 months. Describes suprapubic pressure with urination that starts daily around the afternoon time and continues through the night. Reports intermittent stream that requires her to sit on the toilet for awhile, occurs 4-5 times before she feels like she is done urinating. Uses crede maneuver.     1)  UI:  (+) DOLORES < (+) UUI  X 6months.  (+) pads:1/day, usually minimum wetness. No pads at night. Daytime frequency: Q 6 hours.  Nocturia: Yes: 2/night.   (--) dysuria,  (--) hematuria,  (--) frequent UTIs.  (+) complete bladder emptying but only after sitting and having several streams.     2)  POP:  absent. Symptoms:n/a.  (--) vaginal bleeding. (--) vaginal discharge. (--) sexually active.  (--) dyspareunia.  (--)  Vaginal dryness.  (--) vaginal estrogen use.     3)  BM:  (--) constipation/straining. Daily BM, usually pass easily, has to strain occasionally. Not on a regular bowel regimen. Takes stool softeners rarely.  (--) chronic diarrhea. (--) hematochezia.  (--) fecal incontinence.  (--) fecal smearing/urgency.  (+) complete evacuation.     --initial exam:  PELVIC:    External genitalia:  Normal Bartholins, Skenes and labia bilaterally.  Mild agglutination of labia minor to majora, concerning of lichen process.    Urethra:  + caruncle, diverticulum or masses.  (+) hypermobility.    Vagina:  Atrophy (+) , no bladder masses or tender, no discharge.    Cervix:  normal appearance  Uterus: normal size, contour, position, consistency, mobility, non-tender  Adnexa: Not palpable.      Past Medical History  Past Medical History:   Diagnosis Date    Acid reflux     Chronic lung disease     COPD (chronic obstructive pulmonary disease)      Depression     Gastric ulcer     Hyperlipidemia     Paralysis of diaphragm     left    PONV (postoperative nausea and vomiting)         Past Surgical History  Past Surgical History:   Procedure Laterality Date    APPENDECTOMY      BREAST SURGERY      AUGMENTATION     SECTION      COSMETIC SURGERY      FACE LIFT breast reduction    HYSTEROSCOPY  2017    [id number] N/A 3/21/2017    Performed by Alexandra Thompsno MD at [facility] OR   [facility]    Hysterectomy: No    Past Ob History     x 2.  C/s x 1.    Largest infant weight: 9# 5 oz  possible FAVD? unsure. yes episiotomy.  Denies 3 or 4 degree lac.    Gynecologic History  LMP: No LMP recorded. Patient is postmenopausal.  Age of menarche: 13  Age of menopause: 49  Menstrual history: regular, normal flow  Pap test: 2017 -negative.  History of abnormal paps: No.  History of STIs:  No  Mammogram: Date of last: 2018.  Result: Normal.  Colonoscopy: Date of last: .  Result:  Negative per patient.  Repeat due:  .    DEXA:  Date of last: .  Result: osteopenia.  Ordered 2018--didn't schedule yet.        Issues include:  Patient Active Problem List   Diagnosis    Spondylosis of lumbar region without myelopathy or radiculopathy    Urinary urgency    Mixed stress and urge urinary incontinence    Nocturia    Vaginal atrophy    Urethral caruncle    COPD (chronic obstructive pulmonary disease)    Hypercholesterolemia    Anxiety    Depression    Vitamin D deficiency    Gastroesophageal reflux disease without esophagitis    DDD (degenerative disc disease), cervical    Insomnia    Family history of colon cancer    Dyspnea    Dysphonia    Pre-nodular edema of the vocal folds    Laryngeal spasm    Pelvic pressure in female       History since last visit:  1)  Urinary urgency with pelvic pressure, mild stress urinary incontinence:   --good pelvic support on exam; suspect bladder contractions/OAB-type  issues  --baseline symptoms: (+) pads:1/day, usually minimum wetness. No pads at night. Daytime frequency: Q 6 hours.  Nocturia: Yes: 2/night.    --currently:  Most issue is pressure at night--feels urgency. Nocturia: 8x/PM x few months. Urgency increases with changing position at night. No wetness at night.  Daytime: 1 ppd, min wetness with stress provocations. +DOLORES.    --does drink fluid by bed; keeps water by bed; no LE swelling  --URGE: could not tolerate oxybutynin due to xerostomia.   --not treating vaginal atrophy    2)  Nocturia (nighttime urination):  Most issue is pressure at night--feels urgency. Nocturia: 8x/PM x few months. Urgency increases with changing position at night.   Daytime: 1 ppd, min wetness with stress provocations.    --does drink fluid by bed; keeps water by bed; no LE swelling    3)  Vaginal atrophy (dryness), urethral caruncle: using estrogen cream rarely.     --no POP s/sx  --BMs normal    Medications:    Current Outpatient Medications:     albuterol (VENTOLIN HFA) 90 mcg/actuation inhaler, Inhale 2 puffs into the lungs every 4 (four) hours as needed for Wheezing or Shortness of Breath. Rescue, Disp: 18 g, Rfl: 5    albuterol-ipratropium (DUO-NEB) 2.5 mg-0.5 mg/3 mL nebulizer solution, Take 3 mLs by nebulization every 6 (six) hours as needed for Wheezing or Shortness of Breath. Rescue, Disp: 150 mL, Rfl: 5    BREO ELLIPTA 100-25 mcg/dose diskus inhaler, Inhale 1 puff into the lungs once daily., Disp: 60 each, Rfl: 5    clorazepate (TRANXENE) 7.5 MG Tab, TAKE 1 TABLET BY MOUTH TWICE DAILY, Disp: 60 tablet, Rfl: 3    escitalopram oxalate (LEXAPRO) 10 MG tablet, Take 1 tablet (10 mg total) by mouth once daily., Disp: 90 tablet, Rfl: 3    esomeprazole (NEXIUM) 20 MG capsule, Take 20 mg by mouth before breakfast., Disp: , Rfl:     estradiol (ESTRACE) 0.01 % (0.1 mg/gram) vaginal cream, Use 0.5 gram of estrogen cream in vagina nightly x 2 weeks, then twice a week thereafter, Disp:  "42.5 g, Rfl: 3    fluticasone (FLONASE) 50 mcg/actuation nasal spray, 2 sprays (100 mcg total) by Each Nare route once daily., Disp: 1 Bottle, Rfl: 0    fluticasone-vilanterol (BREO ELLIPTA) 200-25 mcg/dose DsDv diskus inhaler, Inhale 1 puff into the lungs once daily. Controller, Disp: 1 each, Rfl: 5    MULTIVITS-MIN/IRON/FA/LUTEIN (ULTIMATE WOMEN'S COMPLETE 50+ ORAL), Take by mouth., Disp: , Rfl:     ondansetron (ZOFRAN-ODT) 4 MG TbDL, Take 1 tablet (4 mg total) by mouth every 6 (six) hours as needed (nausea)., Disp: 20 tablet, Rfl: 1    cetirizine 10 mg chewable tablet, Take 10 mg by mouth once daily., Disp: , Rfl:     estradiol (ESTRACE) 0.01 % (0.1 mg/gram) vaginal cream, 0.5 g nightly x 2 weeks, then 2x/week thereafter., Disp: 42.5 g, Rfl: 11    mirabegron (MYRBETRIQ) 50 mg Tb24, Take 1 tablet (50 mg total) by mouth once daily., Disp: 30 tablet, Rfl: 11    ROS:  As per HPI.      Exam  /80 (BP Location: Right arm, Patient Position: Sitting, BP Method: Medium (Manual))   Ht 5' 2" (1.575 m)   Wt 65.7 kg (144 lb 13.5 oz)   BMI 26.49 kg/m²   General: alert and oriented, no acute distress  Respiratory: normal respiratory effort  Abd: soft, non-tender, non-distended    Pelvic  Ext. Genitalia: normal external genitalia. Normal bartholin's and skenes glands  Vagina: neg atrophy. Normal vaginal mucosa without lesions. No discharge noted.   Non-tender bladder base without palpable mass.  Cervix: no lesions  Uterus:  uterus is normal size, shape, consistency and nontender   Urethra: no masses or tenderness  Urethral meatus: no lesions, caruncle or prolapse.    POP-Q:  Deferred.  No POP with valsalva.     Impression  1. Urinary incontinence, urge  mirabegron (MYRBETRIQ) 50 mg Tb24    Urine culture    estradiol (ESTRACE) 0.01 % (0.1 mg/gram) vaginal cream   2. Vaginal atrophy  estradiol (ESTRACE) 0.01 % (0.1 mg/gram) vaginal cream   3. Urethral caruncle  estradiol (ESTRACE) 0.01 % (0.1 mg/gram) vaginal cream "   4. Pelvic pressure in female  US Pelvis Comp with Transvag NON-OB (xpd   5. Nocturia  Ambulatory consult to Physical Therapy   6. Anxiety     7. Depression, unspecified depression type     8. Mixed stress and urge urinary incontinence     9. Urinary urgency       We reviewed the above issues and discussed options for short-term versus long-term management of her problems.   Plan:   1)  Urinary urgency with pelvic pressure, mild stress urinary incontinence:  --good pelvic support on exam; suspect bladder contractions/OAB-type issues  --urine C&S  --pelvic US to assess other sources of pelvic pressure  --Empty bladder every 3 hours.  Empty well: wait a minute, lean forward on toilet.    --Avoid dietary irritants (see sheet).  Keep diary x 3-5 days to determine your irritants.  --start pelvic floor PT. Call in a few days to make appt:  Heidi Hidalgo ([facility]): (p) [phone]. (f) [phone]. Established patients call:  [phone].  --URGE: didn't tolerate oxybutynin.  Start mirabegron 50 mg daily. Takes 2-4 weeks to see if will have effect. Side effects discussed. For dry mouth: get sour, sugar free lozenge or gum.  Reviewed fiber to prevent constipation.  --treat vaginal atrophy  --STRESS:  Pessary vs. Sling.     2)  Nocturia (nighttime urination): stop fluids 2 hours before bed/no water by bed.  If have leg swelling:  Elevate feet above chest x 1 hour before bed to get excess fluid off.  Can also use support hose (knee highs).      3)  Vaginal atrophy (dryness), urethral caruncle:  Use 0.5 gram of estrogen cream in vagina nightly x 2 weeks, then twice a week thereafter.  Use small amount of cream with finger around vaginal opening/inner lips.  --this may help bladder urgency/frequency and keep lichen from occuring    4)  Well-woman:  Pap test: 2/2017 -negative.  History of abnormal paps: No.  History of STIs:  No  Mammogram: Date of last: 7/2018.  Result: Normal.  Colonoscopy: Date of  last: 2015.  Result:  Negative per patient.  Repeat due:  2020.    DEXA:  Date of last: 2016.  Result: osteopenia.  Ordered 2018--didn't schedule yet.   Please schedule.    5)  RTC 3-4 months.     40 minutes were spent in face to face time with this patient  90 % of this time was spent in counseling and/or coordination of care     Dandy Parra MD  [facility]  Division of Female Pelvic Medicine and Reconstructive Surgery  Department of Obstetrics & Gynecology

## 2019-06-15 LAB — BACTERIA UR CULT: NORMAL

## 2019-06-16 DIAGNOSIS — N39.0 ACUTE UTI: Primary | ICD-10-CM

## 2019-06-16 PROBLEM — R10.2 PELVIC PRESSURE IN FEMALE: Status: ACTIVE | Noted: 2019-06-16

## 2019-06-16 RX ORDER — CIPROFLOXACIN 500 MG/1
500 TABLET ORAL 2 TIMES DAILY
Qty: 6 TABLET | Refills: 0 | Status: SHIPPED | OUTPATIENT
Start: 2019-06-16 | End: 2019-06-19

## 2019-06-17 ENCOUNTER — TELEPHONE (OUTPATIENT)
Dept: UROGYNECOLOGY | Facility: CLINIC | Age: 69
End: 2019-06-17

## 2019-06-17 NOTE — TELEPHONE ENCOUNTER
----- Message from Dandy Parra MD sent at 6/16/2019  7:19 PM CDT -----  Please let patient know urine C&S + UTI.  Rx for cipro sent to her pharmacy--please /start.    If she can start using vaginal estrogen and work on other things we discussed at her last visit, she may have some better control over her frequency of getting these infections.   Thanks!

## 2019-06-17 NOTE — TELEPHONE ENCOUNTER
Left voice message for pt to give the office a call back at [phone]. Called to relay the following :  Please let patient know urine C&S + UTI.  Rx for cipro sent to her pharmacy--please /start.     If she can start using vaginal estrogen and work on other things we discussed at her last visit, she may have some better control over her frequency of getting these infections.   Thanks!

## 2019-06-20 ENCOUNTER — TELEPHONE (OUTPATIENT)
Dept: UROGYNECOLOGY | Facility: CLINIC | Age: 69
End: 2019-06-20

## 2019-06-20 NOTE — TELEPHONE ENCOUNTER
Attempted to contact pt to inform her that her urine culture was positive for UTI and cipro was sent to her pharmacy to  and start, and also if she can start using vaginal estrogen and work on other things discussed at her last visit, she may have some better control over her frequency of getting these infections. Pt did not answer and voice mailbox is full.

## 2019-06-24 ENCOUNTER — TELEPHONE (OUTPATIENT)
Dept: UROGYNECOLOGY | Facility: CLINIC | Age: 69
End: 2019-06-24

## 2019-06-24 NOTE — TELEPHONE ENCOUNTER
----- Message from Dandy Parra MD sent at 6/18/2019  5:33 PM CDT -----  Please let patient know urine C&S + UTI.  Rx for cipro sent to her pharmacy--please /start.    If she can start using vaginal estrogen and work on other things we discussed at her last visit, she may have some better control over her frequency of getting these infections.   Thanks!

## 2019-06-24 NOTE — TELEPHONE ENCOUNTER
Attempted to contact pt to inform her that her urine culture was positive for UTI. Pt did not answer. Lm to call office.

## 2019-06-25 ENCOUNTER — TELEPHONE (OUTPATIENT)
Dept: UROGYNECOLOGY | Facility: CLINIC | Age: 69
End: 2019-06-25

## 2019-06-25 NOTE — TELEPHONE ENCOUNTER
Left voice message for pt to give the office a call back at [phone]. Called to relay the following:    Please let patient know urine C&S + UTI.  Rx for cipro sent to her pharmacy--please /start.    If she can start using vaginal estrogen and work on other things we discussed at her last visit, she may have some better control over her frequency of getting these infections.

## 2019-06-30 ENCOUNTER — HOSPITAL ENCOUNTER (OUTPATIENT)
Facility: HOSPITAL | Age: 69
Discharge: HOME | End: 2019-07-01
Attending: EMERGENCY MEDICINE | Admitting: SURGERY
Payer: MEDICARE

## 2019-06-30 DIAGNOSIS — R06.02 SOB (SHORTNESS OF BREATH): ICD-10-CM

## 2019-06-30 DIAGNOSIS — M89.8X2 PAIN OF LEFT HUMERUS: ICD-10-CM

## 2019-06-30 DIAGNOSIS — M25.512 LEFT SHOULDER PAIN: ICD-10-CM

## 2019-06-30 DIAGNOSIS — S06.0X1A CONCUSSION WITH LOSS OF CONSCIOUSNESS OF 30 MINUTES OR LESS, INITIAL ENCOUNTER: ICD-10-CM

## 2019-06-30 DIAGNOSIS — S06.0X1S CONCUSSION WITH LOSS OF CONSCIOUSNESS OF 30 MINUTES OR LESS, SEQUELA: Primary | ICD-10-CM
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
## 2019-06-30 DIAGNOSIS — M25.522 LEFT ELBOW PAIN: ICD-10-CM

## 2019-06-30 DIAGNOSIS — S06.309A: ICD-10-CM

## 2019-06-30 DIAGNOSIS — S06.9X1A TRAUMATIC BRAIN INJURY, WITH LOSS OF CONSCIOUSNESS OF 30 MINUTES OR LESS, INITIAL ENCOUNTER: ICD-10-CM

## 2019-06-30 PROBLEM — R47.01 APHASIA: Status: ACTIVE | Noted: 2019-06-30

## 2019-06-30 PROBLEM — S06.0XAA CONCUSSION: Status: ACTIVE | Noted: 2019-06-30

## 2019-06-30 LAB
ALBUMIN SERPL BCP-MCNC: 4 G/DL (ref 3.5–5.2)
ALP SERPL-CCNC: 81 U/L (ref 55–135)
ALT SERPL W/O P-5'-P-CCNC: 23 U/L (ref 10–44)
AMPHET+METHAMPHET UR QL: NEGATIVE
ANION GAP SERPL CALC-SCNC: 15 MMOL/L (ref 8–16)
APTT BLDCRRT: <21 SEC (ref 21–32)
AST SERPL-CCNC: 34 U/L (ref 10–40)
BACTERIA #/AREA URNS AUTO: ABNORMAL /HPF
BARBITURATES UR QL SCN>200 NG/ML: NEGATIVE
BASOPHILS # BLD AUTO: 0.09 K/UL (ref 0–0.2)
BASOPHILS NFR BLD: 0.8 % (ref 0–1.9)
BENZODIAZ UR QL SCN>200 NG/ML: NORMAL
BILIRUB SERPL-MCNC: 0.6 MG/DL (ref 0.1–1)
BILIRUB UR QL STRIP: NEGATIVE
BUN SERPL-MCNC: 9 MG/DL (ref 8–23)
BZE UR QL SCN: NEGATIVE
CALCIUM SERPL-MCNC: 9.9 MG/DL (ref 8.7–10.5)
CANNABINOIDS UR QL SCN: NEGATIVE
CHLORIDE SERPL-SCNC: 100 MMOL/L (ref 95–110)
CLARITY UR REFRACT.AUTO: ABNORMAL
CO2 SERPL-SCNC: 19 MMOL/L (ref 23–29)
COLOR UR AUTO: YELLOW
CREAT SERPL-MCNC: 0.8 MG/DL (ref 0.5–1.4)
CREAT UR-MCNC: 38 MG/DL (ref 15–325)
DIFFERENTIAL METHOD: ABNORMAL
EOSINOPHIL # BLD AUTO: 0.3 K/UL (ref 0–0.5)
EOSINOPHIL NFR BLD: 2.4 % (ref 0–8)
ERYTHROCYTE [DISTWIDTH] IN BLOOD BY AUTOMATED COUNT: 13.8 % (ref 11.5–14.5)
EST. GFR  (AFRICAN AMERICAN): >60 ML/MIN/1.73 M^2
EST. GFR  (NON AFRICAN AMERICAN): >60 ML/MIN/1.73 M^2
ETHANOL SERPL-MCNC: <10 MG/DL
GLUCOSE SERPL-MCNC: 131 MG/DL (ref 70–110)
GLUCOSE UR QL STRIP: NEGATIVE
HCT VFR BLD AUTO: 41.1 % (ref 37–48.5)
HGB BLD-MCNC: 13.5 G/DL (ref 12–16)
HGB UR QL STRIP: NEGATIVE
IMM GRANULOCYTES # BLD AUTO: 0.1 K/UL (ref 0–0.04)
IMM GRANULOCYTES NFR BLD AUTO: 0.9 % (ref 0–0.5)
INR PPP: 1 (ref 0.8–1.2)
KETONES UR QL STRIP: ABNORMAL
LEUKOCYTE ESTERASE UR QL STRIP: NEGATIVE
LYMPHOCYTES # BLD AUTO: 4.5 K/UL (ref 1–4.8)
LYMPHOCYTES NFR BLD: 39.7 % (ref 18–48)
MCH RBC QN AUTO: 30.5 PG (ref 27–31)
MCHC RBC AUTO-ENTMCNC: 32.8 G/DL (ref 32–36)
MCV RBC AUTO: 93 FL (ref 82–98)
METHADONE UR QL SCN>300 NG/ML: NEGATIVE
MICROSCOPIC COMMENT: ABNORMAL
MONOCYTES # BLD AUTO: 0.8 K/UL (ref 0.3–1)
MONOCYTES NFR BLD: 7.3 % (ref 4–15)
NEUTROPHILS # BLD AUTO: 5.6 K/UL (ref 1.8–7.7)
NEUTROPHILS NFR BLD: 48.9 % (ref 38–73)
NITRITE UR QL STRIP: POSITIVE
NRBC BLD-RTO: 0 /100 WBC
OPIATES UR QL SCN: NEGATIVE
PCP UR QL SCN>25 NG/ML: NEGATIVE
PH UR STRIP: >8 [PH] (ref 5–8)
PLATELET # BLD AUTO: 412 K/UL (ref 150–350)
PMV BLD AUTO: 9.5 FL (ref 9.2–12.9)
POTASSIUM SERPL-SCNC: 4.6 MMOL/L (ref 3.5–5.1)
PROT SERPL-MCNC: 7.6 G/DL (ref 6–8.4)
PROT UR QL STRIP: NEGATIVE
PROTHROMBIN TIME: 10.1 SEC (ref 9–12.5)
RBC # BLD AUTO: 4.43 M/UL (ref 4–5.4)
RBC #/AREA URNS AUTO: 1 /HPF (ref 0–4)
SODIUM SERPL-SCNC: 134 MMOL/L (ref 136–145)
SP GR UR STRIP: 1.01 (ref 1–1.03)
SQUAMOUS #/AREA URNS AUTO: 1 /HPF
TOXICOLOGY INFORMATION: NORMAL
URN SPEC COLLECT METH UR: ABNORMAL
WBC # BLD AUTO: 11.43 K/UL (ref 3.9–12.7)
WBC #/AREA URNS AUTO: 3 /HPF (ref 0–5)

## 2019-06-30 PROCEDURE — 85730 THROMBOPLASTIN TIME PARTIAL: CPT | Mod: HCNC

## 2019-06-30 PROCEDURE — 25000003 PHARM REV CODE 250: Mod: HCNC | Performed by: STUDENT IN AN ORGANIZED HEALTH CARE EDUCATION/TRAINING PROGRAM

## 2019-06-30 PROCEDURE — 99291 PR CRITICAL CARE, E/M 30-74 MINUTES: ICD-10-PCS | Mod: HCNC,,, | Performed by: EMERGENCY MEDICINE

## 2019-06-30 PROCEDURE — 81001 URINALYSIS AUTO W/SCOPE: CPT | Mod: HCNC

## 2019-06-30 PROCEDURE — 99223 1ST HOSP IP/OBS HIGH 75: CPT | Mod: HCNC,AI,, | Performed by: SURGERY

## 2019-06-30 PROCEDURE — 63600175 PHARM REV CODE 636 W HCPCS: Mod: HCNC | Performed by: EMERGENCY MEDICINE

## 2019-06-30 PROCEDURE — 25000003 PHARM REV CODE 250: Mod: HCNC | Performed by: SURGERY

## 2019-06-30 PROCEDURE — G0378 HOSPITAL OBSERVATION PER HR: HCPCS | Mod: HCNC

## 2019-06-30 PROCEDURE — 96374 THER/PROPH/DIAG INJ IV PUSH: CPT | Mod: HCNC

## 2019-06-30 PROCEDURE — 99291 CRITICAL CARE FIRST HOUR: CPT | Mod: HCNC,,, | Performed by: EMERGENCY MEDICINE

## 2019-06-30 PROCEDURE — 80307 DRUG TEST PRSMV CHEM ANLYZR: CPT | Mod: HCNC

## 2019-06-30 PROCEDURE — 85025 COMPLETE CBC W/AUTO DIFF WBC: CPT | Mod: HCNC

## 2019-06-30 PROCEDURE — 99291 CRITICAL CARE FIRST HOUR: CPT | Mod: 25,HCNC

## 2019-06-30 PROCEDURE — 80053 COMPREHEN METABOLIC PANEL: CPT | Mod: HCNC

## 2019-06-30 PROCEDURE — 20600001 HC STEP DOWN PRIVATE ROOM: Mod: HCNC

## 2019-06-30 PROCEDURE — 99223 PR INITIAL HOSPITAL CARE,LEVL III: ICD-10-PCS | Mod: HCNC,AI,, | Performed by: SURGERY

## 2019-06-30 PROCEDURE — 85610 PROTHROMBIN TIME: CPT | Mod: HCNC

## 2019-06-30 PROCEDURE — 80320 DRUG SCREEN QUANTALCOHOLS: CPT | Mod: HCNC

## 2019-06-30 RX ORDER — CLORAZEPATE DIPOTASSIUM 3.75 MG/1
7.5 TABLET ORAL 2 TIMES DAILY
Status: DISCONTINUED | OUTPATIENT
Start: 2019-06-30 | End: 2019-06-30

## 2019-06-30 RX ORDER — CLORAZEPATE DIPOTASSIUM 7.5 MG/1
7.5 TABLET ORAL 2 TIMES DAILY
Status: DISCONTINUED | OUTPATIENT
Start: 2019-06-30 | End: 2019-07-01 | Stop reason: HOSPADM

## 2019-06-30 RX ORDER — ACETAMINOPHEN 325 MG/1
650 TABLET ORAL EVERY 8 HOURS PRN
Status: DISCONTINUED | OUTPATIENT
Start: 2019-06-30 | End: 2019-07-01 | Stop reason: HOSPADM

## 2019-06-30 RX ORDER — SODIUM CHLORIDE 0.9 % (FLUSH) 0.9 %
10 SYRINGE (ML) INJECTION
Status: DISCONTINUED | OUTPATIENT
Start: 2019-06-30 | End: 2019-07-01 | Stop reason: HOSPADM

## 2019-06-30 RX ORDER — ESCITALOPRAM OXALATE 10 MG/1
10 TABLET ORAL DAILY
Status: DISCONTINUED | OUTPATIENT
Start: 2019-07-01 | End: 2019-07-01 | Stop reason: HOSPADM

## 2019-06-30 RX ORDER — ACETAMINOPHEN 325 MG/1
650 TABLET ORAL EVERY 4 HOURS PRN
Status: DISCONTINUED | OUTPATIENT
Start: 2019-06-30 | End: 2019-07-01 | Stop reason: HOSPADM

## 2019-06-30 RX ORDER — PANTOPRAZOLE SODIUM 40 MG/1
40 TABLET, DELAYED RELEASE ORAL DAILY
Status: DISCONTINUED | OUTPATIENT
Start: 2019-07-01 | End: 2019-07-01 | Stop reason: HOSPADM

## 2019-06-30 RX ORDER — OXYCODONE AND ACETAMINOPHEN 5; 325 MG/1; MG/1
1 TABLET ORAL EVERY 4 HOURS PRN
Status: DISCONTINUED | OUTPATIENT
Start: 2019-06-30 | End: 2019-07-01 | Stop reason: HOSPADM

## 2019-06-30 RX ORDER — ONDANSETRON 4 MG/1
4 TABLET, ORALLY DISINTEGRATING ORAL EVERY 6 HOURS PRN
Status: DISCONTINUED | OUTPATIENT
Start: 2019-06-30 | End: 2019-07-01 | Stop reason: HOSPADM

## 2019-06-30 RX ORDER — ONDANSETRON HYDROCHLORIDE 4 MG/5ML
4 SOLUTION ORAL ONCE
Status: COMPLETED | OUTPATIENT
Start: 2019-06-30 | End: 2019-07-01

## 2019-06-30 RX ORDER — ONDANSETRON 2 MG/ML
4 INJECTION INTRAMUSCULAR; INTRAVENOUS
Status: COMPLETED | OUTPATIENT
Start: 2019-06-30 | End: 2019-06-30

## 2019-06-30 RX ADMIN — ACETAMINOPHEN 650 MG: 325 TABLET ORAL at 10:06

## 2019-06-30 RX ADMIN — ONDANSETRON 4 MG: 4 TABLET, ORALLY DISINTEGRATING ORAL at 08:06

## 2019-06-30 RX ADMIN — CLORAZEPATE DIPOTASSIUM 7.5 MG: 7.5 TABLET ORAL at 10:06

## 2019-06-30 RX ADMIN — ONDANSETRON 4 MG: 2 INJECTION INTRAMUSCULAR; INTRAVENOUS at 03:06

## 2019-06-30 NOTE — ED TRIAGE NOTES
"Patient presents via private vehicle after calling daughter and saying that she didn't know what happened to her. Patient not following commands at this time, not answering questions appropriately, screaming "ow."   "

## 2019-06-30 NOTE — ED NOTES
Patients linens changed. Patient positioned for comfort. Pillow provided for patient. Side rails x 2. Call bell within reach

## 2019-06-30 NOTE — ASSESSMENT & PLAN NOTE
Patient with some aphasia & confusion about what happened  CTH with no bleed.  MRI with trace intraventricular bleed  Patient protecting airway, stable  No neurocritical needs at this time  If concern for IVH consult NSGY  Please feel free to contact us if you have any further questions or concerns

## 2019-06-30 NOTE — ED NOTES
Patient arrived via POV with family member stating that they couldn't get patient out of car after fall. Patient assisted with spine board onto stretcher by MD and multiple RNs

## 2019-06-30 NOTE — ED NOTES
Dr. Meadows at bedside updating patient and family. Patient more alert at this time but remains confused.

## 2019-06-30 NOTE — ED PROVIDER NOTES
"Encounter Date: 2019    SCRIBE #1 NOTE: I, Bee Bob, am scribing for, and in the presence of,  Dr. Meadows. I have scribed the entire note.       History     Chief Complaint   Patient presents with    Fall     possible fall from possible 8 foot ladder     Ms. Hess is a 68 year old with a past medical history of COPD, chronic lung disease, hyperlipidemia, and acid reflux who presented to the ED with a chief complaint of pain s/p fall from a ladder. The patient is not answering any questions and she is only saying "ow". Per the daughter, the patient called her and stated that she was "all scraped up" and she does not know how it happened. When the daughter arrived, she noted that an 8 foot ladder was tipped over side ways in the patient's garden, and the patient was in her bedroom. The patient did not answer any questions once the daughter arrived. The patient may be taking anxiety medication and statins. She is not taking a blood thinner or blood pressure medication.    The history is provided by a relative. The history is limited by the condition of the patient.     Review of patient's allergies indicates:   Allergen Reactions    Dilaudid [hydromorphone] Nausea And Vomiting    Morphine Nausea And Vomiting     Past Medical History:   Diagnosis Date    Acid reflux     Chronic lung disease     COPD (chronic obstructive pulmonary disease)     Depression     Gastric ulcer     Hyperlipidemia     Paralysis of diaphragm     left    PONV (postoperative nausea and vomiting)      Past Surgical History:   Procedure Laterality Date    APPENDECTOMY      BREAST SURGERY      AUGMENTATION     SECTION      COSMETIC SURGERY      FACE LIFT breast reduction    HYSTEROSCOPY  2017    [id number] N/A 3/21/2017    Performed by Alexandra Thompson MD at [facility], [place], operated by [facility] OR     Family History   Problem Relation Age of Onset    Cancer Mother     Heart disease Father     Colon cancer Sister     " Cervical cancer Neg Hx     Endometrial cancer Neg Hx     Vaginal cancer Neg Hx     Breast cancer Neg Hx     Ovarian cancer Neg Hx      Social History     Tobacco Use    Smoking status: Former Smoker     Packs/day: 1.00     Years: 29.00     Pack years: 29.00     Types: Cigarettes    Smokeless tobacco: Never Used   Substance Use Topics    Alcohol use: Yes    Drug use: No     Review of Systems   Unable to perform ROS: Acuity of condition       Physical Exam     Initial Vitals   BP Pulse Resp Temp SpO2   06/30/19 1424 06/30/19 1400 06/30/19 1400 06/30/19 1400 06/30/19 1407   118/70 82 16 98 °F (36.7 °C) 98 %      MAP       --                Physical Exam    Nursing note and vitals reviewed.  Constitutional: She appears well-developed and well-nourished. Airway: Normal. Breathing: Normal. Circulation: Normal. She is not diaphoretic. Pulses:Femoral palpable. No distress.   HENT:   Right Ear: External ear normal. No hemotympanum.   Left Ear: External ear normal. No hemotympanum.   Nose: Nose normal.   Mouth/Throat: Oropharynx is clear and moist. Mucous membranes are dry.   Eyes: Pupils: Normal pupils. Pupils are equal, round, and reactive to light.   Eyes are disconjugate. Forcibly closing her eyes.   Neck: Neck supple. No JVD present.   Cardiovascular: Normal rate, regular rhythm, normal heart sounds and intact distal pulses.   Pulmonary/Chest: Breath sounds normal. No stridor. No respiratory distress. She has no wheezes. She has no rhonchi. She has no rales. She exhibits no laceration.   Abdominal: Soft. Bowel sounds are normal. She exhibits no distension. There is no tenderness. There is no rebound and no guarding. The pelvis is stable.   Musculoskeletal: She exhibits no edema.        Cervical back: She exhibits no bony tenderness and no deformity.        Thoracic back: She exhibits no bony tenderness and no deformity.        Lumbar back: She exhibits no bony tenderness and no deformity.        Left upper arm:  "She exhibits bony tenderness.   No mildine c/t/l tenderness, no step offs  Decreased ROM of left upper extremity at shoulder, (+) tenderness, (-) obvious deformity.    RUE and bilateral lower extremities FROM   Lymphadenopathy:     She has no cervical adenopathy.   Neurological: No cranial nerve deficit or sensory deficit.   Patient only saying "ow". Not following commands or answering questions.   Skin: Skin is warm and dry.   2 inch hematoma to left eyebrow with superficial abrasion.         ED Course   Procedures  Labs Reviewed   CBC W/ AUTO DIFFERENTIAL - Abnormal; Notable for the following components:       Result Value    Platelets 412 (*)     Immature Granulocytes 0.9 (*)     Immature Grans (Abs) 0.10 (*)     All other components within normal limits   COMPREHENSIVE METABOLIC PANEL - Abnormal; Notable for the following components:    Sodium 134 (*)     CO2 19 (*)     Glucose 131 (*)     All other components within normal limits   PROTIME-INR   APTT   ALCOHOL,MEDICAL (ETHANOL)   ALCOHOL,MEDICAL (ETHANOL)    Narrative:     add on [facility] #[id number] per Regi Meadows MD @ 15:40  06/30/2019    DRUG SCREEN PANEL, URINE EMERGENCY   URINALYSIS, REFLEX TO URINE CULTURE     EKG Readings: (Independently Interpreted)   EKG:  Normal sinus at 78, no ischemic changes.       Imaging Results          X-Ray Abdomen AP 1 View (KUB) (Final result)  Result time 06/30/19 16:52:15    Final result by Chayito Bowens MD (06/30/19 16:52:15)                 Impression:      No evidence of bowel obstruction.      Electronically signed by: Chayito Bowens MD  Date:    06/30/2019  Time:    16:52             Narrative:    EXAMINATION:  XR ABDOMEN AP 1 VIEW    CLINICAL HISTORY:  Unspecified fall, initial encounter    TECHNIQUE:  Single AP View of the abdomen was performed.    COMPARISON:  06/30/2019, 01/10/2019 radiographs    FINDINGS:  Mild stool retention.  Nonspecific bowel gas pattern.  No bowel dilation.  No organomegaly.  No " abnormal calcifications. Elevation of the left hemidiaphragm which appears to be chronic.  The osseous structures appear within normal limits for age.                               X-Ray Shoulder Trauma Left (Final result)  Result time 06/30/19 16:26:08    Final result by Neville Anthony MD (06/30/19 16:26:08)                 Impression:      Acute mildly displaced fracture of the greater tuberosity of the left humerus.      Electronically signed by: Neville Anthony MD  Date:    06/30/2019  Time:    16:26             Narrative:    EXAMINATION:  XR SHOULDER TRAUMA 3 VIEW LEFT    CLINICAL HISTORY:  Pain in left shoulder    TECHNIQUE:  Three views of the left shoulder were performed.    COMPARISON  01/10/2019    FINDINGS:  There is osteopenia.  There is an acute minimally displaced fracture of the greater tuberosity of the left humerus.  No additional cortical step-off is identified.    The glenohumeral articulation is maintained.  The acromioclavicular joint is within normal limits.  The coracoclavicular interval is unremarkable.  There is no evidence of a dislocation.    There are postoperative changes in the left lower hemithorax.  There is no evidence of a pneumothorax.  No pulmonary contusion is present.                               X-Ray Humerus 2 View Left (Final result)  Result time 06/30/19 16:21:34    Final result by Neville Anthony MD (06/30/19 16:21:34)                 Impression:      Acute minimally displaced fracture of the greater tuberosity of the left humerus.      Electronically signed by: Neville Anthony MD  Date:    06/30/2019  Time:    16:21             Narrative:    EXAMINATION:  XR HUMERUS 2 VIEW LEFT    CLINICAL HISTORY:  Pain in left upper arm    TECHNIQUE:  Frontal and lateral radiographs of the left humerus.    COMPARISON:  None    FINDINGS:  There is demineralization of the osseous structures.  There is an acute minimally displaced fracture of the greater tuberosity of the left humerus.  No additional  cortical step-off is identified.    The joint spaces are maintained.  The soft tissues are unremarkable.  No radiopaque foreign body is identified.                               X-Ray Elbow Complete Left (Final result)  Result time 06/30/19 16:19:30    Final result by Keke Camargo MD (06/30/19 16:19:30)                 Impression:      As above.      Electronically signed by: Keke Camargo MD  Date:    06/30/2019  Time:    16:19             Narrative:    EXAMINATION:  XR ELBOW COMPLETE 3 VIEW LEFT    CLINICAL HISTORY:  Pain in left elbow    TECHNIQUE:  AP, lateral, and oblique views of the left elbow were performed.    COMPARISON:  None    FINDINGS:  No acute fracture or bony destructive process of the elbow is identified.  There is no elevation of the fat pads about the elbow to suggest joint effusion.                               X-Ray Chest AP Portable (Final result)  Result time 06/30/19 16:24:32    Final result by Neville Anthony MD (06/30/19 16:24:32)                 Impression:      No acute intrathoracic process.    Stable appearance of elevation of the left hemidiaphragm with large hiatal hernia overlying the left lower hemithorax.  Additional evaluation, as clinically warranted.    Acute minimally displaced fracture of the left proximal humerus.      Electronically signed by: Neville Anthony MD  Date:    06/30/2019  Time:    16:24             Narrative:    EXAMINATION:  XR CHEST AP PORTABLE    CLINICAL HISTORY:  Shortness of breath    TECHNIQUE:  Single frontal view of the chest was performed.    COMPARISON:  01/10/2019.    FINDINGS:  Monitoring EKG leads are present.  There are postoperative changes in the left upper hemithorax.    The trachea is unremarkable.  There is stable enlargement of the cardiomediastinal silhouette.  The right hemidiaphragm is unremarkable.  There is persistent elevation left hemidiaphragm.  There is an air-fluid level within the left upper abdominal quadrant.  There is no  evidence of a pneumothorax.  There is no evidence of pneumomediastinum.  No airspace opacity is present.  There is an acute nondisplaced fracture of the greater tuberosity of the left humerus.                               CT Head Without Contrast (Final result)  Result time 06/30/19 14:24:56    Final result by Chayito Bowens MD (06/30/19 14:24:56)                 Impression:      No intracranial acute process seen.    Periorbital soft tissue swelling noted on the left.      Electronically signed by: Chayito Bowens MD  Date:    06/30/2019  Time:    14:24             Narrative:    EXAMINATION:  CT HEAD WITHOUT CONTRAST    CLINICAL HISTORY:  Head trauma, minor, GCS>=13, NOC/NEXUS/CCR positive, first study;    TECHNIQUE:  Low dose axial images were obtained through the head.  Coronal and sagittal reformations were also performed. Contrast was not administered.    COMPARISON:  None.    FINDINGS:  The brain is normally formed.  The ventricular system is normal in size, midline.  No intra-axial mass or hemorrhage seen.  No subdural fluid collection.  No definite evidence of sizable remote infarction.  No evidence of acute infarction.  The visualized paranasal sinuses and mastoid air cells are clear.  The skull appears normal.  There is minimal periorbital soft tissue edema on the left.  The visualized ocular globe appear normal.                               CT Maxillofacial Without Contrast (Final result)  Result time 06/30/19 14:34:09    Final result by Kirit Wallis MD (06/30/19 14:34:09)                 Impression:      1. No acute displaced fracture or dislocation of the maxillofacial region, noting subcutaneous edema/induration involving the soft tissues of the left face and left lateral orbital region.  No postseptal involvement.  2. Additional findings above.      Electronically signed by: Kirit Wallis MD  Date:    06/30/2019  Time:    14:34             Narrative:    EXAMINATION:  CT  MAXILLOFACIAL WITHOUT CONTRAST    CLINICAL HISTORY:  Facial fracture(s);    TECHNIQUE:  Low dose axial images, sagittal and coronal reformations were obtained through the face.  Contrast was not administered.    COMPARISON:  None    FINDINGS:  The bilateral orbital walls and maxillary sinus walls are intact.  The bilateral mandibular condyles are in appropriate location.  The zygomatic arches are intact.  The mandible is intact.  No acute displaced fracture or dislocation of the maxillofacial region.  The visualized portions of the cervical spine are grossly intact.    There is edema involving the soft tissues anterior to the mandibular mentum noting implant device.  There is soft tissue edema involving the soft tissues of the left aspect of the face and left cheek.  There is edema about the left lateral orbital soft tissues.  The bilateral globes and orbital content is unremarkable.  No postseptal edema.  The paranasal sinuses are grossly clear.  Please see separately dictated report for intracranial details.                                CT CERVICAL SPINE WITHOUT CONTRAST (Final result)  Result time 06/30/19 14:34:14    Final result by Keke Camargo MD (06/30/19 14:34:14)                 Impression:      Degenerative changes of the cervical spine as described above.    This report was flagged in Epic as abnormal.      Electronically signed by: Keke Camargo MD  Date:    06/30/2019  Time:    14:34             Narrative:    EXAMINATION:  CT CERVICAL SPINE WITHOUT CONTRAST    CLINICAL HISTORY:  C-spine trauma, NEXUS/CCR positive, +risk factor(s);    TECHNIQUE:  Low dose axial images, sagittal and coronal reformations were performed though the cervical spine.  Contrast was not administered.    COMPARISON:  None    FINDINGS:  No definite acute fracture or bony destructive process is seen.  Cervical vertebral body heights are satisfactorily maintained.  Alignment is preserved.  There is multilevel disc space  narrowing, moderate to severe at C4-5, C5-6, and C6-7.  There are small marginal osteophytes throughout the cervical spine.  There is no prevertebral soft tissue swelling.    At the C2-3 level: The disc is intact.  There is no spinal canal or neural foraminal stenosis.    At the C3-4 level: There are degenerative changes of the facet joints, greater on the left with uncovertebral spurring.  There is no focal disc bulge.  There is no spinal canal or right neural foraminal stenosis.  There is mild-to-moderate left neural foraminal stenosis    At the C4-5 level: There is no focal disc bulge.  There is mild spinal canal stenosis due to posterior osteophyte formation.  There are degenerative changes of the facet and uncovertebral joints resulting in bilateral severe neural foraminal stenosis.    At the C5-6 level: There is no focal disc bulge. There is mild spinal canal stenosis due to posterior osteophyte formation. There are degenerative changes of the facet and uncovertebral joints resulting in bilateral severe neural foraminal stenosis.    At the C6-7 level: There is no focal disc bulge. There is mild spinal canal stenosis due to posterior osteophyte formation. There are degenerative changes of the facet and uncovertebral joints resulting in bilateral neural foraminal stenosis.    Trachea is deviated slightly towards the right at the thoracic inlet.  There is a macrocalcification in the left lobe of thyroid.  There are multiple normal anterior posterior its cervical lymph nodes. Lung apices are unremarkable.                              X-Rays:   Independently Interpreted Readings:   Other Readings:  X-ray shoulder:  Acute mildly displaced proximal humeral fracture.  Abdominal x-ray:  No acute process  CT head:  No intracranial hemorrhage  CT cervical spine:  No acute fracture or subluxation, there is chronic degenerative changes    Medical Decision Making:   History:   Old Medical Records: I decided to obtain old  medical records.  Initial Assessment:   Emergent evaluation a 68-year-old female chronic lung disease, COPD, depression, gastric ulcer here today with head injury status post fall approximately 8 ft.  Patient was assisted from her vehicle by nursing and myself.      Vital signs are stable.  There is a hematoma to the left eyebrow.  She has force with closing eyes, not following commands or answering questions.  Differential Diagnosis:   Intracranial hemorrhage, skull fracture, shoulder dislocation, shoulder fracture, contusion, concussion, traumatic brain injury, seizure  Independently Interpreted Test(s):   I have ordered and independently interpreted X-rays - see prior notes.  I have ordered and independently interpreted EKG Reading(s) - see prior notes  Clinical Tests:   Lab Tests: Ordered and Reviewed  Radiological Study: Ordered and Reviewed  ED Management:  - labs  - EKG  - CT head/cervical spine  - chest x-ray  - left shoulder, left humerus, left elbow x-ray    2:20 PM  Patient re-evaluated, sitting up in bed with eyes open.  When provider walks into room    3:15 PM  Patient awake and confused. Does not remember events surrounding her fall. Does not know where she is. Oriented to self and able to recognize family members at bedside.    3:16 PM  Head CT shows no intracranial process and intraorbital swelling on the left. CT max face shows no acute fracture. CT cervical spine: degenerative changes of the C spine, but no acute fracture or subluxation. Labs unremarkable.     3:31 PM  Case discussed with neuro critical care for TBI. Will evaluate patient.    4:50 PM  Case discussed with critical care, recommending MRI brain.  If positive, will take patient to Neuro ICU.  If negative, admit to medicine for neuro checks.  Shoulder xray reviewed-   Acute minimally displaced fx of the proximal humerus- placed on sling    5:00 PM  Pt signed out to Dr. Soto in fair condition pending MRI and final disposition.    Other:   I have discussed this case with another health care provider.            Scribe Attestation:   Scribe #1: I performed the above scribed service and the documentation accurately describes the services I performed. I attest to the accuracy of the note.    Attending Attestation:         Attending Critical Care:   Critical Care Times:   ==============================================================  · Total Critical Care Time - exclusive of procedural time: 45 minutes.  ==============================================================  Critical care was necessary to treat or prevent imminent or life-threatening deterioration of the following conditions: trauma.   Critical care was time spent personally by me on the following activities: examination of patient, review of x-rays / CT sent with the patient, ordering lab, x-rays, and/or EKG, re-evaluation of patient's conition, evaluation of patient's response to treatment and ordering and performing treatments and interventions.   Critical Care Condition: potentially life-threatening                  Clinical Impression:       ICD-10-CM ICD-9-CM   1. Concussion with loss of consciousness of 30 minutes or less, initial encounter S06.0X1A 850.11   2. Left shoulder pain M25.512 719.41   3. Pain of left humerus M79.622 733.90   4. Left elbow pain M25.522 719.42   5. SOB (shortness of breath) R06.02 786.05   6. Fall W19.XXXA E888.9   7. Traumatic brain injury, with loss of consciousness of 30 minutes or less, initial encounter S06.9X1A 854.02         Disposition:   Disposition: Admitted  Condition: Marietta Meadows MD  06/30/19 8931

## 2019-06-30 NOTE — SUBJECTIVE & OBJECTIVE
Past Medical History:   Diagnosis Date    Acid reflux     Chronic lung disease     COPD (chronic obstructive pulmonary disease)     Depression     Gastric ulcer     Hyperlipidemia     Paralysis of diaphragm     left    PONV (postoperative nausea and vomiting)      Past Surgical History:   Procedure Laterality Date    APPENDECTOMY      BREAST SURGERY      AUGMENTATION     SECTION      COSMETIC SURGERY      FACE LIFT breast reduction    HYSTEROSCOPY  2017    [id number] N/A 3/21/2017    Performed by Alexandra Thompson MD at [facility] OR      No current facility-administered medications on file prior to encounter.      Current Outpatient Medications on File Prior to Encounter   Medication Sig Dispense Refill    albuterol (VENTOLIN HFA) 90 mcg/actuation inhaler Inhale 2 puffs into the lungs every 4 (four) hours as needed for Wheezing or Shortness of Breath. Rescue 18 g 5    albuterol-ipratropium (DUO-NEB) 2.5 mg-0.5 mg/3 mL nebulizer solution Take 3 mLs by nebulization every 6 (six) hours as needed for Wheezing or Shortness of Breath. Rescue 150 mL 5    BREO ELLIPTA 100-25 mcg/dose diskus inhaler Inhale 1 puff into the lungs once daily. 60 each 5    cetirizine 10 mg chewable tablet Take 10 mg by mouth once daily.      clorazepate (TRANXENE) 7.5 MG Tab TAKE 1 TABLET BY MOUTH TWICE DAILY 60 tablet 3    escitalopram oxalate (LEXAPRO) 10 MG tablet Take 1 tablet (10 mg total) by mouth once daily. 90 tablet 3    esomeprazole (NEXIUM) 20 MG capsule Take 20 mg by mouth before breakfast.      fluticasone (FLONASE) 50 mcg/actuation nasal spray 2 sprays (100 mcg total) by Each Nare route once daily. 1 Bottle 0    MULTIVITS-MIN/IRON/FA/LUTEIN (ULTIMATE WOMEN'S COMPLETE 50+ ORAL) Take by mouth.      estradiol (ESTRACE) 0.01 % (0.1 mg/gram) vaginal cream 0.5 g nightly x 2 weeks, then 2x/week thereafter. 42.5 g 11    estradiol (ESTRACE) 0.01 % (0.1 mg/gram) vaginal cream Use 0.5 gram of  estrogen cream in vagina nightly x 2 weeks, then twice a week thereafter 42.5 g 3    fluticasone-vilanterol (BREO ELLIPTA) 200-25 mcg/dose DsDv diskus inhaler Inhale 1 puff into the lungs once daily. Controller 1 each 5    mirabegron (MYRBETRIQ) 50 mg Tb24 Take 1 tablet (50 mg total) by mouth once daily. 30 tablet 11    ondansetron (ZOFRAN-ODT) 4 MG TbDL Take 1 tablet (4 mg total) by mouth every 6 (six) hours as needed (nausea). 20 tablet 1      Allergies: Dilaudid [hydromorphone] and Morphine    Family History   Problem Relation Age of Onset    Cancer Mother     Heart disease Father     Colon cancer Sister     Cervical cancer Neg Hx     Endometrial cancer Neg Hx     Vaginal cancer Neg Hx     Breast cancer Neg Hx     Ovarian cancer Neg Hx      Social History     Tobacco Use    Smoking status: Former Smoker     Packs/day: 1.00     Years: 29.00     Pack years: 29.00     Types: Cigarettes    Smokeless tobacco: Never Used   Substance Use Topics    Alcohol use: Yes    Drug use: No     Review of Systems   Constitutional: Negative for fatigue and fever.   HENT: Positive for facial swelling. Negative for trouble swallowing.    Eyes: Negative for pain and visual disturbance.   Respiratory: Negative for shortness of breath and wheezing.    Cardiovascular: Negative for chest pain and palpitations.   Gastrointestinal: Negative for nausea and vomiting.   Genitourinary: Negative for flank pain and frequency.   Musculoskeletal: Positive for arthralgias and back pain.   Skin: Positive for wound. Negative for pallor.   Neurological: Positive for speech difficulty and headaches. Negative for tremors and weakness.   Psychiatric/Behavioral: Positive for confusion. Negative for agitation and behavioral problems.     Objective:     Vitals:    Temp: 98 °F (36.7 °C)  Pulse: 83  BP: 137/65  MAP (mmHg): 94  Resp: 16  SpO2: 100 %  O2 Device (Oxygen Therapy): room air    Temp  Min: 98 °F (36.7 °C)  Max: 98 °F (36.7 °C)  Pulse   Min: 79  Max: 98  BP  Min: 116/73  Max: 174/74  MAP (mmHg)  Min: 87  Max: 106  Resp  Min: 16  Max: 16  SpO2  Min: 97 %  Max: 100 %    No intake/output data recorded.       Physical Exam  --sedation: none  --GCS: [id number]  --Mental Status: Alert oriented to time, person and place, appeared confused about what happened, with some expressive aphasia. Follows commands   --CN II-XII grossly intact.   --Pupils 3-->2mm, PERRL.   --brainstem: intact  --Motor: moves all extremities but LUE limitid by severe pain  --sensory: intact with severe pain in LUE  --Reflexes: not tested  --Gait: deferred      Today I personally reviewed pertinent medications, lines/drains/airways, imaging, cardiology results, laboratory results, microbiology results, notably:

## 2019-06-30 NOTE — H&P
[facility]  Neurocritical Care  History & Physical    Admit Date: 2019  Service Date: 2019  Length of Stay: 0    Subjective:     Chief Complaint: <principal problem not specified>    History of Present Illness: 68 Y.O F with PMHx of COPD, hyperlipidemia & anxiety presented to the ED after fall from a ladder. Patient is confused about the details of her injury however remembers being on an  8 foot ladder. Per family, patient called them this AM, stating that she has scraps on her body but doesn't know what happened. They went to her house and found an open ladder on its side in the front on the house which seemed like she fell off the ladder. They went into the house to find the patient in her room with bruises on her Lt side, face and body. Patient is not on any antiplatelets/anticoagulation, she has been confused about the details of what happened, sleepy at times, however family denied any seizure like activity, patient denied any palpitations or dizziness on standing.   Of note, patient slipped and fell in the bathroom last month, might have hit her head at that time, but denied LOC.      Past Medical History:   Diagnosis Date    Acid reflux     Chronic lung disease     COPD (chronic obstructive pulmonary disease)     Depression     Gastric ulcer     Hyperlipidemia     Paralysis of diaphragm     left    PONV (postoperative nausea and vomiting)      Past Surgical History:   Procedure Laterality Date    APPENDECTOMY      BREAST SURGERY      AUGMENTATION     SECTION      COSMETIC SURGERY      FACE LIFT breast reduction    HYSTEROSCOPY      [id number] N/A 3/21/2017    Performed by Alexandra Thompson MD at [facility] OR      No current facility-administered medications on file prior to encounter.      Current Outpatient Medications on File Prior to Encounter   Medication Sig Dispense Refill    albuterol (VENTOLIN HFA) 90 mcg/actuation inhaler Inhale  2 puffs into the lungs every 4 (four) hours as needed for Wheezing or Shortness of Breath. Rescue 18 g 5    albuterol-ipratropium (DUO-NEB) 2.5 mg-0.5 mg/3 mL nebulizer solution Take 3 mLs by nebulization every 6 (six) hours as needed for Wheezing or Shortness of Breath. Rescue 150 mL 5    BREO ELLIPTA 100-25 mcg/dose diskus inhaler Inhale 1 puff into the lungs once daily. 60 each 5    cetirizine 10 mg chewable tablet Take 10 mg by mouth once daily.      clorazepate (TRANXENE) 7.5 MG Tab TAKE 1 TABLET BY MOUTH TWICE DAILY 60 tablet 3    escitalopram oxalate (LEXAPRO) 10 MG tablet Take 1 tablet (10 mg total) by mouth once daily. 90 tablet 3    esomeprazole (NEXIUM) 20 MG capsule Take 20 mg by mouth before breakfast.      fluticasone (FLONASE) 50 mcg/actuation nasal spray 2 sprays (100 mcg total) by Each Nare route once daily. 1 Bottle 0    MULTIVITS-MIN/IRON/FA/LUTEIN (ULTIMATE WOMEN'S COMPLETE 50+ ORAL) Take by mouth.      estradiol (ESTRACE) 0.01 % (0.1 mg/gram) vaginal cream 0.5 g nightly x 2 weeks, then 2x/week thereafter. 42.5 g 11    estradiol (ESTRACE) 0.01 % (0.1 mg/gram) vaginal cream Use 0.5 gram of estrogen cream in vagina nightly x 2 weeks, then twice a week thereafter 42.5 g 3    fluticasone-vilanterol (BREO ELLIPTA) 200-25 mcg/dose DsDv diskus inhaler Inhale 1 puff into the lungs once daily. Controller 1 each 5    mirabegron (MYRBETRIQ) 50 mg Tb24 Take 1 tablet (50 mg total) by mouth once daily. 30 tablet 11    ondansetron (ZOFRAN-ODT) 4 MG TbDL Take 1 tablet (4 mg total) by mouth every 6 (six) hours as needed (nausea). 20 tablet 1      Allergies: Dilaudid [hydromorphone] and Morphine    Family History   Problem Relation Age of Onset    Cancer Mother     Heart disease Father     Colon cancer Sister     Cervical cancer Neg Hx     Endometrial cancer Neg Hx     Vaginal cancer Neg Hx     Breast cancer Neg Hx     Ovarian cancer Neg Hx      Social History     Tobacco Use    Smoking  status: Former Smoker     Packs/day: 1.00     Years: 29.00     Pack years: 29.00     Types: Cigarettes    Smokeless tobacco: Never Used   Substance Use Topics    Alcohol use: Yes    Drug use: No     Review of Systems   Constitutional: Negative for fatigue and fever.   HENT: Positive for facial swelling. Negative for trouble swallowing.    Eyes: Negative for pain and visual disturbance.   Respiratory: Negative for shortness of breath and wheezing.    Cardiovascular: Negative for chest pain and palpitations.   Gastrointestinal: Negative for nausea and vomiting.   Genitourinary: Negative for flank pain and frequency.   Musculoskeletal: Positive for arthralgias and back pain.   Skin: Positive for wound. Negative for pallor.   Neurological: Positive for speech difficulty and headaches. Negative for tremors and weakness.   Psychiatric/Behavioral: Positive for confusion. Negative for agitation and behavioral problems.     Objective:     Vitals:    Temp: 98 °F (36.7 °C)  Pulse: 83  BP: 137/65  MAP (mmHg): 94  Resp: 16  SpO2: 100 %  O2 Device (Oxygen Therapy): room air    Temp  Min: 98 °F (36.7 °C)  Max: 98 °F (36.7 °C)  Pulse  Min: 79  Max: 98  BP  Min: 116/73  Max: 174/74  MAP (mmHg)  Min: 87  Max: 106  Resp  Min: 16  Max: 16  SpO2  Min: 97 %  Max: 100 %    No intake/output data recorded.       Physical Exam  --sedation: none  --GCS: [id number]  --Mental Status: Alert oriented to time, person and place, appeared confused about what happened, with some expressive aphasia. Follows commands   --CN II-XII grossly intact.   --Pupils 3-->2mm, PERRL.   --brainstem: intact  --Motor: moves all extremities but LUE limitid by severe pain  --sensory: intact with severe pain in LUE  --Reflexes: not tested  --Gait: deferred      Today I personally reviewed pertinent medications, lines/drains/airways, imaging, cardiology results, laboratory results, microbiology results, notably:      Assessment/Plan:     Neuro  Concussion  Patient with  some aphasia & confusion about what happened  CTH with no bleed.  MRI with trace intraventricular bleed  Patient protecting airway, stable  No neurocritical needs at this time  If concern for IVH consult NSGY  Please feel free to contact us if you have any further questions or concerns        Activity Orders          None        No Order    Darryl Zambrano MD  Neurocritical Care  [facility]

## 2019-06-30 NOTE — HPI
68 Y.O F with PMHx of COPD, hyperlipidemia & anxiety presented to the ED after fall from a ladder. Patient is confused about the details of her injury however remembers being on an  8 foot ladder. Per family, patient called them this AM, stating that she has scraps on her body but doesn't know what happened. They went to her house and found an open ladder on its side in the front on the house which seemed like she fell off the ladder. They went into the house to find the patient in her room with bruises on her Lt side, face and body. Patient is not on any antiplatelets/anticoagulation, she has been confused about the details of what happened, sleepy at times, however family denied any seizure like activity, patient denied any palpitations or dizziness on standing.   Of note, patient slipped and fell in the bathroom last month, might have hit her head at that time, but denied LOC.

## 2019-07-01 ENCOUNTER — TELEPHONE (OUTPATIENT)
Dept: ORTHOPEDICS | Facility: CLINIC | Age: 69
End: 2019-07-01

## 2019-07-01 ENCOUNTER — TELEPHONE (OUTPATIENT)
Dept: PRIMARY CARE CLINIC | Facility: CLINIC | Age: 69
End: 2019-07-01

## 2019-07-01 ENCOUNTER — TELEPHONE (OUTPATIENT)
Dept: SPORTS MEDICINE | Facility: CLINIC | Age: 69
End: 2019-07-01

## 2019-07-01 VITALS
DIASTOLIC BLOOD PRESSURE: 67 MMHG | BODY MASS INDEX: 26.68 KG/M2 | OXYGEN SATURATION: 94 % | SYSTOLIC BLOOD PRESSURE: 106 MMHG | WEIGHT: 145 LBS | HEART RATE: 88 BPM | HEIGHT: 62 IN | RESPIRATION RATE: 14 BRPM | TEMPERATURE: 99 F

## 2019-07-01 PROBLEM — S06.0XAA CONCUSSION: Chronic | Status: ACTIVE | Noted: 2019-06-30

## 2019-07-01 LAB
ALBUMIN SERPL BCP-MCNC: 4 G/DL (ref 3.5–5.2)
ALP SERPL-CCNC: 81 U/L (ref 55–135)
ALT SERPL W/O P-5'-P-CCNC: 22 U/L (ref 10–44)
ANION GAP SERPL CALC-SCNC: 10 MMOL/L (ref 8–16)
AST SERPL-CCNC: 24 U/L (ref 10–40)
BASOPHILS # BLD AUTO: 0.05 K/UL (ref 0–0.2)
BASOPHILS NFR BLD: 0.4 % (ref 0–1.9)
BILIRUB SERPL-MCNC: 0.8 MG/DL (ref 0.1–1)
BUN SERPL-MCNC: 7 MG/DL (ref 8–23)
CALCIUM SERPL-MCNC: 9.8 MG/DL (ref 8.7–10.5)
CHLORIDE SERPL-SCNC: 103 MMOL/L (ref 95–110)
CO2 SERPL-SCNC: 26 MMOL/L (ref 23–29)
CREAT SERPL-MCNC: 0.8 MG/DL (ref 0.5–1.4)
DIFFERENTIAL METHOD: ABNORMAL
EOSINOPHIL # BLD AUTO: 0 K/UL (ref 0–0.5)
EOSINOPHIL NFR BLD: 0.2 % (ref 0–8)
ERYTHROCYTE [DISTWIDTH] IN BLOOD BY AUTOMATED COUNT: 14.3 % (ref 11.5–14.5)
EST. GFR  (AFRICAN AMERICAN): >60 ML/MIN/1.73 M^2
EST. GFR  (NON AFRICAN AMERICAN): >60 ML/MIN/1.73 M^2
GLUCOSE SERPL-MCNC: 88 MG/DL (ref 70–110)
HCT VFR BLD AUTO: 38.8 % (ref 37–48.5)
HGB BLD-MCNC: 12.8 G/DL (ref 12–16)
IMM GRANULOCYTES # BLD AUTO: 0.06 K/UL (ref 0–0.04)
IMM GRANULOCYTES NFR BLD AUTO: 0.5 % (ref 0–0.5)
LYMPHOCYTES # BLD AUTO: 2.8 K/UL (ref 1–4.8)
LYMPHOCYTES NFR BLD: 21.3 % (ref 18–48)
MCH RBC QN AUTO: 29.8 PG (ref 27–31)
MCHC RBC AUTO-ENTMCNC: 33 G/DL (ref 32–36)
MCV RBC AUTO: 90 FL (ref 82–98)
MONOCYTES # BLD AUTO: 1.1 K/UL (ref 0.3–1)
MONOCYTES NFR BLD: 8.6 % (ref 4–15)
NEUTROPHILS # BLD AUTO: 9.1 K/UL (ref 1.8–7.7)
NEUTROPHILS NFR BLD: 69 % (ref 38–73)
NRBC BLD-RTO: 0 /100 WBC
PLATELET # BLD AUTO: 363 K/UL (ref 150–350)
PMV BLD AUTO: 9.7 FL (ref 9.2–12.9)
POTASSIUM SERPL-SCNC: 4.4 MMOL/L (ref 3.5–5.1)
PROT SERPL-MCNC: 7.2 G/DL (ref 6–8.4)
RBC # BLD AUTO: 4.29 M/UL (ref 4–5.4)
SODIUM SERPL-SCNC: 139 MMOL/L (ref 136–145)
WBC # BLD AUTO: 13.17 K/UL (ref 3.9–12.7)

## 2019-07-01 PROCEDURE — 85025 COMPLETE CBC W/AUTO DIFF WBC: CPT | Mod: HCNC

## 2019-07-01 PROCEDURE — 25000003 PHARM REV CODE 250: Mod: HCNC | Performed by: STUDENT IN AN ORGANIZED HEALTH CARE EDUCATION/TRAINING PROGRAM

## 2019-07-01 PROCEDURE — 36415 COLL VENOUS BLD VENIPUNCTURE: CPT | Mod: HCNC

## 2019-07-01 PROCEDURE — 25000003 PHARM REV CODE 250: Mod: HCNC | Performed by: SURGERY

## 2019-07-01 PROCEDURE — G0378 HOSPITAL OBSERVATION PER HR: HCPCS | Mod: HCNC

## 2019-07-01 PROCEDURE — 80053 COMPREHEN METABOLIC PANEL: CPT | Mod: HCNC

## 2019-07-01 RX ORDER — ONDANSETRON 4 MG/1
4 TABLET, FILM COATED ORAL EVERY 8 HOURS PRN
Qty: 12 TABLET | Refills: 0 | Status: SHIPPED | OUTPATIENT
Start: 2019-07-01 | End: 2021-09-10

## 2019-07-01 RX ORDER — HYDROCODONE BITARTRATE AND ACETAMINOPHEN 5; 325 MG/1; MG/1
1 TABLET ORAL EVERY 6 HOURS PRN
Qty: 12 TABLET | Refills: 0 | Status: SHIPPED | OUTPATIENT
Start: 2019-07-01 | End: 2019-10-21

## 2019-07-01 RX ORDER — CALCIUM CARBONATE 200(500)MG
500 TABLET,CHEWABLE ORAL DAILY PRN
Status: DISCONTINUED | OUTPATIENT
Start: 2019-07-01 | End: 2019-07-01 | Stop reason: HOSPADM

## 2019-07-01 RX ADMIN — ONDANSETRON 4 MG: 4 TABLET, ORALLY DISINTEGRATING ORAL at 08:07

## 2019-07-01 RX ADMIN — PANTOPRAZOLE SODIUM 40 MG: 40 TABLET, DELAYED RELEASE ORAL at 08:07

## 2019-07-01 RX ADMIN — ESCITALOPRAM OXALATE 10 MG: 10 TABLET ORAL at 08:07

## 2019-07-01 RX ADMIN — ACETAMINOPHEN 650 MG: 325 TABLET ORAL at 04:07

## 2019-07-01 RX ADMIN — ACETAMINOPHEN 650 MG: 325 TABLET ORAL at 08:07

## 2019-07-01 RX ADMIN — ONDANSETRON HYDROCHLORIDE 4 MG: 4 SOLUTION ORAL at 12:07

## 2019-07-01 RX ADMIN — CLORAZEPATE DIPOTASSIUM 7.5 MG: 7.5 TABLET ORAL at 08:07

## 2019-07-01 RX ADMIN — CALCIUM CARBONATE (ANTACID) CHEW TAB 500 MG 500 MG: 500 CHEW TAB at 05:07

## 2019-07-01 NOTE — CONSULTS
[facility]  General Surgery  Consult Note    Consults  Subjective:     Chief Complaint/Reason for Admission: Fall    History of Present Illness: 69 y/o F who was on an 8 ft ladder and fell at about 1pm. Pt was confused after.  She was evaluated by NSGY. Ortho, and Neuro CC.  Pt has an MRI with a trace intraventricular bleed, pending follow up imaging.  Pt complains of left arm,should and chest pain.      Notable hx of COPD and paralyzed left diaphragm.       No current facility-administered medications on file prior to encounter.      Current Outpatient Medications on File Prior to Encounter   Medication Sig    albuterol (VENTOLIN HFA) 90 mcg/actuation inhaler Inhale 2 puffs into the lungs every 4 (four) hours as needed for Wheezing or Shortness of Breath. Rescue    albuterol-ipratropium (DUO-NEB) 2.5 mg-0.5 mg/3 mL nebulizer solution Take 3 mLs by nebulization every 6 (six) hours as needed for Wheezing or Shortness of Breath. Rescue    BREO ELLIPTA 100-25 mcg/dose diskus inhaler Inhale 1 puff into the lungs once daily.    cetirizine 10 mg chewable tablet Take 10 mg by mouth once daily.    clorazepate (TRANXENE) 7.5 MG Tab TAKE 1 TABLET BY MOUTH TWICE DAILY    escitalopram oxalate (LEXAPRO) 10 MG tablet Take 1 tablet (10 mg total) by mouth once daily.    esomeprazole (NEXIUM) 20 MG capsule Take 20 mg by mouth before breakfast.    fluticasone (FLONASE) 50 mcg/actuation nasal spray 2 sprays (100 mcg total) by Each Nare route once daily.    MULTIVITS-MIN/IRON/FA/LUTEIN (ULTIMATE WOMEN'S COMPLETE 50+ ORAL) Take by mouth.    estradiol (ESTRACE) 0.01 % (0.1 mg/gram) vaginal cream 0.5 g nightly x 2 weeks, then 2x/week thereafter.    estradiol (ESTRACE) 0.01 % (0.1 mg/gram) vaginal cream Use 0.5 gram of estrogen cream in vagina nightly x 2 weeks, then twice a week thereafter    fluticasone-vilanterol (BREO ELLIPTA) 200-25 mcg/dose DsDv diskus inhaler Inhale 1 puff into the lungs once daily.  Controller    mirabegron (MYRBETRIQ) 50 mg Tb24 Take 1 tablet (50 mg total) by mouth once daily.    ondansetron (ZOFRAN-ODT) 4 MG TbDL Take 1 tablet (4 mg total) by mouth every 6 (six) hours as needed (nausea).       Review of patient's allergies indicates:   Allergen Reactions    Dilaudid [hydromorphone] Nausea And Vomiting    Morphine Nausea And Vomiting       Past Medical History:   Diagnosis Date    Acid reflux     Chronic lung disease     COPD (chronic obstructive pulmonary disease)     Depression     Gastric ulcer     Hyperlipidemia     Paralysis of diaphragm     left    PONV (postoperative nausea and vomiting)      Past Surgical History:   Procedure Laterality Date    APPENDECTOMY      BREAST SURGERY      AUGMENTATION     SECTION      COSMETIC SURGERY      FACE LIFT breast reduction    HYSTEROSCOPY  2017    [id number] N/A 3/21/2017    Performed by Alexandra Thompson MD at [facility] OR     Family History     Problem Relation (Age of Onset)    Cancer Mother    Colon cancer Sister    Heart disease Father        Tobacco Use    Smoking status: Former Smoker     Packs/day: 1.00     Years: 29.00     Pack years: 29.00     Types: Cigarettes    Smokeless tobacco: Never Used   Substance and Sexual Activity    Alcohol use: Yes    Drug use: No    Sexual activity: Not Currently     Review of Systems  Objective:     Vital Signs (Most Recent):  Temp: 98 °F (36.7 °C) (19 1400)  Pulse: 93 (19 1804)  Resp: 15 (19 1745)  BP: 127/64 (19 1804)  SpO2: 97 % (19 180) Vital Signs (24h Range):  Temp:  [98 °F (36.7 °C)] 98 °F (36.7 °C)  Pulse:  [66-98] 93  Resp:  [15-16] 15  SpO2:  [95 %-100 %] 97 %  BP: (116-174)/(63-79) 127/64     Weight: 65.8 kg (145 lb)  Body mass index is 26.52 kg/m².    No intake or output data in the 24 hours ending 19    Physical Exam  Gen NAD  Non-focal neuro exam  GCS 15, confusion reported seems to be resolving  Small  hematoma in the left maxilla with abrasion  Left arm pain with ROM  Chest: no crepitus  Abd soft and non-tender  Ext: Well perfused  Pelvis stable    Significant Labs:  CBC:   Recent Labs   Lab 06/30/19  1403   WBC 11.43   RBC 4.43   HGB 13.5   HCT 41.1   *   MCV 93   MCH 30.5   MCHC 32.8     CMP:   Recent Labs   Lab 06/30/19  1403   *   CALCIUM 9.9   ALBUMIN 4.0   PROT 7.6   *   K 4.6   CO2 19*      BUN 9   CREATININE 0.8   ALKPHOS 81   ALT 23   AST 34   BILITOT 0.6     Lactic Acid: No results for input(s): LACTATE in the last 48 hours.    Significant Diagnostics:  Imaging reviewed: CT neck negative. Max face negative  CXR negative.  KUB negative.  Ext films per ortho  CT and MR head reviewed: Follow up films stable    Assessment/Plan:     Active Diagnoses:    Diagnosis Date Noted POA    Concussion [S06.0X9A] 06/30/2019 Unknown    Aphasia [R47.01] 06/30/2019 Unknown    Concussion wth loss of consciousness of 30 minutes or less [S06.0X1A] 06/30/2019 Yes      Problems Resolved During this Admission:     67 y/o F with fall from ladder, trace intraventricular bleed stable on repeat imaging.  Humeral fracture is non-op per ortho    Will admit to surgery for observation and neuro checks  No general surgical needs currently  Repeat CXR in am- looks at baseline currently  Diet ok  PO pain control  Dispo: If labs and imaging stable, likely DC tomorrow    Thank you for your consult. Admitted    Sukh Arias MD  General Surgery  [facility]      I have personally performed a detailed history and physical examination on this patient. My findings are summarized in the resident's note included in the record.

## 2019-07-01 NOTE — PROGRESS NOTES
[facility]  General Surgery  Progress Note    Subjective:     History of Present Illness:  No notes on file    Post-Op Info:  * No surgery found *         Interval History: no acute events overnight. Pain well controlled.     Medications:  Continuous Infusions:  Scheduled Meds:   clorazepate  7.5 mg Oral BID    escitalopram oxalate  10 mg Oral Daily    pantoprazole  40 mg Oral Daily     PRN Meds:acetaminophen, acetaminophen, calcium carbonate, ondansetron, oxyCODONE-acetaminophen, promethazine (PHENERGAN) IVPB, sodium chloride 0.9%     Review of patient's allergies indicates:   Allergen Reactions    Dilaudid [hydromorphone] Nausea And Vomiting    Morphine Nausea And Vomiting     Objective:     Vital Signs (Most Recent):  Temp: 98.2 °F (36.8 °C) (07/01/19 0359)  Pulse: 82 (07/01/19 0359)  Resp: 16 (06/30/19 2224)  BP: 100/61 (07/01/19 0359)  SpO2: (!) 93 % (07/01/19 0359) Vital Signs (24h Range):  Temp:  [98 °F (36.7 °C)-99.5 °F (37.5 °C)] 98.2 °F (36.8 °C)  Pulse:  [] 82  Resp:  [15-16] 16  SpO2:  [93 %-100 %] 93 %  BP: ()/(47-79) 100/61     Weight: 65.8 kg (145 lb)  Body mass index is 26.52 kg/m².    Intake/Output - Last 3 Shifts       06/29 0700 - 06/30 0659 06/30 0700 - 07/01 0659 07/01 0700 - 07/02 0659    P.O.  240     Total Intake(mL/kg)  240 (3.6)     Urine (mL/kg/hr)  350     Emesis/NG output  0     Other  0     Stool  0     Blood  0     Total Output  350     Net  -110            Urine Occurrence  1 x     Stool Occurrence  0 x     Emesis Occurrence  0 x           Physical Exam   Cardiovascular: Normal rate.   Pulmonary/Chest: Effort normal.   Abdominal: Soft. There is no tenderness.   Neurological: She is alert.   Skin: Skin is warm and dry.   Nursing note and vitals reviewed.      Significant Labs:  CBC:   Recent Labs   Lab 07/01/19  0436   WBC 13.17*   RBC 4.29   HGB 12.8   HCT 38.8   *   MCV 90   MCH 29.8   MCHC 33.0     CMP:   Recent Labs   Lab 07/01/19  0435    GLU 88   CALCIUM 9.8   ALBUMIN 4.0   PROT 7.2      K 4.4   CO2 26      BUN 7*   CREATININE 0.8   ALKPHOS 81   ALT 22   AST 24   BILITOT 0.8     Significant Diagnostics:  I have reviewed all pertinent imaging results/findings within the past 24 hours.    Assessment/Plan:     Concussion  67 y/o F with fall from ladder, trace intraventricular bleed stable on repeat imaging.  Humeral fracture is non-op per ortho. Stable chest x-ray HOD 1.     -plan for discharge  -no need for neurosurgery follow up  -will wear sling for humeral fracture and follow up with x-rays          John Mills MD  General Surgery  [facility]

## 2019-07-01 NOTE — HPI
History of Present Illness: 67 y/o F who was on an 8 ft ladder and fell at about 1pm. Pt was confused after.  She was evaluated by NSGY. Ortho, and Neuro CC.  Pt has an MRI with a trace intraventricular bleed, pending follow up imaging.  Pt complains of left arm,should and chest pain.       Notable hx of COPD and paralyzed left diaphragm.

## 2019-07-01 NOTE — TELEPHONE ENCOUNTER
----- Message from Soheila Burgess sent at 7/1/2019 10:54 AM CDT -----  Contact: Self   Pt calling to speak to a nurse regarding scheduling appt. [phone]

## 2019-07-01 NOTE — TELEPHONE ENCOUNTER
Called patient in regard to phone message received. Left a detailed message along with a call back number so we can discuss.

## 2019-07-01 NOTE — TELEPHONE ENCOUNTER
----- Message from Kadie Mikeza sent at 7/1/2019 10:50 AM CDT -----  Contact: self [phone]  Please call her to schedule a hospital follow appt  for concussion, discharged from [facility] 7/1/19 for 2 weeks.  Your first available was 7/30.  Thank you!

## 2019-07-01 NOTE — CONSULTS
[facility]  Neurocritical Care  Consult    Admit Date: 2019  Service Date: 2019  Length of Stay: 0    Subjective:     Chief Complaint: Concussion     History of Present Illness: 68 Y.O F with PMHx of COPD, hyperlipidemia & anxiety presented to the ED after fall from a ladder. Patient is confused about the details of her injury however remembers being on an  8 foot ladder. Per family, patient called them this AM, stating that she has scraps on her body but doesn't know what happened. They went to her house and found an open ladder on its side in the front on the house which seemed like she fell off the ladder. They went into the house to find the patient in her room with bruises on her Lt side, face and body. Patient is not on any antiplatelets/anticoagulation, she has been confused about the details of what happened, sleepy at times, however family denied any seizure like activity, patient denied any palpitations or dizziness on standing.   Of note, patient slipped and fell in the bathroom last month, might have hit her head at that time, but denied LOC.      Past Medical History:   Diagnosis Date    Acid reflux     Chronic lung disease     COPD (chronic obstructive pulmonary disease)     Depression     Gastric ulcer     Hyperlipidemia     Paralysis of diaphragm     left    PONV (postoperative nausea and vomiting)      Past Surgical History:   Procedure Laterality Date    APPENDECTOMY      BREAST SURGERY      AUGMENTATION     SECTION      COSMETIC SURGERY      FACE LIFT breast reduction    HYSTEROSCOPY  2017    [id number] N/A 3/21/2017    Performed by Alexandra Thompson MD at [facility] OR      No current facility-administered medications on file prior to encounter.      Current Outpatient Medications on File Prior to Encounter   Medication Sig Dispense Refill    albuterol (VENTOLIN HFA) 90 mcg/actuation inhaler Inhale 2 puffs into the lungs every 4  (four) hours as needed for Wheezing or Shortness of Breath. Rescue 18 g 5    albuterol-ipratropium (DUO-NEB) 2.5 mg-0.5 mg/3 mL nebulizer solution Take 3 mLs by nebulization every 6 (six) hours as needed for Wheezing or Shortness of Breath. Rescue 150 mL 5    BREO ELLIPTA 100-25 mcg/dose diskus inhaler Inhale 1 puff into the lungs once daily. 60 each 5    cetirizine 10 mg chewable tablet Take 10 mg by mouth once daily.      clorazepate (TRANXENE) 7.5 MG Tab TAKE 1 TABLET BY MOUTH TWICE DAILY 60 tablet 3    escitalopram oxalate (LEXAPRO) 10 MG tablet Take 1 tablet (10 mg total) by mouth once daily. 90 tablet 3    esomeprazole (NEXIUM) 20 MG capsule Take 20 mg by mouth before breakfast.      fluticasone (FLONASE) 50 mcg/actuation nasal spray 2 sprays (100 mcg total) by Each Nare route once daily. 1 Bottle 0    MULTIVITS-MIN/IRON/FA/LUTEIN (ULTIMATE WOMEN'S COMPLETE 50+ ORAL) Take by mouth.      estradiol (ESTRACE) 0.01 % (0.1 mg/gram) vaginal cream 0.5 g nightly x 2 weeks, then 2x/week thereafter. 42.5 g 11    estradiol (ESTRACE) 0.01 % (0.1 mg/gram) vaginal cream Use 0.5 gram of estrogen cream in vagina nightly x 2 weeks, then twice a week thereafter 42.5 g 3    fluticasone-vilanterol (BREO ELLIPTA) 200-25 mcg/dose DsDv diskus inhaler Inhale 1 puff into the lungs once daily. Controller 1 each 5    mirabegron (MYRBETRIQ) 50 mg Tb24 Take 1 tablet (50 mg total) by mouth once daily. 30 tablet 11    ondansetron (ZOFRAN-ODT) 4 MG TbDL Take 1 tablet (4 mg total) by mouth every 6 (six) hours as needed (nausea). 20 tablet 1      Allergies: Dilaudid [hydromorphone] and Morphine    Family History   Problem Relation Age of Onset    Cancer Mother     Heart disease Father     Colon cancer Sister     Cervical cancer Neg Hx     Endometrial cancer Neg Hx     Vaginal cancer Neg Hx     Breast cancer Neg Hx     Ovarian cancer Neg Hx      Social History     Tobacco Use    Smoking status: Former Smoker     Packs/day:  1.00     Years: 29.00     Pack years: 29.00     Types: Cigarettes    Smokeless tobacco: Never Used   Substance Use Topics    Alcohol use: Yes    Drug use: No     Review of Systems   Constitutional: Negative for fatigue and fever.   HENT: Positive for facial swelling. Negative for trouble swallowing.    Eyes: Negative for pain and visual disturbance.   Respiratory: Negative for shortness of breath and wheezing.    Cardiovascular: Negative for chest pain and palpitations.   Gastrointestinal: Negative for nausea and vomiting.   Genitourinary: Negative for flank pain and frequency.   Musculoskeletal: Positive for arthralgias and back pain.   Skin: Positive for wound. Negative for pallor.   Neurological: Positive for speech difficulty and headaches. Negative for tremors and weakness.   Psychiatric/Behavioral: Positive for confusion. Negative for agitation and behavioral problems.     Objective:     Vitals:    Temp: 98 °F (36.7 °C)  Pulse: 83  BP: 137/65  MAP (mmHg): 94  Resp: 16  SpO2: 100 %  O2 Device (Oxygen Therapy): room air    Temp  Min: 98 °F (36.7 °C)  Max: 98 °F (36.7 °C)  Pulse  Min: 79  Max: 98  BP  Min: 116/73  Max: 174/74  MAP (mmHg)  Min: 87  Max: 106  Resp  Min: 16  Max: 16  SpO2  Min: 97 %  Max: 100 %    No intake/output data recorded.       Physical Exam  --sedation: none  --GCS: [id number]  --Mental Status: Alert oriented to time, person and place, appeared confused about what happened, with some expressive aphasia. Follows commands   --CN II-XII grossly intact.   --Pupils 3-->2mm, PERRL.   --brainstem: intact  --Motor: moves all extremities but LUE limitid by severe pain  --sensory: intact with severe pain in LUE  --Reflexes: not tested  --Gait: deferred      Today I personally reviewed pertinent medications, lines/drains/airways, imaging, cardiology results, laboratory results, microbiology results, notably:      Assessment/Plan:     Neuro  Concussion  Patient with some aphasia & confusion about what  happened  CTH with no bleed.  MRI with trace intraventricular bleed  Patient protecting airway, stable  No neurocritical needs at this time  If concern for IVH consult NSGY  Please feel free to contact us if you have any further questions or concerns        Activity Orders          None        No Order    Darryl Zambrano MD  Neurocritical Care  [facility]

## 2019-07-01 NOTE — PROGRESS NOTES
Patient being discharged to home. Patient discharge education completed and patient and daughter verbalized understanding. Printed copy of prescriptions given to patient. PIV removed with catheter tip intact. Patient is AAOx4, remains free of falls with no distress noted.  Patient awaiting transport.

## 2019-07-01 NOTE — DISCHARGE SUMMARY
[facility]  General Surgery  Discharge Summary      Patient Name: Concha Hess  MRN: [id number]  Admission Date: 6/30/2019  Hospital Length of Stay: 1 days  Discharge Date and Time:  07/01/2019 7:39 AM  Attending Physician: Taras Perez MD   Discharging Provider: John Mills MD  Primary Care Provider: John Vinson MD    HPI:   History of Present Illness: 67 y/o F who was on an 8 ft ladder and fell at about 1pm. Pt was confused after.  She was evaluated by NSGY. Ortho, and Neuro CC.  Pt has an MRI with a trace intraventricular bleed, pending follow up imaging.  Pt complains of left arm,should and chest pain.       Notable hx of COPD and paralyzed left diaphragm.        * No surgery found *      Indwelling Lines/Drains at time of discharge:   Lines/Drains/Airways     Drain            Female External Urinary Catheter 06/30/19 2230 less than 1 day              Hospital Course: Fall from 8 foot ladder with concussive symptoms. Evaluated by NSGY, ortho, and neuro cc. Ortho recommends non-op treatment of humeral head fracture. NSGY notes nothing to do for trace IVH. Chest x-ray appears stable HOD 1. Ready for discharge. Will follow up with ortho.     Consults:   Consults (From admission, onward)        Status Ordering Provider     Inpatient consult to General surgery  Once     Provider:  (Not yet assigned)    Completed NATALI CHANEL     Inpatient consult to Neuro ICU  Once     Provider:  (Not yet assigned)    Completed ROSARIO SHAY          Significant Diagnostic Studies: Labs:   CMP   Recent Labs   Lab 06/30/19  1403 07/01/19  0434   * 139   K 4.6 4.4    103   CO2 19* 26   * 88   BUN 9 7*   CREATININE 0.8 0.8   CALCIUM 9.9 9.8   PROT 7.6 7.2   ALBUMIN 4.0 4.0   BILITOT 0.6 0.8   ALKPHOS 81 81   AST 34 24   ALT 23 22   ANIONGAP 15 10   ESTGFRAFRICA >60.0 >60.0   EGFRNONAA >60.0 >60.0    and CBC   Recent Labs   Lab 06/30/19  1403 07/01/19  0436   WBC 11.43 13.17*   HGB  13.5 12.8   HCT 41.1 38.8   * 363*       Pending Diagnostic Studies:     Procedure Component Value Units Date/Time    CT 3D RECON WITH INDEPENDENT WS [[id number]] Resulted:  06/30/19 2109    Order Status:  Sent Lab Status:  In process Updated:  06/30/19 2115        Final Active Diagnoses:    Diagnosis Date Noted POA    PRINCIPAL PROBLEM:  Concussion [S06.0X9A] 06/30/2019 Unknown     Chronic    Aphasia [R47.01] 06/30/2019 Unknown    Concussion wth loss of consciousness of 30 minutes or less [S06.0X1A] 06/30/2019 Yes      Problems Resolved During this Admission:      Discharged Condition: good    Disposition: Home or Self Care    Follow Up:  Follow-up Information     Derick Cummins MD. Schedule an appointment as soon as possible for a visit in 2 weeks.    Specialties:  Hand Surgery, Orthopedic Surgery  Why:  For x-rays of shoulder and follow up eval.   Contact information:  [address] [id number] [phone]                 Patient Instructions:      Diet Adult Regular     Notify your health care provider if you experience any of the following:  temperature >100.4     Notify your health care provider if you experience any of the following:  persistent nausea and vomiting or diarrhea     Notify your health care provider if you experience any of the following:  severe uncontrolled pain     Notify your health care provider if you experience any of the following:  redness, tenderness, or signs of infection (pain, swelling, redness, odor or green/yellow discharge around incision site)     Notify your health care provider if you experience any of the following:  difficulty breathing or increased cough     Notify your health care provider if you experience any of the following:  severe persistent headache     Notify your health care provider if you experience any of the following:  worsening rash     Notify your health care provider if you experience any of the following:  persistent  dizziness, light-headedness, or visual disturbances     Notify your health care provider if you experience any of the following:  increased confusion or weakness     Activity as tolerated   Order Comments: No driving while on narcotics and until you can react quickly without pain.     Shower on day dressing removed (No bath)     Medications:  Reconciled Home Medications:      Medication List      START taking these medications    HYDROcodone-acetaminophen 5-325 mg per tablet  Commonly known as:  NORCO  Take 1 tablet by mouth every 6 (six) hours as needed for Pain.     ondansetron 4 MG tablet  Commonly known as:  ZOFRAN  Take 1 tablet (4 mg total) by mouth every 8 (eight) hours as needed for Nausea.        CONTINUE taking these medications    albuterol 90 mcg/actuation inhaler  Commonly known as:  VENTOLIN HFA  Inhale 2 puffs into the lungs every 4 (four) hours as needed for Wheezing or Shortness of Breath. Rescue     albuterol-ipratropium 2.5 mg-0.5 mg/3 mL nebulizer solution  Commonly known as:  DUO-NEB  Take 3 mLs by nebulization every 6 (six) hours as needed for Wheezing or Shortness of Breath. Rescue     * BREO ELLIPTA 100-25 mcg/dose diskus inhaler  Generic drug:  fluticasone furoate-vilanterol  Inhale 1 puff into the lungs once daily.     * fluticasone furoate-vilanterol 200-25 mcg/dose Dsdv diskus inhaler  Commonly known as:  BREO ELLIPTA  Inhale 1 puff into the lungs once daily. Controller     cetirizine 10 mg chewable tablet  Take 10 mg by mouth once daily.     clorazepate 7.5 MG Tab  Commonly known as:  TRANXENE  TAKE 1 TABLET BY MOUTH TWICE DAILY     escitalopram oxalate 10 MG tablet  Commonly known as:  LEXAPRO  Take 1 tablet (10 mg total) by mouth once daily.     esomeprazole 20 MG capsule  Commonly known as:  NEXIUM  Take 20 mg by mouth before breakfast.     * estradiol 0.01 % (0.1 mg/gram) vaginal cream  Commonly known as:  ESTRACE  0.5 g nightly x 2 weeks, then 2x/week thereafter.     * estradiol  0.01 % (0.1 mg/gram) vaginal cream  Commonly known as:  ESTRACE  Use 0.5 gram of estrogen cream in vagina nightly x 2 weeks, then twice a week thereafter     fluticasone propionate 50 mcg/actuation nasal spray  Commonly known as:  FLONASE  2 sprays (100 mcg total) by Each Nare route once daily.     mirabegron 50 mg Tb24  Commonly known as:  MYRBETRIQ  Take 1 tablet (50 mg total) by mouth once daily.     ondansetron 4 MG Tbdl  Commonly known as:  ZOFRAN-ODT  Take 1 tablet (4 mg total) by mouth every 6 (six) hours as needed (nausea).     ULTIMATE WOMEN'S COMPLETE 50+ ORAL  Take by mouth.         * This list has 4 medication(s) that are the same as other medications prescribed for you. Read the directions carefully, and ask your doctor or other care provider to review them with you.              Time spent on the discharge of patient: 30 minutes    John Mills MD  General Surgery  [facility]

## 2019-07-01 NOTE — ED PROVIDER NOTES
ED Physician Hand-off Note:    ED Course: I assumed care of patient from off-going ED physician team. Briefly, Patient is a 68 year old female with a history of COPD, chronic lung disease, hyperlipidemia, acid reflux presenting with fall from a ladder approximately 8 ft this a.m..  On arrival she was confused, disoriented with concern for traumatic brain injury. Complaining of left arm pain, with imaging confirming left humerus fracture nondisplaced.  Clear from orthopedic standpoint, will need sling but no operative intervention.  Initial head CT with no acute findings.  Given concern for TBI and ongoing confusion/decline in mental status MRI was obtained with evidence of intraventricular bleed, trace amount. .    At the time of signout plan was pending MRI of the brain and Neuro Critical Care evaluation.    MRI positive for trace intraventricular bleed.  Discussed case with Neuro Critical Care, who feels patient is safe for the floor given the small amount of intraventricular bleed.  However, patient still appears confused is at times.  Discussed case with Neurosurgery who recommended repeat head CT at the 6 hr arden.  If CT head stable, would recommend admission to General surgery given evidence of polytrauma for observation.  Discussed case with hospital medicine team, who feel patient would be better suited for a General surgery evaluation given poly trauma with frequent neuro checks.     I re-evaluated the patient at bedside, she has periods of confusion however her postconcussion seems to be clearing somewhat.  She is hemodynamically stable, is alert and oriented x2, but asked repeated questions to answers that were already given to in the conversation.  Repeat CT head obtained with stable findings but evidence of trace intraventricular bleed.    After discussion with General surgery, they will admit to observation, with 3 hr neuro checks and close follow-up.    Medications given in the ED:    Medications    clorazepate tablet 7.5 mg (has no administration in time range)   escitalopram oxalate tablet 10 mg (has no administration in time range)   pantoprazole EC tablet 40 mg (has no administration in time range)   ondansetron disintegrating tablet 4 mg (has no administration in time range)   sodium chloride 0.9% flush 10 mL (has no administration in time range)   acetaminophen tablet 650 mg (has no administration in time range)   acetaminophen tablet 650 mg (has no administration in time range)   oxyCODONE-acetaminophen 5-325 mg per tablet 1 tablet (has no administration in time range)   ondansetron injection 4 mg (4 mg Intravenous Given 6/30/19 1511)       Disposition:  Observation    Patient comfortable with observation.. Patient counseled regarding exam, results, diagnosis, treatment, and plan.    Impression:   Final diagnoses:  [M25.512] Left shoulder pain  [M79.622] Pain of left humerus  [M25.522] Left elbow pain  [R06.02] SOB (shortness of breath)  [W19.XXXA] Fall  [S06.0X1A] Concussion with loss of consciousness of 30 minutes or less, initial encounter (Primary)  [S06.9X1A] Traumatic brain injury, with loss of consciousness of 30 minutes or less, initial encounter  [S06.309A] Traumatic intraventricular hemorrhage with loss of consciousness, initial encounter    William Soto DO  Dept of Emergency Medicine   [facility]  Spectralink: [id number]         William Soto DO  06/30/19 1956

## 2019-07-01 NOTE — PLAN OF CARE
07/01/19 1230   Final Note   Assessment Type Final Discharge Note   Anticipated Discharge Disposition Home   What phone number can be called within the next 1-3 days to see how you are doing after discharge?   ([phone])   Hospital Follow Up  Appt(s) scheduled? Yes   Discharge plans and expectations educations in teach back method with documentation complete? Yes   Right Care Referral Info   Post Acute Recommendation No Care

## 2019-07-01 NOTE — DISCHARGE INSTRUCTIONS
You can wear a sling for your shoulder for comfort for 1 week and then start trying to do light exercises with your shoulder until you can be seen again by orthopedic surgery at 2 weeks from discharge.

## 2019-07-01 NOTE — CONSULTS
Orthopaedic Surgery  Consult Note    Concha Hess  2019      HPI: Concha Hess is a 68 y.o. female PMHx of COPD, hyperlipidemia & anxiety presented to the ED after fall from a ladder about 8 ft high.  Patient fell and lost consciousness she felt immediate pain in her head and also in her left shoulder with difficulty moving the left shoulder.  Imaging found a left minimally displaced greater tuberosity fracture.  Patient denies numbness tingling or pain anywhere other than head and left shoulder.      Past Medical History:   Diagnosis Date    Acid reflux     Chronic lung disease     COPD (chronic obstructive pulmonary disease)     Depression     Gastric ulcer     Hyperlipidemia     Paralysis of diaphragm     left    PONV (postoperative nausea and vomiting)      Past Surgical History:   Procedure Laterality Date    APPENDECTOMY      BREAST SURGERY      AUGMENTATION     SECTION      COSMETIC SURGERY      FACE LIFT breast reduction    HYSTEROSCOPY  2017    [id number] N/A 3/21/2017    Performed by Alexandra Thompson MD at [facility] OR     Family History   Problem Relation Age of Onset    Cancer Mother     Heart disease Father     Colon cancer Sister     Cervical cancer Neg Hx     Endometrial cancer Neg Hx     Vaginal cancer Neg Hx     Breast cancer Neg Hx     Ovarian cancer Neg Hx      Social History     Socioeconomic History    Marital status:      Spouse name: Not on file    Number of children: Not on file    Years of education: Not on file    Highest education level: Not on file   Occupational History    Not on file   Social Needs    Financial resource strain: Not on file    Food insecurity:     Worry: Not on file     Inability: Not on file    Transportation needs:     Medical: Not on file     Non-medical: Not on file   Tobacco Use    Smoking status: Former Smoker     Packs/day: 1.00     Years: 29.00     Pack years: 29.00     Types:  Cigarettes    Smokeless tobacco: Never Used   Substance and Sexual Activity    Alcohol use: Yes    Drug use: No    Sexual activity: Not Currently   Lifestyle    Physical activity:     Days per week: Not on file     Minutes per session: Not on file    Stress: Not on file   Relationships    Social connections:     Talks on phone: Not on file     Gets together: Not on file     Attends [employer] service: Not on file     Active member of club or organization: Not on file     Attends meetings of clubs or organizations: Not on file     Relationship status: Not on file   Other Topics Concern    Not on file   Social History Narrative    Not on file     No current facility-administered medications on file prior to encounter.      Current Outpatient Medications on File Prior to Encounter   Medication Sig    albuterol (VENTOLIN HFA) 90 mcg/actuation inhaler Inhale 2 puffs into the lungs every 4 (four) hours as needed for Wheezing or Shortness of Breath. Rescue    albuterol-ipratropium (DUO-NEB) 2.5 mg-0.5 mg/3 mL nebulizer solution Take 3 mLs by nebulization every 6 (six) hours as needed for Wheezing or Shortness of Breath. Rescue    BREO ELLIPTA 100-25 mcg/dose diskus inhaler Inhale 1 puff into the lungs once daily.    cetirizine 10 mg chewable tablet Take 10 mg by mouth once daily.    clorazepate (TRANXENE) 7.5 MG Tab TAKE 1 TABLET BY MOUTH TWICE DAILY    escitalopram oxalate (LEXAPRO) 10 MG tablet Take 1 tablet (10 mg total) by mouth once daily.    esomeprazole (NEXIUM) 20 MG capsule Take 20 mg by mouth before breakfast.    fluticasone (FLONASE) 50 mcg/actuation nasal spray 2 sprays (100 mcg total) by Each Nare route once daily.    MULTIVITS-MIN/IRON/FA/LUTEIN (ULTIMATE WOMEN'S COMPLETE 50+ ORAL) Take by mouth.    estradiol (ESTRACE) 0.01 % (0.1 mg/gram) vaginal cream 0.5 g nightly x 2 weeks, then 2x/week thereafter.    estradiol (ESTRACE) 0.01 % (0.1 mg/gram) vaginal cream Use 0.5 gram of estrogen cream  in vagina nightly x 2 weeks, then twice a week thereafter    fluticasone-vilanterol (BREO ELLIPTA) 200-25 mcg/dose DsDv diskus inhaler Inhale 1 puff into the lungs once daily. Controller    mirabegron (MYRBETRIQ) 50 mg Tb24 Take 1 tablet (50 mg total) by mouth once daily.    ondansetron (ZOFRAN-ODT) 4 MG TbDL Take 1 tablet (4 mg total) by mouth every 6 (six) hours as needed (nausea).       Review of Systems:    Patient denies constitutional symptoms, cardiac symptoms, respiratory symptoms, GI symptoms, psychiatric symptoms, endocrine related symptoms.  The remainder of the musculoskeletal ROS is included in the HPI.    Physical Exam:    Temp:  [98 °F (36.7 °C)] 98 °F (36.7 °C)  Pulse:  [66-98] 91  Resp:  [15-16] 15  SpO2:  [95 %-100 %] 97 %  BP: (116-174)/(61-79) 119/61    PE:    AA&O x 4.  NAD  HEENT:  Normocephalic but multiple ecchymosis particularly on the left side of the face  Resp: no increased work of breathing  CV: regular rate and rhythm  MSK: moves b/l upper extremities well    left upper extremity:  Skin intact  There is significant swelling of the left shoulder minimal ecchymosis  She is significantly tender to palpation at the left shoulder but otherwise not tender to palpation at left elbow wrist  Sensation intact in M/U/R nerve distributions  Motor intact AIN/PIN/U/R nerves  2+ DR pulse    left lower and right lower extremity:  Skin intact  no ecchymoses, erythema, or swelling  Sensation intact SP/DP/T/Sural/Saphenous nerves  Motor intact EHL/FHL/TA/gastroc  No TTP  Palpable DP/PT pulses      Diagnostic Results: radiographs of the left shoulder show minimally displaced greater tuberosity fracture    A/P:  68 y.o. female status post a fall from 8 ft on a ladder who sustained a greater tuberosity fracture of her left humerus.    -Discussed with the patient extensively about the different management options both conservative and surgical options.  Based on imaging left glenohumeral joint is reduced  left greater tuberosity fracture is minimally displaced.  At this time this appears to be able to be treated non operatively patient can wear his sling for comfort for a week then may begin gentle range-of-motion exercises will take x-rays in 2 weeks to make sure the tuberosity fragment has not displaced significantly.      Darryl Briones MD PGY-2  Orthopaedic Surgery Resident

## 2019-07-01 NOTE — PLAN OF CARE
Unable to complete discharge planning assessment due to patient has already discharged to home, without needs.        07/01/19 1230   Discharge Assessment   Assessment Type Discharge Planning Assessment   Confirmed/corrected address and phone number on facesheet? No   Assessment information obtained from? Medical Record   Expected Length of Stay (days)   (1)   Communicated expected length of stay with patient/caregiver no  (Per MD)   Prior to hospitilization cognitive status: Alert/Oriented;No Deficits   Current cognitive status: Alert/Oriented;No Deficits   Able to Return to Prior Arrangements yes   Is patient able to care for self after discharge? Yes   Who are your caregiver(s) and their phone number(s)?   (Catia Hess Daughter     [phone]   )   Patient's perception of discharge disposition home or selfcare   Does the patient have transportation home? Yes   Transportation Anticipated family or friend will provide   Discharge Plan A Home with family   Discharge Plan B Home with family   DME Needed Upon Discharge  none   Patient/Family in Agreement with Plan yes  (Per floor nurse & MD. )

## 2019-07-01 NOTE — ASSESSMENT & PLAN NOTE
69 y/o F with fall from ladder, trace intraventricular bleed stable on repeat imaging.  Humeral fracture is non-op per ortho. Stable chest x-ray HOD 1.     -plan for discharge  -no need for neurosurgery follow up  -will wear sling for humeral fracture and follow up with x-rays

## 2019-07-01 NOTE — HOSPITAL COURSE
Fall from 8 foot ladder with concussive symptoms. Evaluated by NSGY, ortho, and neuro cc. Ortho recommends non-op treatment of humeral head fracture. NSGY notes nothing to do for trace IVH. Chest x-ray appears stable HOD 1. Ready for discharge. Will follow up with ortho.

## 2019-07-01 NOTE — PLAN OF CARE
Problem: Adult Inpatient Plan of Care  Goal: Plan of Care Review  Outcome: Ongoing (interventions implemented as appropriate)  POC discussed with pt. And verbalized understanding. AAOx4, VSS on RA and telemetry in place with NSR. Bruising and scrapes to the L eye. Sling in place to the L extremity, no numbness or tingling reported, and cap refill <3sec. Regular diet tolerated, nausea reported and treated with PRN medications. Ambulates with stand by assistance, and voids via the urinal. Remains free of falls and injury. Safety precautions maintained, call light within reach, will continue to monitor.

## 2019-07-03 ENCOUNTER — TELEPHONE (OUTPATIENT)
Dept: SPORTS MEDICINE | Facility: CLINIC | Age: 69
End: 2019-07-03

## 2019-07-03 NOTE — TELEPHONE ENCOUNTER
----- Message from Shanae Sosa sent at 7/3/2019 10:03 AM CDT -----  Contact: Daughter  Pt had a fall off a later and fractured her left shoulder and is wanting to be seen by Dr Victoria, pt is new to clinic   Ctaia can be reached @ [phone]

## 2019-07-03 NOTE — TELEPHONE ENCOUNTER
JAYDAM informing the patient that  is out of the office this week. Patient was also advised that a sooner appointment can be made with a PA if she would like to be seen soonner

## 2019-07-05 ENCOUNTER — OFFICE VISIT (OUTPATIENT)
Dept: PRIMARY CARE CLINIC | Facility: CLINIC | Age: 69
End: 2019-07-05
Payer: MEDICARE

## 2019-07-05 ENCOUNTER — CLINICAL SUPPORT (OUTPATIENT)
Dept: PRIMARY CARE CLINIC | Facility: CLINIC | Age: 69
End: 2019-07-05
Payer: MEDICARE

## 2019-07-05 VITALS
WEIGHT: 149 LBS | OXYGEN SATURATION: 94 % | SYSTOLIC BLOOD PRESSURE: 135 MMHG | HEART RATE: 73 BPM | HEIGHT: 62 IN | TEMPERATURE: 98 F | RESPIRATION RATE: 18 BRPM | BODY MASS INDEX: 27.42 KG/M2 | DIASTOLIC BLOOD PRESSURE: 78 MMHG

## 2019-07-05 DIAGNOSIS — D72.829 LEUKOCYTOSIS, UNSPECIFIED TYPE: ICD-10-CM

## 2019-07-05 DIAGNOSIS — E78.5 HYPERLIPIDEMIA, UNSPECIFIED HYPERLIPIDEMIA TYPE: ICD-10-CM

## 2019-07-05 DIAGNOSIS — F07.81 POST CONCUSSION SYNDROME: ICD-10-CM

## 2019-07-05 DIAGNOSIS — E55.9 VITAMIN D DEFICIENCY: ICD-10-CM

## 2019-07-05 DIAGNOSIS — I61.5 IVH (INTRAVENTRICULAR HEMORRHAGE): Primary | ICD-10-CM

## 2019-07-05 DIAGNOSIS — N39.0 UTI DUE TO KLEBSIELLA SPECIES: ICD-10-CM

## 2019-07-05 DIAGNOSIS — S42.292D CLOSED FRACTURE OF HEAD OF LEFT HUMERUS WITH ROUTINE HEALING, SUBSEQUENT ENCOUNTER: ICD-10-CM

## 2019-07-05 DIAGNOSIS — B96.89 UTI DUE TO KLEBSIELLA SPECIES: ICD-10-CM

## 2019-07-05 PROBLEM — R47.01 APHASIA: Status: RESOLVED | Noted: 2019-06-30 | Resolved: 2019-07-05

## 2019-07-05 LAB
BILIRUB SERPL-MCNC: NORMAL MG/DL
BLOOD URINE, POC: NORMAL
COLOR, POC UA: YELLOW
GLUCOSE UR QL STRIP: NORMAL
KETONES UR QL STRIP: NORMAL
LEUKOCYTE ESTERASE URINE, POC: NORMAL
NITRITE, POC UA: NORMAL
PH, POC UA: 8
PROTEIN, POC: NORMAL
SPECIFIC GRAVITY, POC UA: 1
UROBILINOGEN, POC UA: NORMAL

## 2019-07-05 PROCEDURE — 99214 PR OFFICE/OUTPT VISIT, EST, LEVL IV, 30-39 MIN: ICD-10-PCS | Mod: HCNC,25,S$GLB, | Performed by: FAMILY MEDICINE

## 2019-07-05 PROCEDURE — 99999 PR PBB SHADOW E&M-EST. PATIENT-LVL III: ICD-10-PCS | Mod: PBBFAC,HCNC,, | Performed by: FAMILY MEDICINE

## 2019-07-05 PROCEDURE — 99999 PR PBB SHADOW E&M-EST. PATIENT-LVL III: CPT | Mod: PBBFAC,HCNC,, | Performed by: FAMILY MEDICINE

## 2019-07-05 PROCEDURE — 87086 URINE CULTURE/COLONY COUNT: CPT | Mod: HCNC

## 2019-07-05 PROCEDURE — 87186 SC STD MICRODIL/AGAR DIL: CPT | Mod: HCNC

## 2019-07-05 PROCEDURE — 87088 URINE BACTERIA CULTURE: CPT | Mod: HCNC

## 2019-07-05 PROCEDURE — 81001 POCT URINALYSIS, DIPSTICK OR TABLET REAGENT, AUTOMATED, WITH MICROSCOP: ICD-10-PCS | Mod: HCNC,S$GLB,, | Performed by: FAMILY MEDICINE

## 2019-07-05 PROCEDURE — 81001 URINALYSIS AUTO W/SCOPE: CPT | Mod: HCNC,S$GLB,, | Performed by: FAMILY MEDICINE

## 2019-07-05 PROCEDURE — 87077 CULTURE AEROBIC IDENTIFY: CPT | Mod: HCNC

## 2019-07-05 PROCEDURE — 99214 OFFICE O/P EST MOD 30 MIN: CPT | Mod: HCNC,25,S$GLB, | Performed by: FAMILY MEDICINE

## 2019-07-05 NOTE — PROGRESS NOTES
Subjective:       Patient ID: Concha Hess is a 68 y.o. female.    Chief Complaint: Follow-up (here to follow up since her fall on Saturday off a ladder )    Fell off ladder at home alone last weekend, sustained left-sided head injury and left upper extremity injury with loss of consciousness of unknown duration.  Was taken to the emergency room with altered mental status, admitted for observation for tiny intraventricular hemorrhage with associated concussion, as well as non operative left humeral head fracture.  Since discharge, her mental status has improved considerably.  She is mentating essentially normally, though she does report that for the last day or 2 she has been more emotional than usual.  Headaches initially, have gradually subsided.  No nausea or vomiting.  No balance problems. Left upper arm pain improving.  Has been using a sling, doing gentle range of motion exercises, and has a follow-up appointment with Orthopedic surgery scheduled in about 10 days.   Diagnosed with urinary tract infection by her urogynecologist last month, prescribed Cipro, but she did not take this.  Urine culture grew Klebsiella, sensitive to Cipro.  Review of Systems   Constitutional: Negative for fever.   HENT: Negative for trouble swallowing.    Eyes: Negative for visual disturbance.   Respiratory: Positive for cough. Negative for shortness of breath.    Cardiovascular: Negative for chest pain, palpitations and leg swelling.   Gastrointestinal: Negative for nausea and vomiting.   Genitourinary: Positive for dysuria and frequency. Negative for difficulty urinating and flank pain.   Musculoskeletal: Positive for arthralgias and myalgias.   Skin: Negative for rash.   Allergic/Immunologic: Negative for immunocompromised state.   Neurological: Negative for dizziness, seizures, facial asymmetry, speech difficulty and weakness.   Hematological: Does not bruise/bleed easily.   Psychiatric/Behavioral: Negative for agitation.  "The patient is nervous/anxious.        Objective:      Vitals:    07/05/19 1540   BP: 135/78   BP Location: Right arm   Patient Position: Sitting   BP Method: Medium (Automatic)   Pulse: 73   Resp: 18   Temp: 97.7 °F (36.5 °C)   TempSrc: Oral   SpO2: (!) 94%   Weight: 67.6 kg (149 lb)   Height: 5' 2" (1.575 m)     Physical Exam   Constitutional: She is oriented to person, place, and time. She appears well-developed and well-nourished.   HENT:   Head:       Mouth/Throat: Oropharynx is clear and moist.   Eyes: Pupils are equal, round, and reactive to light. EOM are normal.   Cardiovascular: Normal rate, regular rhythm and normal heart sounds.   Pulses:       Radial pulses are 2+ on the right side, and 2+ on the left side.   Pulmonary/Chest: Effort normal and breath sounds normal.   Musculoskeletal: She exhibits no edema.        Left upper arm: She exhibits tenderness, bony tenderness and swelling.        Arms:  Neurological: She is alert and oriented to person, place, and time. She has normal strength. No cranial nerve deficit or sensory deficit. Coordination and gait normal.   Skin: Skin is warm and dry.   Nursing note and vitals reviewed.      Assessment:       1. IVH (intraventricular hemorrhage)    2. Post concussion syndrome    3. Closed fracture of head of left humerus with routine healing, subsequent encounter    4. Vitamin D deficiency    5. Leukocytosis, unspecified type    6. UTI due to Klebsiella species    7. Hyperlipidemia, unspecified hyperlipidemia type        Plan:       IVH (intraventricular hemorrhage)  Posttraumatic, small, stable, no follow-up needed per Neurosurgery  Post concussion syndrome  Discussed avoidance of excessive neuro stimulation  Closed fracture of head of left humerus with routine healing, subsequent encounter  Follow-up with Orthopedic surgery as scheduled  Vitamin D deficiency  -     Vitamin D; Future; Expected date: 07/05/2019  -     PTH, intact; Future; Expected date: " 07/05/2019    Leukocytosis, unspecified type  -     CBC auto differential; Future; Expected date: 07/05/2019    UTI due to Klebsiella species  -     POCT urinalysis, dipstick or tablet reag  -     Urine culture  Advised to take Cipro as prescribed previously  Hyperlipidemia, unspecified hyperlipidemia type  -     Comprehensive metabolic panel; Future; Expected date: 07/05/2019  -     Lipid panel; Future; Expected date: 07/05/2019      Medication List with Changes/Refills   Current Medications    ALBUTEROL (VENTOLIN HFA) 90 MCG/ACTUATION INHALER    Inhale 2 puffs into the lungs every 4 (four) hours as needed for Wheezing or Shortness of Breath. Rescue    ALBUTEROL-IPRATROPIUM (DUO-NEB) 2.5 MG-0.5 MG/3 ML NEBULIZER SOLUTION    Take 3 mLs by nebulization every 6 (six) hours as needed for Wheezing or Shortness of Breath. Rescue    CETIRIZINE 10 MG CHEWABLE TABLET    Take 10 mg by mouth once daily.    CLORAZEPATE (TRANXENE) 7.5 MG TAB    TAKE 1 TABLET BY MOUTH TWICE DAILY    ESCITALOPRAM OXALATE (LEXAPRO) 10 MG TABLET    Take 1 tablet (10 mg total) by mouth once daily.    ESOMEPRAZOLE (NEXIUM) 20 MG CAPSULE    Take 20 mg by mouth before breakfast.    ESTRADIOL (ESTRACE) 0.01 % (0.1 MG/GRAM) VAGINAL CREAM    Use 0.5 gram of estrogen cream in vagina nightly x 2 weeks, then twice a week thereafter    FLUTICASONE (FLONASE) 50 MCG/ACTUATION NASAL SPRAY    2 sprays (100 mcg total) by Each Nare route once daily.    FLUTICASONE-VILANTEROL (BREO ELLIPTA) 200-25 MCG/DOSE DSDV DISKUS INHALER    Inhale 1 puff into the lungs once daily. Controller    HYDROCODONE-ACETAMINOPHEN (NORCO) 5-325 MG PER TABLET    Take 1 tablet by mouth every 6 (six) hours as needed for Pain.    MIRABEGRON (MYRBETRIQ) 50 MG TB24    Take 1 tablet (50 mg total) by mouth once daily.    MULTIVITS-MIN/IRON/FA/LUTEIN (ULTIMATE WOMEN'S COMPLETE 50+ ORAL)    Take by mouth.    ONDANSETRON (ZOFRAN) 4 MG TABLET    Take 1 tablet (4 mg total) by mouth every 8 (eight)  hours as needed for Nausea.   Discontinued Medications    BREO ELLIPTA 100-25 MCG/DOSE DISKUS INHALER    Inhale 1 puff into the lungs once daily.    ESTRADIOL (ESTRACE) 0.01 % (0.1 MG/GRAM) VAGINAL CREAM    0.5 g nightly x 2 weeks, then 2x/week thereafter.    ONDANSETRON (ZOFRAN-ODT) 4 MG TBDL    Take 1 tablet (4 mg total) by mouth every 6 (six) hours as needed (nausea).

## 2019-07-08 LAB — BACTERIA UR CULT: ABNORMAL

## 2019-07-16 ENCOUNTER — OFFICE VISIT (OUTPATIENT)
Dept: SPORTS MEDICINE | Facility: CLINIC | Age: 69
End: 2019-07-16
Payer: MEDICARE

## 2019-07-16 ENCOUNTER — HOSPITAL ENCOUNTER (OUTPATIENT)
Dept: RADIOLOGY | Facility: HOSPITAL | Age: 69
Discharge: HOME | End: 2019-07-16
Attending: ORTHOPAEDIC SURGERY
Payer: MEDICARE

## 2019-07-16 VITALS — SYSTOLIC BLOOD PRESSURE: 135 MMHG | DIASTOLIC BLOOD PRESSURE: 92 MMHG | HEART RATE: 68 BPM | TEMPERATURE: 97 F

## 2019-07-16 DIAGNOSIS — S42.295A OTHER CLOSED NONDISPLACED FRACTURE OF PROXIMAL END OF LEFT HUMERUS, INITIAL ENCOUNTER: Primary | ICD-10-CM

## 2019-07-16 DIAGNOSIS — M25.512 LEFT SHOULDER PAIN, UNSPECIFIED CHRONICITY: ICD-10-CM

## 2019-07-16 PROCEDURE — 99203 PR OFFICE/OUTPT VISIT, NEW, LEVL III, 30-44 MIN: ICD-10-PCS | Mod: HCNC,S$GLB,, | Performed by: ORTHOPAEDIC SURGERY

## 2019-07-16 PROCEDURE — 1101F PT FALLS ASSESS-DOCD LE1/YR: CPT | Mod: HCNC,CPTII,S$GLB, | Performed by: ORTHOPAEDIC SURGERY

## 2019-07-16 PROCEDURE — 99999 PR PBB SHADOW E&M-EST. PATIENT-LVL III: ICD-10-PCS | Mod: PBBFAC,HCNC,, | Performed by: ORTHOPAEDIC SURGERY

## 2019-07-16 PROCEDURE — 73030 X-RAY EXAM OF SHOULDER: CPT | Mod: TC,HCNC,FY,PO,LT

## 2019-07-16 PROCEDURE — 99203 OFFICE O/P NEW LOW 30 MIN: CPT | Mod: HCNC,S$GLB,, | Performed by: ORTHOPAEDIC SURGERY

## 2019-07-16 PROCEDURE — 73030 X-RAY EXAM OF SHOULDER: CPT | Mod: 26,HCNC,LT, | Performed by: RADIOLOGY

## 2019-07-16 PROCEDURE — 1101F PR PT FALLS ASSESS DOC 0-1 FALLS W/OUT INJ PAST YR: ICD-10-PCS | Mod: HCNC,CPTII,S$GLB, | Performed by: ORTHOPAEDIC SURGERY

## 2019-07-16 PROCEDURE — 99999 PR PBB SHADOW E&M-EST. PATIENT-LVL III: CPT | Mod: PBBFAC,HCNC,, | Performed by: ORTHOPAEDIC SURGERY

## 2019-07-16 PROCEDURE — 73030 XR SHOULDER COMPLETE 2 OR MORE VIEWS LEFT: ICD-10-PCS | Mod: 26,HCNC,LT, | Performed by: RADIOLOGY

## 2019-07-16 NOTE — PROGRESS NOTES
CC: Left shoulder pain     68 y.o. Female presents as a new patient to me. She presents with LEFT shoulder pain x 2 weeks after a fall from a ladder.  She had a positive loss of consciousness and was seen in the emergency department and treated for a intraventricular hemorrhage. Radiographs were obtained demonstrating a greater tuberosity proximal humerus fracture.  Pain localizes to the anterolateral shoulder. Worse with any shoulder motion. Minimal pain at rest.  She has been wearing a sling for the past 2 weeks and working on gentle passive range of motion exercises.  Her pain has improved significantly over the past 2 weeks.  Denies neck pain or radicular symptoms. Treatment thus far has included activity modifications, rest, sling wear, and oral medication.  Here today to discuss diagnosis and treatment options.     RHD    PMHx notable for COPD  Negative for tobacco.   Negative for diabetes.     PAST MEDICAL HISTORY:   Past Medical History:   Diagnosis Date    Acid reflux     Chronic lung disease     COPD (chronic obstructive pulmonary disease)     Depression     Gastric ulcer     Hyperlipidemia     Paralysis of diaphragm     left    PONV (postoperative nausea and vomiting)        PAST SURGICAL HISTORY:  Past Surgical History:   Procedure Laterality Date    APPENDECTOMY      BREAST SURGERY      AUGMENTATION     SECTION      COSMETIC SURGERY      FACE LIFT breast reduction    HYSTEROSCOPY  2017    [id number] N/A 3/21/2017    Performed by Alexandra Thompson MD at [facility]       FAMILY HISTORY:  Family History   Problem Relation Age of Onset    Cancer Mother     Heart disease Father     Colon cancer Sister     Cervical cancer Neg Hx     Endometrial cancer Neg Hx     Vaginal cancer Neg Hx     Breast cancer Neg Hx     Ovarian cancer Neg Hx      MEDICATIONS:    Current Outpatient Medications:     albuterol (VENTOLIN HFA) 90 mcg/actuation inhaler, Inhale 2 puffs into the  lungs every 4 (four) hours as needed for Wheezing or Shortness of Breath. Rescue, Disp: 18 g, Rfl: 5    albuterol-ipratropium (DUO-NEB) 2.5 mg-0.5 mg/3 mL nebulizer solution, Take 3 mLs by nebulization every 6 (six) hours as needed for Wheezing or Shortness of Breath. Rescue, Disp: 150 mL, Rfl: 5    cetirizine 10 mg chewable tablet, Take 10 mg by mouth once daily., Disp: , Rfl:     clorazepate (TRANXENE) 7.5 MG Tab, TAKE 1 TABLET BY MOUTH TWICE DAILY, Disp: 60 tablet, Rfl: 3    escitalopram oxalate (LEXAPRO) 10 MG tablet, Take 1 tablet (10 mg total) by mouth once daily., Disp: 90 tablet, Rfl: 3    esomeprazole (NEXIUM) 20 MG capsule, Take 20 mg by mouth before breakfast., Disp: , Rfl:     estradiol (ESTRACE) 0.01 % (0.1 mg/gram) vaginal cream, Use 0.5 gram of estrogen cream in vagina nightly x 2 weeks, then twice a week thereafter, Disp: 42.5 g, Rfl: 3    fluticasone (FLONASE) 50 mcg/actuation nasal spray, 2 sprays (100 mcg total) by Each Nare route once daily., Disp: 1 Bottle, Rfl: 0    fluticasone-vilanterol (BREO ELLIPTA) 200-25 mcg/dose DsDv diskus inhaler, Inhale 1 puff into the lungs once daily. Controller, Disp: 1 each, Rfl: 5    HYDROcodone-acetaminophen (NORCO) 5-325 mg per tablet, Take 1 tablet by mouth every 6 (six) hours as needed for Pain., Disp: 12 tablet, Rfl: 0    mirabegron (MYRBETRIQ) 50 mg Tb24, Take 1 tablet (50 mg total) by mouth once daily., Disp: 30 tablet, Rfl: 11    MULTIVITS-MIN/IRON/FA/LUTEIN (ULTIMATE WOMEN'S COMPLETE 50+ ORAL), Take by mouth., Disp: , Rfl:     ondansetron (ZOFRAN) 4 MG tablet, Take 1 tablet (4 mg total) by mouth every 8 (eight) hours as needed for Nausea., Disp: 12 tablet, Rfl: 0    ALLERGIES:  Review of patient's allergies indicates:   Allergen Reactions    Dilaudid [hydromorphone] Nausea And Vomiting    Morphine Nausea And Vomiting     REVIEW OF SYSTEMS:  Constitution: Negative. Negative for chills, fever and night sweats.    Hematologic/Lymphatic:  Negative for bleeding problem. Does not bruise/bleed easily.   Skin: Negative for dry skin, itching and rash.   Musculoskeletal: Negative for falls. Positive for left shoulder pain and  muscle weakness.     All other review of symptoms were reviewed and found to be noncontributory.    PHYSICAL EXAMINATION:  Vitals:  BP (!) 135/92   Pulse 68   Temp 97.3 °F (36.3 °C)    General: Well-developed well-nourished 68 y.o. femalein no acute distress   Cardiovascular: Regular rhythm by palpation of distal pulse, normal color and temperature, no concerning varicosities on symptomatic side   Lungs: No labored breathing or wheezing appreciated   Neuro: Alert and oriented ×3   Psychiatric: well oriented to person, place and time, demonstrates normal mood and affect   Skin: No rashes, lesions or ulcers, normal temperature, turgor, and texture on uninvolved extremity     Ortho/SPM Exam  Examination of the left shoulder demonstrates no proximal humerus swelling or ecchymosis.  She is tender to palpation over the greater tuberosity and proximal humerus.  She has pain with attempted active range of motion, passive total elevation to 90°, external rotation to 20°, and internal rotation to sacrum.  Strength not tested.  Nontender over the AC joint or bicipital groove.  Grossly stable     IMAGING:  Xrays including AP, Outlet and Axillary Lateral of Left shoulder are ordered / images reviewed by me:  Recent fracture of the proximal left humerus in the area of the greater tuberosity.  No significant detrimental change since the examinations of 06/30/2019.  There is also a nondisplaced fracture line going through the surgical neck.    CT scan of the shoulder demonstrates:   Mildly comminuted and minimally displaced fracture involving the greater tuberosity of the left proximal humerus.    ASSESSMENT:      ICD-10-CM ICD-9-CM   1. Other closed nondisplaced fracture of proximal end of left humerus, initial encounter S42.295A 812.09   2. Left  shoulder pain, unspecified chronicity M25.512 719.41     Nondisplaced fracture involving the surgical neck, impacted minimally displaced fracture of the greater tuberosity    PLAN:     -Findings and treatment options were discussed with the patient. She has a minimally displaced proximal humerus fracture including the greater tuberosity and a nondisplaced anatomic neck fracture. Recommended conservative management with immobilization.  Recommended sling wear for another 3 weeks, particularly when out in public.  Discussed the risk for possible late fracture displacement. Recommended initiation of physical therapy to start in 1 week.  Passive range of motion only to start. Referral placed for [facility].  Patient will follow up with 1 of the mid-level providers in 4 weeks with repeat radiographs of the shoulder.  -RTC 4 weeks  -All questions answered      Procedures

## 2019-07-25 ENCOUNTER — CLINICAL SUPPORT (OUTPATIENT)
Dept: REHABILITATION | Facility: HOSPITAL | Age: 69
End: 2019-07-25
Payer: MEDICARE

## 2019-07-25 DIAGNOSIS — M25.512 ACUTE PAIN OF LEFT SHOULDER: ICD-10-CM

## 2019-07-25 DIAGNOSIS — M25.612 DECREASED RANGE OF MOTION OF LEFT SHOULDER: ICD-10-CM

## 2019-07-25 DIAGNOSIS — R29.898 WEAKNESS OF LEFT ARM: ICD-10-CM

## 2019-07-25 PROCEDURE — 97161 PT EVAL LOW COMPLEX 20 MIN: CPT | Mod: HCNC

## 2019-07-25 PROCEDURE — 97110 THERAPEUTIC EXERCISES: CPT | Mod: HCNC

## 2019-07-25 NOTE — PLAN OF CARE
[facility]  Physical Therapy Initial Evaluation      Name: Concha THOMPSON [facility] Number: [id number]    Therapy Diagnosis:   Encounter Diagnoses   Name Primary?    Acute pain of left shoulder     Decreased range of motion of left shoulder     Weakness of left arm      Physician: Ye Adam MD    Physician Orders: PT Eval and Treat   Medical Diagnosis from Referral: M25.512 (ICD-10-CM) - Left shoulder pain, unspecified chronicity  Evaluation Date: 7/25/2019  Authorization Period Expiration: 12/31/2019  Plan of Care Expiration: 10/25/2019  Visit # / Visits authorized: 1/ 20    Time In: 0905  Time Out: 1000  Total Billable Time: 55 minutes    Precautions: Standard    Subjective     Date of onset: 6/30/2019  History of current condition - Geraldine reports: she was on a ladder and then fell onto her L side causing a L shoulder non displaced fracture. The pt reports that immediately following her fall she called her daughter but has no recollection of the time prior to the fall to when she woke up in the hospital. The pt also dx with a small brain bleed that has been deemed stable by MD. The pt went to MD who advised her to stay in the sling for another 3 weeks and to begin PT focusing on PROM initially. Will f/u with MD in 3 weeks.      Imaging, X-ray: Recent fracture of the proximal left humerus in the area of the greater tuberosity.  No significant detrimental change since the examinations of 06/30/2019.    Prior Therapy: none  Exercise Routine/Sport Participation: Pilates, 3x/wk.   Social History: lives in a house lives alone  Occupation: retired.  Prior Level of Function: Unrestricted use of L UE  Current Level of Function: restricted use of L UE.     Pain:  Current 0/10, worst 4/10, best 0/10   Location: left shoulder   Description: Aching, Dull and Tight  Aggravating Factors: using L arm, being in sling   Easing Factors: rest    Pts goals: Return to pilates and full use of  SAWYER UE.       Medical History:   Past Medical History:   Diagnosis Date    Acid reflux     Chronic lung disease     COPD (chronic obstructive pulmonary disease)     Depression     Gastric ulcer     Hyperlipidemia     Paralysis of diaphragm     left    PONV (postoperative nausea and vomiting)        Surgical History:   Concha Hess  has a past surgical history that includes  section; Breast surgery; Appendectomy; Cosmetic surgery; and Hysteroscopy (2017).    Medications:   Concha has a current medication list which includes the following prescription(s): albuterol, albuterol-ipratropium, cetirizine, clorazepate, escitalopram oxalate, esomeprazole, estradiol, fluticasone propionate, fluticasone furoate-vilanterol, hydrocodone-acetaminophen, mirabegron, multivit-min/iron/folic/lutein, and ondansetron.    Allergies:   Review of patient's allergies indicates:   Allergen Reactions    Dilaudid [hydromorphone] Nausea And Vomiting    Morphine Nausea And Vomiting        Objective     Observation: in a sling, pleasnt woman in no apparent distress     Posture: Mild thoracic kyphosis    Shoulder Active Range of Motion within Protcol Limits:    Right Left   Flexion   170 90   ER at 0   60 40   Behind the back Reach   T12 NT   Scapula Upward Rotation 50 NT      Shoulder Passive Range of Motion within Protcol Limits:    Right Left   Flexion   180 120   ER at 0   60 60   ER at 90   90 NT   IR   70 NT     Elbow Active Range of Motion within Protcol Limits   Right Left   Flexion   130 130   Extension   0 0   Supination 90 90   Pronation 90 90     Cervical Active Range of Motion: Full ROM, no pain     Wrist Active Range of Motion : Full ROM, no pain       Palpation: ttp at lat/subscap/post cuff L       Sensation: intact       Pulses: intact.       Special Tests: Not performed today due to status    Strength: Not assessed today due to status.     Joint Mobility: Not assessed today due to status.           CMS  Impairment/Limitation/Restriction for FOTO Shoulder Survey    Therapist reviewed FOTO scores for Concha Hess on 7/25/2019.   FOTO documents entered into [employer] - see Media section.    Limitation Score: 46%  Category: Mobility    Current : CL = least 60% but < 80% impaired, limited or restricted  Goal: CI = at least 1% but < 20% impaired, limited or restricted     Treatment     Treatment Time In: 0930  Treatment Time Out: 1000  Total Treatment time separate from Evaluation: 30 minutes    Geraldine received therapeutic exercises to develop strength, endurance, ROM and flexibility for 25 minutes including:  Pulleys 3m   Table Slides 3m   Iso IR/ER 2x10s holds   Scap Squeeze 20x      Geraldine received the following manual therapy techniques: Joint mobilizations and Soft tissue Mobilization were applied to the: L shoulder for 5 minutes, including:  PROM Flexion/ER/ABD to tolerance   STM to subscap/lat       Home Exercises and Patient Education Provided     Education provided:   - Sling wear  - Prognosis, activity modification, goals for therapy, role of therapy for care, exercises/HEP    Written Home Exercises Provided: yes.  Exercises were reviewed and Geraldine was able to demonstrate them prior to the end of the session.   Pt received a written copy of exercises to perform at home. Geraldine demonstrated good  understanding of the education provided.     See EMR under patient instructions for exercises given.     Assessment     Concha is a 68 y.o. female referred to outpatient Physical Therapy 4 weeks s/p greater trochanter fracture due to a fall on 6/30/2019. The pt demonstrates restricitons in AROM and underlying weakness and will benefit from silled PT services to address these restrictions and improve tolerance to ADLs and IADLs.       Pt will benefit from skilled outpatient Physical Therapy to address the deficits stated above and in the chart below, provide pt/family education, and to maximize pt's level of  independence. Pt prognosis is Good.     Plan of care discussed with patient: Yes  Pt's spiritual, cultural and educational needs considered and patient is agreeable to the plan of care and goals as stated below:       Anticipated Barriers for therapy: none      Medical Necessity is demonstrated by the following  History  Co-morbidities and personal factors that may impact the plan of care Co-morbidities:   previous falls    Personal Factors:   no deficits     low   Examination  Body Structures and Functions, activity limitations and participation restrictions that may impact the plan of care Body Regions:   upper extremities    Body Systems:    ROM  strengthlowbalance  gait  motor control    Participation Restrictions:   none    Activity limitations:   Learning and applying knowledge  no deficits    General Tasks and Commands  no deficits    Communication  no deficits    Mobility  lifting and carrying objects  fine hand use (grasping/picking up)    Self care  no deficits    Domestic Life  doing house work (cleaning house, washing dishes, laundry)    Interactions/Relationships  no deficits    Life Areas  no deficits    Community and Social Life  no deficits         low   Clinical Presentation stable and uncomplicated low   Decision Making/ Complexity Score: low     Goals:  Short Term Goals: 2-4 weeks  1. Pt will be compliant with HEP 50% of prescribed amount.   2. Improve ROM in shoulder flexion to at least 170 Passive and 120 active   3. Tolerate being out of the sling for 2 days straight without pain     Long Term Goals: 8-12 weeks   1. The pt to demo full shoulder AROM and strength compared to the R to improve tolerance to ADLs and IADLs  2. The pt to report full participation in pilates without restrictions related to L UE  3. The pt will report full participation in ADLs and IADLs without restrictions related to L shoulder.     Plan   Plan of care Certification: 7/25/2019 to 10/25/2019.    Outpatient Physical  Therapy 2 times weekly for 12 weeks to include the following interventions: Manual Therapy, Neuromuscular Re-ed, Patient Education, Therapeutic Activites and Therapeutic Exercise.     Zuleima Heck, PT , DPT, SCS, FAAMOMPT

## 2019-07-30 DIAGNOSIS — J44.9 CHRONIC OBSTRUCTIVE PULMONARY DISEASE, UNSPECIFIED COPD TYPE: Primary | ICD-10-CM

## 2019-07-30 RX ORDER — FLUTICASONE FUROATE AND VILANTEROL TRIFENATATE 200; 25 UG/1; UG/1
POWDER RESPIRATORY (INHALATION)
Qty: 1 EACH | Refills: 11 | Status: SHIPPED | OUTPATIENT
Start: 2019-07-30 | End: 2021-01-21 | Stop reason: SDUPTHER

## 2019-08-02 ENCOUNTER — CLINICAL SUPPORT (OUTPATIENT)
Dept: REHABILITATION | Facility: HOSPITAL | Age: 69
End: 2019-08-02
Payer: MEDICARE

## 2019-08-02 DIAGNOSIS — M25.512 ACUTE PAIN OF LEFT SHOULDER: ICD-10-CM

## 2019-08-02 DIAGNOSIS — R29.898 WEAKNESS OF LEFT ARM: ICD-10-CM

## 2019-08-02 DIAGNOSIS — M25.612 DECREASED RANGE OF MOTION OF LEFT SHOULDER: ICD-10-CM

## 2019-08-02 PROCEDURE — 97140 MANUAL THERAPY 1/> REGIONS: CPT | Mod: HCNC

## 2019-08-02 PROCEDURE — 97110 THERAPEUTIC EXERCISES: CPT | Mod: HCNC

## 2019-08-02 NOTE — PROGRESS NOTES
"Physical Therapy Daily Treatment Note     Name: Concha THOMPSON [facility] Number: [id number]    Therapy Diagnosis: No diagnosis found.  Physician: Ye Adam MD    Visit Date: 8/2/2019  Physician Orders: PT Eval and Treat   Medical Diagnosis from Referral: M25.512 (ICD-10-CM) - Left shoulder pain, unspecified chronicity  Evaluation Date: 7/25/2019  Authorization Period Expiration: 12/31/2019  Plan of Care Expiration: 10/25/2019  Visit # / Visits authorized: 2/ 20    Time In: 10:00  Time Out: 11:00  Total Billable Time: 30 minutes    Precautions: Standard   MD note as of 7/16/19:  sling wear for another 3 weeks, particularly when out in public.  Discussed the risk for possible late fracture displacement. Recommended initiation of physical therapy to start in 1 week.  Passive range of motion only to start.    Subjective     Pt reports: sore lateral arm right where sling hits.  She was compliant with home exercise program.  Response to previous treatment: no change  Functional change: no change    Pain: 0/10  Location: left shoulder      Objective     Geraldine received therapeutic exercises to develop strength, endurance, ROM and posture for 52 minutes including:  Bike x 10 min  Pulleys 3m   Table Slides 3m   Sub max Iso IR/ER/FL/ext 2x10s holds   Scap Squeeze 3 m 5" hold  UT S 10x10"  Shoulder shrugs  PROM Flexion/ER/ABD to tolerance    Geraldine participated in neuromuscular re-education activities to improve: Balance, Coordination, Proprioception and Posture for 00 minutes. The following activities were included:      Geraldine participated in dynamic functional therapeutic activities to improve functional performance for 00  minutes, including:      received the following manual therapy techniques: Joint mobilizations and Soft tissue Mobilization were applied to the: L shoulder for 8 minutes, including:     STM to rhomboid/UT  scap mobs      Home Exercises Provided and Patient Education Provided     Education " provided:   none    Written Home Exercises Provided: Patient instructed to cont prior HEP.  Exercises were reviewed and Geraldine was able to demonstrate them prior to the end of the session.  Geraldine demonstrated good  understanding of the education provided.     See EMR under Patient Instructions for exercises provided prior visit.    Assessment   No pain reported throughout session. TTP L rhomboid. Pt has been using L UE to pull pants up. Pt instructed to not use L UE and that we are only performing PROM. PROM has improved since dawood Chandler is progressing well towards her goals.   Pt prognosis is Good.     Pt will continue to benefit from skilled outpatient physical therapy to address the deficits listed in the problem list box on initial evaluation, provide pt/family education and to maximize pt's level of independence in the home and community environment.     Pt's spiritual, cultural and educational needs considered and pt agreeable to plan of care and goals.    Anticipated barriers to physical therapy: none    Goals: Short Term Goals: 2-4 weeks  1. Pt will be compliant with HEP 50% of prescribed amount.   2. Improve ROM in shoulder flexion to at least 170 Passive and 120 active   3. Tolerate being out of the sling for 2 days straight without pain      Long Term Goals: 8-12 weeks   1. The pt to demo full shoulder AROM and strength compared to the R to improve tolerance to ADLs and IADLs  2. The pt to report full participation in pilates without restrictions related to L UE  The pt will report full participation in ADLs and IADLs without restrictions related to L shoulder.     Plan   Cont w/ poc to work on shoulder ROM    Shanae Corey, PTA

## 2019-08-08 ENCOUNTER — TELEPHONE (OUTPATIENT)
Dept: PULMONOLOGY | Facility: CLINIC | Age: 69
End: 2019-08-08

## 2019-08-08 RX ORDER — PREDNISONE 10 MG/1
TABLET ORAL
Qty: 36 TABLET | Refills: 0 | Status: SHIPPED | OUTPATIENT
Start: 2019-08-08 | End: 2019-10-21

## 2019-08-08 RX ORDER — BENZONATATE 100 MG/1
100 CAPSULE ORAL 3 TIMES DAILY PRN
Qty: 30 CAPSULE | Refills: 1 | Status: SHIPPED | OUTPATIENT
Start: 2019-08-08 | End: 2019-08-18

## 2019-08-08 NOTE — TELEPHONE ENCOUNTER
----- Message from [employer] sent at 8/8/2019  8:47 AM CDT -----  Contact: self [phone]  .Needs Advice    Reason for call:        Communication Preference:phone     Additional Information:pt states she would like to Dr. bletran states she cant get an apt with Dr. Snider states he's leaving the country for two months she has copd please call back to discuss

## 2019-08-08 NOTE — TELEPHONE ENCOUNTER
Spoke with patient informed her that I have received her message. Patient is requesting that she see another provider besides Dr. Snider because she was under the impression that Dr. Snider was leaving the country. I advised patient that Dr. Snider is not leaving the country and that Dr. Snider assistant (Joyce) tried to contact her on yesterday to schedule her an appointment with Dr. Snider but no one answered the telephone. Patient states that she will continue to see Dr. Snider and would like to be scheduled an appointment. I verbalized to patient that I understand and she will be contacted to get  Scheduled for a appointment per Dr. Snider Assistant (Joyce).

## 2019-08-09 ENCOUNTER — CLINICAL SUPPORT (OUTPATIENT)
Dept: REHABILITATION | Facility: HOSPITAL | Age: 69
End: 2019-08-09
Payer: MEDICARE

## 2019-08-09 DIAGNOSIS — M25.612 DECREASED RANGE OF MOTION OF LEFT SHOULDER: ICD-10-CM

## 2019-08-09 DIAGNOSIS — R29.898 WEAKNESS OF LEFT ARM: ICD-10-CM

## 2019-08-09 DIAGNOSIS — M25.512 ACUTE PAIN OF LEFT SHOULDER: ICD-10-CM

## 2019-08-09 PROCEDURE — 97110 THERAPEUTIC EXERCISES: CPT | Mod: HCNC

## 2019-08-09 NOTE — PROGRESS NOTES
"Physical Therapy Daily Treatment Note     Name: Concha THOMPSON [facility] Number: [id number]    Therapy Diagnosis:   Encounter Diagnoses   Name Primary?    Acute pain of left shoulder     Decreased range of motion of left shoulder     Weakness of left arm      Physician: Ye Adam MD    Visit Date: 8/9/2019  Physician Orders: PT Eval and Treat   Medical Diagnosis from Referral: M25.512 (ICD-10-CM) - Left shoulder pain, unspecified chronicity  Evaluation Date: 7/25/2019  Authorization Period Expiration: 12/31/2019  Plan of Care Expiration: 10/25/2019  Visit # / Visits authorized: 3/ 20    Time In: 10:00  Time Out: 11:00  Total Billable Time: 60 minutes    Precautions: Standard   MD note as of 7/16/19:  sling wear for another 3 weeks, particularly when out in public.  Discussed the risk for possible late fracture displacement. Recommended initiation of physical therapy to start in 1 week.  Passive range of motion only to start.    Subjective     Pt reports: shoulder feels fine  She was compliant with home exercise program.  Response to previous treatment: no change  Functional change: no change    Pain: 0/10  Location: left shoulder      Objective     Geraldine received therapeutic exercises to develop strength, endurance, ROM and posture for 60 minutes including:  Bike x 10 min  Pulleys 3m   Table Slides 3m   Sub max Iso IR/ER/FL/ext 2x10s holds   Scap Squeeze 3 m 5" hold  UT S 10x10"  Shoulder shrugs 30x  PROM Flexion/ER/ABD to tolerance    Geraldine participated in neuromuscular re-education activities to improve: Balance, Coordination, Proprioception and Posture for 00 minutes. The following activities were included:      Geraldine participated in dynamic functional therapeutic activities to improve functional performance for 00  minutes, including:      received the following manual therapy techniques: Joint mobilizations and Soft tissue Mobilization were applied to the: L shoulder for 00 minutes, " including:     STM to rhomboid/UT  scap mobs      Home Exercises Provided and Patient Education Provided     Education provided:   none    Written Home Exercises Provided: Patient instructed to cont prior HEP.  Exercises were reviewed and Geraldine was able to demonstrate them prior to the end of the session.  Geraldine demonstrated good  understanding of the education provided.     See EMR under Patient Instructions for exercises provided prior visit.    Assessment   Pain at end range of abd and FL PROM. VC needed to not use body during sub max wall iso. Pt seeing PA on Monday before PT appt.will progress is PA permits.   Geraldine is progressing well towards her goals.   Pt prognosis is Good.     Pt will continue to benefit from skilled outpatient physical therapy to address the deficits listed in the problem list box on initial evaluation, provide pt/family education and to maximize pt's level of independence in the home and community environment.     Pt's spiritual, cultural and educational needs considered and pt agreeable to plan of care and goals.    Anticipated barriers to physical therapy: none    Goals: Short Term Goals: 2-4 weeks  1. Pt will be compliant with HEP 50% of prescribed amount.   2. Improve ROM in shoulder flexion to at least 170 Passive and 120 active   3. Tolerate being out of the sling for 2 days straight without pain      Long Term Goals: 8-12 weeks   1. The pt to demo full shoulder AROM and strength compared to the R to improve tolerance to ADLs and IADLs  2. The pt to report full participation in pilates without restrictions related to L UE  The pt will report full participation in ADLs and IADLs without restrictions related to L shoulder.     Plan   Cont w/ poc to work on shoulder ROM    Shanae Corey, PTA

## 2019-08-13 ENCOUNTER — HOSPITAL ENCOUNTER (OUTPATIENT)
Dept: RADIOLOGY | Facility: HOSPITAL | Age: 69
Discharge: HOME | End: 2019-08-13
Attending: PHYSICIAN ASSISTANT
Payer: MEDICARE

## 2019-08-13 ENCOUNTER — OFFICE VISIT (OUTPATIENT)
Dept: SPORTS MEDICINE | Facility: CLINIC | Age: 69
End: 2019-08-13
Payer: MEDICARE

## 2019-08-13 ENCOUNTER — CLINICAL SUPPORT (OUTPATIENT)
Dept: REHABILITATION | Facility: HOSPITAL | Age: 69
End: 2019-08-13
Payer: MEDICARE

## 2019-08-13 VITALS
HEART RATE: 78 BPM | BODY MASS INDEX: 26.68 KG/M2 | SYSTOLIC BLOOD PRESSURE: 109 MMHG | WEIGHT: 145 LBS | DIASTOLIC BLOOD PRESSURE: 71 MMHG | HEIGHT: 62 IN

## 2019-08-13 DIAGNOSIS — M25.512 LEFT SHOULDER PAIN, UNSPECIFIED CHRONICITY: ICD-10-CM

## 2019-08-13 DIAGNOSIS — S42.295D OTHER CLOSED NONDISPLACED FRACTURE OF PROXIMAL END OF LEFT HUMERUS WITH ROUTINE HEALING, SUBSEQUENT ENCOUNTER: Primary | ICD-10-CM

## 2019-08-13 DIAGNOSIS — M25.512 ACUTE PAIN OF LEFT SHOULDER: ICD-10-CM

## 2019-08-13 DIAGNOSIS — R29.898 WEAKNESS OF LEFT ARM: ICD-10-CM

## 2019-08-13 DIAGNOSIS — M25.612 DECREASED RANGE OF MOTION OF LEFT SHOULDER: ICD-10-CM

## 2019-08-13 DIAGNOSIS — S42.295D OTHER CLOSED NONDISPLACED FRACTURE OF PROXIMAL END OF LEFT HUMERUS WITH ROUTINE HEALING, SUBSEQUENT ENCOUNTER: ICD-10-CM

## 2019-08-13 PROCEDURE — 99999 PR PBB SHADOW E&M-EST. PATIENT-LVL IV: CPT | Mod: PBBFAC,HCNC,, | Performed by: PHYSICIAN ASSISTANT

## 2019-08-13 PROCEDURE — 99999 PR PBB SHADOW E&M-EST. PATIENT-LVL IV: ICD-10-PCS | Mod: PBBFAC,HCNC,, | Performed by: PHYSICIAN ASSISTANT

## 2019-08-13 PROCEDURE — 1100F PR PT FALLS ASSESS DOC 2+ FALLS/FALL W/INJURY/YR: ICD-10-PCS | Mod: HCNC,CPTII,S$GLB, | Performed by: PHYSICIAN ASSISTANT

## 2019-08-13 PROCEDURE — 73030 XR SHOULDER COMPLETE 2 OR MORE VIEWS LEFT: ICD-10-PCS | Mod: 26,HCNC,LT, | Performed by: RADIOLOGY

## 2019-08-13 PROCEDURE — 99213 OFFICE O/P EST LOW 20 MIN: CPT | Mod: HCNC,S$GLB,, | Performed by: PHYSICIAN ASSISTANT

## 2019-08-13 PROCEDURE — 73030 X-RAY EXAM OF SHOULDER: CPT | Mod: 26,HCNC,LT, | Performed by: RADIOLOGY

## 2019-08-13 PROCEDURE — 3288F FALL RISK ASSESSMENT DOCD: CPT | Mod: HCNC,CPTII,S$GLB, | Performed by: PHYSICIAN ASSISTANT

## 2019-08-13 PROCEDURE — 99213 PR OFFICE/OUTPT VISIT, EST, LEVL III, 20-29 MIN: ICD-10-PCS | Mod: HCNC,S$GLB,, | Performed by: PHYSICIAN ASSISTANT

## 2019-08-13 PROCEDURE — 3288F PR FALLS RISK ASSESSMENT DOCUMENTED: ICD-10-PCS | Mod: HCNC,CPTII,S$GLB, | Performed by: PHYSICIAN ASSISTANT

## 2019-08-13 PROCEDURE — 73030 X-RAY EXAM OF SHOULDER: CPT | Mod: TC,HCNC,FY,PO,LT

## 2019-08-13 PROCEDURE — 1100F PTFALLS ASSESS-DOCD GE2>/YR: CPT | Mod: HCNC,CPTII,S$GLB, | Performed by: PHYSICIAN ASSISTANT

## 2019-08-13 PROCEDURE — 97110 THERAPEUTIC EXERCISES: CPT | Mod: HCNC

## 2019-08-13 NOTE — PROGRESS NOTES
Physical Therapy Daily Treatment Note     Name: Concha THOMPSON [facility] Number: [id number]    Therapy Diagnosis:   Encounter Diagnoses   Name Primary?    Acute pain of left shoulder     Decreased range of motion of left shoulder     Weakness of left arm      Physician: Ye Adam MD    Visit Date: 8/13/2019  Physician Orders: PT Eval and Treat   Medical Diagnosis from Referral: M25.512 (ICD-10-CM) - Left shoulder pain, unspecified chronicity  Evaluation Date: 7/25/2019  Authorization Period Expiration: 12/31/2019  Plan of Care Expiration: 10/25/2019  Visit # / Visits authorized: 4/ 20    Time In: 1405  Time Out: 1500  Total Billable Time: 30 minutes    Precautions: Standard   MD note as of 8/13/19:  2. Continue physical therapy for humeral fracture protocol okay to proceed with AROM.   3. RTC to see Gunnar Garner PA-C in 6 weeks for follow-up and repeat radiographs.    Subjective     Pt reports: shoulder feels good. She said MD advised that she can start weaning from sling. Only wearing it out in public. The pt reports only pain at end ranges of movement.   She was compliant with home exercise program.  Response to previous treatment: no change  Functional change: no change    Pain: 0/10  Location: left shoulder      Objective       Shoulder Active Range of Motion:   Shoulder Right Left   Flexion   170 170   ER at 0   60 50   Behind the back Reach   L5 L5      Shoulder Passive Range of Motion:   Shoulder Right Left   Flexion   180 170 *pn end range    ER at 0   60 60 *pn end range    IR   NT NT       Strength:   Right Left   Scaption 4+/5 3+/5   Shoulder ER at side 4+/5 3+/5   Shoulder IR at side  4+/5 3+/5       Geraldine received therapeutic exercises to develop strength, endurance, ROM and posture for 45 minutes including:  ube completed for 10 min forward at 2.5 level to increase ROM, endurance and decrease pain to improve tolerance to ADLs and age related activities.     Sidelying ER 1# 40x    Scaption 1# 40x   Franks Carries 10# <->  Plank hold on table 30s   Shoulder table leaning on table 5x5 B    Geraldine participated in neuromuscular re-education activities to improve: Balance, Coordination, Proprioception and Posture for 00 minutes. The following activities were included:      Geraldine participated in dynamic functional therapeutic activities to improve functional performance for 00  minutes, including:      received the following manual therapy techniques: Joint mobilizations and Soft tissue Mobilization were applied to the: L shoulder for 5 minutes, including:     scap mobs, PROM L shoulder       Home Exercises Provided and Patient Education Provided     Education provided:   none    Written Home Exercises Provided: Patient instructed to cont prior HEP.  Exercises were reviewed and Geraldine was able to demonstrate them prior to the end of the session.  Geraldine demonstrated good  understanding of the education provided.     See EMR under Patient Instructions for exercises provided prior visit.    Assessment     The pt has been making excellent improvement in ROM and is safe to progress to light strengthening and modified WB exercises. Adding in these exercises will A with bone healing so long as the pt remains pain free and loading isnt increased too much too soon. The pt able to complete all therex without pain in her shoulder. Advised to cont to modify pilates, not adding in UE yet as she has weakness and requires more motor control of scap and shoulder to complete movements in class safely.     Geraldine is progressing well towards her goals.   Pt prognosis is Good.     Pt will continue to benefit from skilled outpatient physical therapy to address the deficits listed in the problem list box on initial evaluation, provide pt/family education and to maximize pt's level of independence in the home and community environment.     Pt's spiritual, cultural and educational needs considered and pt agreeable to  plan of care and goals.    Anticipated barriers to physical therapy: none    Goals: Short Term Goals: 2-4 weeks  1. Pt will be compliant with HEP 50% of prescribed amount.   2. Improve ROM in shoulder flexion to at least 170 Passive and 120 active   3. Tolerate being out of the sling for 2 days straight without pain      Long Term Goals: 8-12 weeks   1. The pt to demo full shoulder AROM and strength compared to the R to improve tolerance to ADLs and IADLs  2. The pt to report full participation in pilates without restrictions related to L UE  The pt will report full participation in ADLs and IADLs without restrictions related to L shoulder.     Plan   Cont w/ poc to work on shoulder ROM    Zuleima Heck, PT DPT

## 2019-08-13 NOTE — PROGRESS NOTES
CC: Left shoulder pain     68 y.o. Female presents for follow-up, previously seen by AURY López MD 6 weeks s/p proximal humeral fracture. She reports significant relief with PT and in no longer wearing her sling. Mild intermittent pain with increased activity.    Previous HPI: She presents with LEFT shoulder pain x 2 weeks after a fall from a ladder.  She had a positive loss of consciousness and was seen in the emergency department and treated for a intraventricular hemorrhage. Radiographs were obtained demonstrating a greater tuberosity proximal humerus fracture.  Pain localizes to the anterolateral shoulder. Worse with any shoulder motion. Minimal pain at rest.  She has been wearing a sling for the past 2 weeks and working on gentle passive range of motion exercises.  Her pain has improved significantly over the past 2 weeks.  Denies neck pain or radicular symptoms. Treatment thus far has included activity modifications, rest, sling wear, and oral medication.  Here today to discuss diagnosis and treatment options.     RHD    PMHx notable for COPD  Negative for tobacco.   Negative for diabetes.     PAST MEDICAL HISTORY:   Past Medical History:   Diagnosis Date    Acid reflux     Chronic lung disease     COPD (chronic obstructive pulmonary disease)     Depression     Gastric ulcer     Hyperlipidemia     Paralysis of diaphragm     left    PONV (postoperative nausea and vomiting)        PAST SURGICAL HISTORY:  Past Surgical History:   Procedure Laterality Date    APPENDECTOMY      BREAST SURGERY      AUGMENTATION     SECTION      COSMETIC SURGERY      FACE LIFT breast reduction    HYSTEROSCOPY  2017    [id number] N/A 3/21/2017    Performed by Alexandra Thompson MD at [facility] OR       FAMILY HISTORY:  Family History   Problem Relation Age of Onset    Cancer Mother     Heart disease Father     Colon cancer Sister     Cervical cancer Neg Hx     Endometrial cancer Neg Hx      Vaginal cancer Neg Hx     Breast cancer Neg Hx     Ovarian cancer Neg Hx      MEDICATIONS:    Current Outpatient Medications:     albuterol (VENTOLIN HFA) 90 mcg/actuation inhaler, Inhale 2 puffs into the lungs every 4 (four) hours as needed for Wheezing or Shortness of Breath. Rescue, Disp: 18 g, Rfl: 5    albuterol-ipratropium (DUO-NEB) 2.5 mg-0.5 mg/3 mL nebulizer solution, Take 3 mLs by nebulization every 6 (six) hours as needed for Wheezing or Shortness of Breath. Rescue, Disp: 150 mL, Rfl: 5    benzonatate (TESSALON PERLES) 100 MG capsule, Take 1 capsule (100 mg total) by mouth 3 (three) times daily as needed for Cough., Disp: 30 capsule, Rfl: 1    BREO ELLIPTA 200-25 mcg/dose DsDv diskus inhaler, INHALE 1 PUFF BY MOUTH EVERY DAY, Disp: 1 each, Rfl: 11    cetirizine 10 mg chewable tablet, Take 10 mg by mouth once daily., Disp: , Rfl:     clorazepate (TRANXENE) 7.5 MG Tab, TAKE 1 TABLET BY MOUTH TWICE DAILY, Disp: 60 tablet, Rfl: 3    escitalopram oxalate (LEXAPRO) 10 MG tablet, Take 1 tablet (10 mg total) by mouth once daily., Disp: 90 tablet, Rfl: 3    esomeprazole (NEXIUM) 20 MG capsule, Take 20 mg by mouth before breakfast., Disp: , Rfl:     estradiol (ESTRACE) 0.01 % (0.1 mg/gram) vaginal cream, Use 0.5 gram of estrogen cream in vagina nightly x 2 weeks, then twice a week thereafter, Disp: 42.5 g, Rfl: 3    fluticasone (FLONASE) 50 mcg/actuation nasal spray, 2 sprays (100 mcg total) by Each Nare route once daily., Disp: 1 Bottle, Rfl: 0    HYDROcodone-acetaminophen (NORCO) 5-325 mg per tablet, Take 1 tablet by mouth every 6 (six) hours as needed for Pain., Disp: 12 tablet, Rfl: 0    mirabegron (MYRBETRIQ) 50 mg Tb24, Take 1 tablet (50 mg total) by mouth once daily., Disp: 30 tablet, Rfl: 11    MULTIVITS-MIN/IRON/FA/LUTEIN (ULTIMATE WOMEN'S COMPLETE 50+ ORAL), Take by mouth., Disp: , Rfl:     ondansetron (ZOFRAN) 4 MG tablet, Take 1 tablet (4 mg total) by mouth every 8 (eight) hours as  needed for Nausea., Disp: 12 tablet, Rfl: 0    predniSONE (DELTASONE) 10 MG tablet, Take 3 tablets x 7 days then 2 tablets x 7days, Disp: 36 tablet, Rfl: 0    ALLERGIES:  Review of patient's allergies indicates:   Allergen Reactions    Dilaudid [hydromorphone] Nausea And Vomiting    Morphine Nausea And Vomiting     REVIEW OF SYSTEMS:  Constitution: Negative. Negative for chills, fever and night sweats.    Hematologic/Lymphatic: Negative for bleeding problem. Does not bruise/bleed easily.   Skin: Negative for dry skin, itching and rash.   Musculoskeletal: Negative for falls. Positive for left shoulder pain and  muscle weakness.     All other review of symptoms were reviewed and found to be noncontributory.    PHYSICAL EXAMINATION:  Vitals:  There were no vitals taken for this visit.   General: Well-developed well-nourished 68 y.o. femalein no acute distress   Cardiovascular: Regular rhythm by palpation of distal pulse, normal color and temperature, no concerning varicosities on symptomatic side   Lungs: No labored breathing or wheezing appreciated   Neuro: Alert and oriented ×3   Psychiatric: well oriented to person, place and time, demonstrates normal mood and affect   Skin: No rashes, lesions or ulcers, normal temperature, turgor, and texture on uninvolved extremity     Ortho/SPM Exam  Examination of the left shoulder demonstrates no proximal humerus swelling or ecchymosis.  Negative TTP.  Active total elevation to 140°, external rotation to 90°, and internal rotation to T11.  Strength not tested.  Nontender over the AC joint or bicipital groove.  Grossly stable     IMAGING:  Xrays including AP, Outlet and Axillary Lateral of Left shoulder are ordered / images reviewed by me:  Recent fracture of the proximal left humerus in the area of the greater tuberosity.  No significant detrimental change since the examinations of 06/30/2019.    CT scan of the shoulder demonstrates:   Mildly comminuted and minimally  displaced fracture involving the greater tuberosity of the left proximal humerus.    ASSESSMENT:      ICD-10-CM ICD-9-CM   1. Other closed nondisplaced fracture of proximal end of left humerus with routine healing, subsequent encounter S42.295D V54.11   2. Left shoulder pain, unspecified chronicity M25.512 719.41     Nondisplaced fracture involving the surgical neck, impacted minimally displaced fracture of the greater tuberosity    PLAN:     1. Findings and treatment options were discussed with the patient. She has a minimally displaced proximal humerus fracture including the greater tuberosity and a nondisplaced anatomic neck fracture. Recommended continued conservative management.  Discussed the risk for possible late fracture displacement.   2. Continue physical therapy for humeral fracture protocol okay to proceed with AROM.   3. RTC to see Gunnar Garner PA-C in 6 weeks for follow-up and repeat radiographs.    All of the patient's questions were answered and the patient will contact us if they have any questions or concerns in the interim.    Procedures

## 2019-08-16 ENCOUNTER — CLINICAL SUPPORT (OUTPATIENT)
Dept: REHABILITATION | Facility: HOSPITAL | Age: 69
End: 2019-08-16
Payer: MEDICARE

## 2019-08-16 DIAGNOSIS — M25.612 DECREASED RANGE OF MOTION OF LEFT SHOULDER: ICD-10-CM

## 2019-08-16 DIAGNOSIS — M25.512 ACUTE PAIN OF LEFT SHOULDER: ICD-10-CM

## 2019-08-16 DIAGNOSIS — R29.898 WEAKNESS OF LEFT ARM: ICD-10-CM

## 2019-08-16 PROCEDURE — 97110 THERAPEUTIC EXERCISES: CPT | Mod: HCNC

## 2019-08-16 PROCEDURE — 97112 NEUROMUSCULAR REEDUCATION: CPT | Mod: HCNC

## 2019-08-16 NOTE — PROGRESS NOTES
Physical Therapy Daily Treatment Note     Name: Concha THOMPSON [facility] Number: [id number]    Therapy Diagnosis:   Encounter Diagnoses   Name Primary?    Acute pain of left shoulder     Decreased range of motion of left shoulder     Weakness of left arm      Physician: Ye Adam MD    Visit Date: 8/16/2019  Physician Orders: PT Eval and Treat   Medical Diagnosis from Referral: M25.512 (ICD-10-CM) - Left shoulder pain, unspecified chronicity  Evaluation Date: 7/25/2019  Authorization Period Expiration: 12/31/2019  Plan of Care Expiration: 10/25/2019  Visit # / Visits authorized: 6/ 20    Time In: 10:02  Time Out: 11:00  Total Billable Time: 23 minutes    Precautions: Standard   MD note as of 8/13/19:  2. Continue physical therapy for humeral fracture protocol okay to proceed with AROM.   3. RTC to see Gunnar Garner PA-C in 6 weeks for follow-up and repeat radiographs.    Subjective     Pt reports: Sore after last session  She was compliant with home exercise program.  Response to previous treatment: no change  Functional change: no change    Pain: 0/10  Location: left shoulder      Objective       Shoulder Active Range of Motion:   Shoulder Right Left   Flexion   170 170   ER at 0   60 50   Behind the back Reach   L5 L5      Shoulder Passive Range of Motion:   Shoulder Right Left   Flexion   180 170 *pn end range    ER at 0   60 60 *pn end range    IR   NT NT       Strength:   Right Left   Scaption 4+/5 3+/5   Shoulder ER at side 4+/5 3+/5   Shoulder IR at side  4+/5 3+/5       Geraldine received therapeutic exercises to develop strength, endurance, ROM and posture for 43 minutes including:  ube completed for 10 min forward at 2.5 level to increase ROM, endurance and decrease pain to improve tolerance to ADLs and age related activities.   Wall slides FL 3m  Sidelying ER 1# 40x   Scaption 0# 40x   Prone shoulder ext  Prone Row 2x15  Prone T 2x15  Prone Y 2x15    Geraldine participated in neuromuscular  re-education activities to improve: Balance, Coordination, Proprioception and Posture for 10 minutes. The following activities were included:  Franks Carries 10# <-> 3 laps  Plank hold on table 2x30s  (1x w/ hip ext)  Shoulder table leaning on table 3x20 B    Geraldine participated in dynamic functional therapeutic activities to improve functional performance for 00  minutes, including:      received the following manual therapy techniques: Joint mobilizations and Soft tissue Mobilization were applied to the: L shoulder for 5 minutes, including:     scap mobs, PROM L shoulder       Home Exercises Provided and Patient Education Provided     Education provided:   none    Written Home Exercises Provided: Patient instructed to cont prior HEP.  Exercises were reviewed and Geraldine was able to demonstrate them prior to the end of the session.  Geraldine demonstrated good  understanding of the education provided.     See EMR under Patient Instructions for exercises provided prior visit.    Assessment     Good scap stab w/ prone ex. All nikhil performed pain free. Pain of 2/10 at end range of PROM FL.    Geraldine is progressing well towards her goals.   Pt prognosis is Good.     Pt will continue to benefit from skilled outpatient physical therapy to address the deficits listed in the problem list box on initial evaluation, provide pt/family education and to maximize pt's level of independence in the home and community environment.     Pt's spiritual, cultural and educational needs considered and pt agreeable to plan of care and goals.    Anticipated barriers to physical therapy: none    Goals: Short Term Goals: 2-4 weeks  1. Pt will be compliant with HEP 50% of prescribed amount.   2. Improve ROM in shoulder flexion to at least 170 Passive and 120 active   3. Tolerate being out of the sling for 2 days straight without pain      Long Term Goals: 8-12 weeks   1. The pt to demo full shoulder AROM and strength compared to the R to improve  tolerance to ADLs and IADLs  2. The pt to report full participation in pilates without restrictions related to L UE  The pt will report full participation in ADLs and IADLs without restrictions related to L shoulder.     Plan   Cont w/ poc to work on shoulder ROM    Shanae Corey, PTA DPT

## 2019-08-22 RX ORDER — CLORAZEPATE DIPOTASSIUM 7.5 MG/1
7.5 TABLET ORAL 2 TIMES DAILY
Qty: 60 TABLET | Refills: 5 | Status: SHIPPED | OUTPATIENT
Start: 2019-08-22 | End: 2020-02-24

## 2019-08-30 ENCOUNTER — TELEPHONE (OUTPATIENT)
Dept: PRIMARY CARE CLINIC | Facility: CLINIC | Age: 69
End: 2019-08-30

## 2019-08-30 NOTE — TELEPHONE ENCOUNTER
Tried calling carlos but she was out of the office. Left a detailed message that I had not received anything yet and for her to return my call

## 2019-08-30 NOTE — TELEPHONE ENCOUNTER
----- Message from Freya Madrigal sent at 8/30/2019 10:00 AM CDT -----  Contact: Josefa with [employer] phone [phone]  Josefa with [employer] phone [phone], Calling to inquire if we received the fax regarding the bone fracture and testing. Please call her. Thanks.

## 2019-09-13 ENCOUNTER — TELEPHONE (OUTPATIENT)
Dept: PRIMARY CARE CLINIC | Facility: CLINIC | Age: 69
End: 2019-09-13

## 2019-09-13 RX ORDER — AZITHROMYCIN 250 MG/1
TABLET, FILM COATED ORAL
Qty: 6 TABLET | Refills: 0 | Status: SHIPPED | OUTPATIENT
Start: 2019-09-13 | End: 2019-10-21

## 2019-09-13 NOTE — TELEPHONE ENCOUNTER
----- Message from Freya Madrigal sent at 9/13/2019 11:05 AM CDT -----  Contact: Josefa with [employer] phone [phone]  Josefa with [employer] phone [phone], Calling to inquire if we received the information regarding the bone fracture. Please call her. Thanks.

## 2019-09-20 ENCOUNTER — TELEPHONE (OUTPATIENT)
Dept: PRIMARY CARE CLINIC | Facility: CLINIC | Age: 69
End: 2019-09-20

## 2019-09-20 NOTE — TELEPHONE ENCOUNTER
----- Message from Freya Madrigal sent at 9/20/2019 11:20 AM CDT -----  Contact: Josefa with [employer] phone [phone]  Josefa with [employer] phone [phone], Calling to confirm that we received the information regarding patient's bone fracture. Please call her. Thanks.

## 2019-09-20 NOTE — TELEPHONE ENCOUNTER
Tried calling Josefa again but no answer. Message left with my e-mail so she can try to reach me via e-mail since we seem to not be able to get a hold of one another.

## 2019-09-24 ENCOUNTER — TELEPHONE (OUTPATIENT)
Dept: PRIMARY CARE CLINIC | Facility: CLINIC | Age: 69
End: 2019-09-24

## 2019-09-24 ENCOUNTER — PATIENT OUTREACH (OUTPATIENT)
Dept: ADMINISTRATIVE | Facility: OTHER | Age: 69
End: 2019-09-24

## 2019-09-24 DIAGNOSIS — M89.9 DISORDER OF BONE: Primary | ICD-10-CM

## 2019-09-24 NOTE — TELEPHONE ENCOUNTER
----- Message from Chery Hu sent at 9/24/2019  2:05 PM CDT -----  Contact: Josefa/[facility] [phone]  Stated that they do not do e-mail. Stated that they needs a bone density to do completed by Jan 12, 2020.    Thank you

## 2019-09-24 NOTE — TELEPHONE ENCOUNTER
Josefa has called many of times and all the message says she is calling regarding a bone density. I have called her back and can never get a hold a of her. I left my e-mail for her to e-mail me what she needs but this message says she does not e-mail and the patient needs a bone density done before the beginning on the year. It is due per her health maintenance.

## 2019-09-30 ENCOUNTER — HOSPITAL ENCOUNTER (OUTPATIENT)
Dept: RADIOLOGY | Facility: OTHER | Age: 69
Discharge: HOME | End: 2019-09-30
Attending: FAMILY MEDICINE
Payer: MEDICARE

## 2019-09-30 ENCOUNTER — HOSPITAL ENCOUNTER (OUTPATIENT)
Dept: RADIOLOGY | Facility: OTHER | Age: 69
Discharge: HOME | End: 2019-09-30
Attending: OBSTETRICS & GYNECOLOGY
Payer: MEDICARE

## 2019-09-30 DIAGNOSIS — M89.9 DISORDER OF BONE: ICD-10-CM

## 2019-09-30 DIAGNOSIS — R10.2 PELVIC PRESSURE IN FEMALE: ICD-10-CM

## 2019-09-30 PROCEDURE — 77080 DXA BONE DENSITY AXIAL: CPT | Mod: 26,,, | Performed by: RADIOLOGY

## 2019-09-30 PROCEDURE — 76830 US PELVIS COMP WITH TRANSVAG NON-OB (XPD): ICD-10-PCS | Mod: 26,,, | Performed by: RADIOLOGY

## 2019-09-30 PROCEDURE — 76856 US PELVIS COMP WITH TRANSVAG NON-OB (XPD): ICD-10-PCS | Mod: 26,,, | Performed by: RADIOLOGY

## 2019-09-30 PROCEDURE — 76830 TRANSVAGINAL US NON-OB: CPT | Mod: TC

## 2019-09-30 PROCEDURE — 76830 TRANSVAGINAL US NON-OB: CPT | Mod: 26,,, | Performed by: RADIOLOGY

## 2019-09-30 PROCEDURE — 76856 US EXAM PELVIC COMPLETE: CPT | Mod: 26,,, | Performed by: RADIOLOGY

## 2019-09-30 PROCEDURE — 77080 DEXA BONE DENSITY SPINE HIP: ICD-10-PCS | Mod: 26,,, | Performed by: RADIOLOGY

## 2019-09-30 PROCEDURE — 77080 DXA BONE DENSITY AXIAL: CPT | Mod: TC

## 2019-10-01 ENCOUNTER — OFFICE VISIT (OUTPATIENT)
Dept: SPORTS MEDICINE | Facility: CLINIC | Age: 69
End: 2019-10-01
Payer: MEDICARE

## 2019-10-01 ENCOUNTER — HOSPITAL ENCOUNTER (OUTPATIENT)
Dept: RADIOLOGY | Facility: HOSPITAL | Age: 69
Discharge: HOME | End: 2019-10-01
Attending: PHYSICIAN ASSISTANT
Payer: MEDICARE

## 2019-10-01 VITALS — HEIGHT: 62 IN | WEIGHT: 145 LBS | BODY MASS INDEX: 26.68 KG/M2

## 2019-10-01 DIAGNOSIS — M85.851 OSTEOPENIA OF BOTH HIPS: ICD-10-CM

## 2019-10-01 DIAGNOSIS — S42.295D OTHER CLOSED NONDISPLACED FRACTURE OF PROXIMAL END OF LEFT HUMERUS WITH ROUTINE HEALING, SUBSEQUENT ENCOUNTER: Primary | ICD-10-CM

## 2019-10-01 DIAGNOSIS — M85.852 OSTEOPENIA OF BOTH HIPS: ICD-10-CM

## 2019-10-01 DIAGNOSIS — M25.512 LEFT SHOULDER PAIN, UNSPECIFIED CHRONICITY: ICD-10-CM

## 2019-10-01 PROCEDURE — 73030 X-RAY EXAM OF SHOULDER: CPT | Mod: TC,LT

## 2019-10-01 PROCEDURE — 3288F FALL RISK ASSESSMENT DOCD: CPT | Mod: CPTII,S$GLB,, | Performed by: PHYSICIAN ASSISTANT

## 2019-10-01 PROCEDURE — 1100F PTFALLS ASSESS-DOCD GE2>/YR: CPT | Mod: CPTII,S$GLB,, | Performed by: PHYSICIAN ASSISTANT

## 2019-10-01 PROCEDURE — 73030 X-RAY EXAM OF SHOULDER: CPT | Mod: 26,LT,, | Performed by: RADIOLOGY

## 2019-10-01 PROCEDURE — 99213 PR OFFICE/OUTPT VISIT, EST, LEVL III, 20-29 MIN: ICD-10-PCS | Mod: S$GLB,,, | Performed by: PHYSICIAN ASSISTANT

## 2019-10-01 PROCEDURE — 3288F PR FALLS RISK ASSESSMENT DOCUMENTED: ICD-10-PCS | Mod: CPTII,S$GLB,, | Performed by: PHYSICIAN ASSISTANT

## 2019-10-01 PROCEDURE — 1100F PR PT FALLS ASSESS DOC 2+ FALLS/FALL W/INJURY/YR: ICD-10-PCS | Mod: CPTII,S$GLB,, | Performed by: PHYSICIAN ASSISTANT

## 2019-10-01 PROCEDURE — 99213 OFFICE O/P EST LOW 20 MIN: CPT | Mod: S$GLB,,, | Performed by: PHYSICIAN ASSISTANT

## 2019-10-01 PROCEDURE — 73030 XR SHOULDER COMPLETE 2 OR MORE VIEWS LEFT: ICD-10-PCS | Mod: 26,LT,, | Performed by: RADIOLOGY

## 2019-10-01 PROCEDURE — 99999 PR PBB SHADOW E&M-EST. PATIENT-LVL III: CPT | Mod: PBBFAC,,, | Performed by: PHYSICIAN ASSISTANT

## 2019-10-01 PROCEDURE — 99999 PR PBB SHADOW E&M-EST. PATIENT-LVL III: ICD-10-PCS | Mod: PBBFAC,,, | Performed by: PHYSICIAN ASSISTANT

## 2019-10-01 RX ORDER — LYSINE HCL 500 MG
1 TABLET ORAL 2 TIMES DAILY
Qty: 180 TABLET | Refills: 3 | Status: SHIPPED | OUTPATIENT
Start: 2019-10-01 | End: 2021-01-21 | Stop reason: SDUPTHER

## 2019-10-01 RX ORDER — ESCITALOPRAM OXALATE 10 MG/1
TABLET ORAL
Qty: 90 TABLET | Refills: 3 | Status: SHIPPED | OUTPATIENT
Start: 2019-10-01 | End: 2020-08-26

## 2019-10-01 NOTE — PROGRESS NOTES
CC: Left shoulder pain     68 y.o. Female presents for follow-up, previously seen by AURY López MD 3 months s/p proximal humeral fracture. She reports significant relief with PT and in no longer wearing her sling. Mild intermittent pain with increased activity.    Previous HPI: She presents with LEFT shoulder pain x 2 weeks after a fall from a ladder.  She had a positive loss of consciousness and was seen in the emergency department and treated for a intraventricular hemorrhage. Radiographs were obtained demonstrating a greater tuberosity proximal humerus fracture.  Pain localizes to the anterolateral shoulder. Worse with any shoulder motion. Minimal pain at rest.  She has been wearing a sling for the past 2 weeks and working on gentle passive range of motion exercises.  Her pain has improved significantly over the past 2 weeks.  Denies neck pain or radicular symptoms. Treatment thus far has included activity modifications, rest, sling wear, and oral medication.  Here today to discuss diagnosis and treatment options.     RHD    PMHx notable for COPD  Negative for tobacco.   Negative for diabetes.     PAST MEDICAL HISTORY:   Past Medical History:   Diagnosis Date    Acid reflux     Chronic lung disease     COPD (chronic obstructive pulmonary disease)     Depression     Gastric ulcer     Hyperlipidemia     Paralysis of diaphragm     left    PONV (postoperative nausea and vomiting)        PAST SURGICAL HISTORY:  Past Surgical History:   Procedure Laterality Date    APPENDECTOMY      BREAST SURGERY      AUGMENTATION     SECTION      COSMETIC SURGERY      FACE LIFT breast reduction    HYSTEROSCOPY         FAMILY HISTORY:  Family History   Problem Relation Age of Onset    Cancer Mother     Heart disease Father     Colon cancer Sister     Cervical cancer Neg Hx     Endometrial cancer Neg Hx     Vaginal cancer Neg Hx     Breast cancer Neg Hx     Ovarian cancer Neg Hx       MEDICATIONS:    Current Outpatient Medications:     albuterol (VENTOLIN HFA) 90 mcg/actuation inhaler, Inhale 2 puffs into the lungs every 4 (four) hours as needed for Wheezing or Shortness of Breath. Rescue, Disp: 18 g, Rfl: 5    albuterol-ipratropium (DUO-NEB) 2.5 mg-0.5 mg/3 mL nebulizer solution, Take 3 mLs by nebulization every 6 (six) hours as needed for Wheezing or Shortness of Breath. Rescue, Disp: 150 mL, Rfl: 5    azithromycin (ZITHROMAX Z-SANDY) 250 MG tablet, 2 tabs day 1, then 1 tab daily x 4 days, Disp: 6 tablet, Rfl: 0    BREO ELLIPTA 200-25 mcg/dose DsDv diskus inhaler, INHALE 1 PUFF BY MOUTH EVERY DAY, Disp: 1 each, Rfl: 11    calcium carbonate-vit D3-min 600 mg calcium- 400 unit Tab, Take 1 tablet by mouth 2 (two) times daily., Disp: 180 tablet, Rfl: 3    cetirizine 10 mg chewable tablet, Take 10 mg by mouth once daily., Disp: , Rfl:     clorazepate (TRANXENE) 7.5 MG Tab, Take 1 tablet (7.5 mg total) by mouth 2 (two) times daily., Disp: 60 tablet, Rfl: 5    escitalopram oxalate (LEXAPRO) 10 MG tablet, TAKE 1 TABLET(10 MG) BY MOUTH EVERY DAY, Disp: 90 tablet, Rfl: 3    esomeprazole (NEXIUM) 20 MG capsule, Take 20 mg by mouth before breakfast., Disp: , Rfl:     estradiol (ESTRACE) 0.01 % (0.1 mg/gram) vaginal cream, Use 0.5 gram of estrogen cream in vagina nightly x 2 weeks, then twice a week thereafter, Disp: 42.5 g, Rfl: 3    fluticasone (FLONASE) 50 mcg/actuation nasal spray, 2 sprays (100 mcg total) by Each Nare route once daily., Disp: 1 Bottle, Rfl: 0    HYDROcodone-acetaminophen (NORCO) 5-325 mg per tablet, Take 1 tablet by mouth every 6 (six) hours as needed for Pain., Disp: 12 tablet, Rfl: 0    mirabegron (MYRBETRIQ) 50 mg Tb24, Take 1 tablet (50 mg total) by mouth once daily., Disp: 30 tablet, Rfl: 11    MULTIVITS-MIN/IRON/FA/LUTEIN (ULTIMATE WOMEN'S COMPLETE 50+ ORAL), Take by mouth., Disp: , Rfl:     ondansetron (ZOFRAN) 4 MG tablet, Take 1 tablet (4 mg total) by mouth  "every 8 (eight) hours as needed for Nausea., Disp: 12 tablet, Rfl: 0    predniSONE (DELTASONE) 10 MG tablet, Take 3 tablets x 7 days then 2 tablets x 7days, Disp: 36 tablet, Rfl: 0    ALLERGIES:  Review of patient's allergies indicates:   Allergen Reactions    Dilaudid [hydromorphone] Nausea And Vomiting    Morphine Nausea And Vomiting     REVIEW OF SYSTEMS:  Constitution: Negative. Negative for chills, fever and night sweats.    Hematologic/Lymphatic: Negative for bleeding problem. Does not bruise/bleed easily.   Skin: Negative for dry skin, itching and rash.   Musculoskeletal: Negative for falls. Positive for left shoulder pain and  muscle weakness.     All other review of symptoms were reviewed and found to be noncontributory.    PHYSICAL EXAMINATION:  Vitals:  Ht 5' 2" (1.575 m)   Wt 65.8 kg (145 lb)   BMI 26.52 kg/m²    General: Well-developed well-nourished 68 y.o. femalein no acute distress   Cardiovascular: Regular rhythm by palpation of distal pulse, normal color and temperature, no concerning varicosities on symptomatic side   Lungs: No labored breathing or wheezing appreciated   Neuro: Alert and oriented ×3   Psychiatric: well oriented to person, place and time, demonstrates normal mood and affect   Skin: No rashes, lesions or ulcers, normal temperature, turgor, and texture on uninvolved extremity     Ortho/SPM Exam  Examination of the left shoulder demonstrates no proximal humerus swelling or ecchymosis.  Negative TTP.  Active total elevation to 140°, external rotation to 90°, and internal rotation to T11.  Strength not tested.  Nontender over the AC joint or bicipital groove.  Grossly stable     IMAGING:  Xrays including AP, Outlet and Axillary Lateral of Left shoulder are ordered / images reviewed by me:  Recent fracture of the proximal left humerus in the area of the greater tuberosity.  No significant detrimental change since the examinations of 06/30/2019.    CT scan of the shoulder " demonstrates:   Mildly comminuted and minimally displaced fracture involving the greater tuberosity of the left proximal humerus.    ASSESSMENT:      ICD-10-CM ICD-9-CM   1. Other closed nondisplaced fracture of proximal end of left humerus with routine healing, subsequent encounter S42.295D V54.11   2. Left shoulder pain, unspecified chronicity M25.512 719.41     Nondisplaced fracture involving the surgical neck, impacted minimally displaced fracture of the greater tuberosity    PLAN:     1. Findings and treatment options were discussed with the patient. She has a minimally displaced proximal humerus fracture including the greater tuberosity and a nondisplaced anatomic neck fracture. Recommended continued conservative management.  Discussed the risk for possible late fracture displacement.   2. Continue physical therapy for humeral fracture protocol okay to proceed with AROM.   3. RTC to see Gunnar Garner PA-C as needed for follow-up.    All of the patient's questions were answered and the patient will contact us if they have any questions or concerns in the interim.    Procedures

## 2019-10-02 ENCOUNTER — TELEPHONE (OUTPATIENT)
Dept: UROGYNECOLOGY | Facility: CLINIC | Age: 69
End: 2019-10-02

## 2019-10-02 NOTE — TELEPHONE ENCOUNTER
----- Message from Michelle Lockwood NP sent at 10/2/2019 10:00 AM CDT -----  Please let patient know other than a few small benign fibroids, her ultrasound was normal.       JIMMIE Hancock-BC

## 2019-10-02 NOTE — TELEPHONE ENCOUNTER
Attempted to contact pt to inform her that other than a few small benign fibroids her ultrasound is normal. Pt did not answer. LM to call office.

## 2019-10-14 ENCOUNTER — TELEPHONE (OUTPATIENT)
Dept: UROGYNECOLOGY | Facility: CLINIC | Age: 69
End: 2019-10-14

## 2019-10-14 NOTE — TELEPHONE ENCOUNTER
----- Message from Yvette Bhandari sent at 10/14/2019 11:56 AM CDT -----  Contact: LICO ASENCIO [[id number]]  Type:  Patient Returning Call    Who Called:LICO ASENCIO [[id number]]     Who Left Message for Patient: Ortiz Slater    Does the patient know what this is regarding?:Y     Best Call Back Number:[phone]    Additional Information:

## 2019-10-14 NOTE — TELEPHONE ENCOUNTER
----- Message from Audrey Chu sent at 10/14/2019  1:56 PM CDT -----  Contact: LICO ASENCIO [[id number]]  Type:  Patient Returning Call    Who Called: LICO ASENCIO [[id number]]    Who Left Message for Patient: Ortiz Slater MA       Does the patient know what this is regarding?:no     Best Call Back Number:[phone]     Additional Information:

## 2019-10-14 NOTE — TELEPHONE ENCOUNTER
Pt was informed that other than a few small fibroids, her US was normal. Pt is aware and verbalizes understanding.

## 2019-10-21 ENCOUNTER — OFFICE VISIT (OUTPATIENT)
Dept: PRIMARY CARE CLINIC | Facility: CLINIC | Age: 69
End: 2019-10-21
Payer: MEDICARE

## 2019-10-21 ENCOUNTER — CLINICAL SUPPORT (OUTPATIENT)
Dept: PRIMARY CARE CLINIC | Facility: CLINIC | Age: 69
End: 2019-10-21
Payer: MEDICARE

## 2019-10-21 VITALS
HEART RATE: 75 BPM | RESPIRATION RATE: 18 BRPM | DIASTOLIC BLOOD PRESSURE: 82 MMHG | HEIGHT: 62 IN | WEIGHT: 149 LBS | TEMPERATURE: 98 F | SYSTOLIC BLOOD PRESSURE: 118 MMHG | BODY MASS INDEX: 27.42 KG/M2 | OXYGEN SATURATION: 95 %

## 2019-10-21 DIAGNOSIS — R91.1 LUNG NODULE: ICD-10-CM

## 2019-10-21 DIAGNOSIS — G31.84 MILD COGNITIVE IMPAIRMENT: ICD-10-CM

## 2019-10-21 DIAGNOSIS — F07.81 POSTCONCUSSIVE SYNDROME: ICD-10-CM

## 2019-10-21 DIAGNOSIS — E55.9 VITAMIN D DEFICIENCY: ICD-10-CM

## 2019-10-21 DIAGNOSIS — E78.5 HYPERLIPIDEMIA, UNSPECIFIED HYPERLIPIDEMIA TYPE: ICD-10-CM

## 2019-10-21 DIAGNOSIS — R91.1 PULMONARY NODULE, RIGHT: ICD-10-CM

## 2019-10-21 DIAGNOSIS — Z23 NEED FOR VACCINATION: ICD-10-CM

## 2019-10-21 DIAGNOSIS — R41.9 UNSPECIFIED SYMPTOMS AND SIGNS INVOLVING COGNITIVE FUNCTIONS AND AWARENESS: ICD-10-CM

## 2019-10-21 DIAGNOSIS — Z11.59 NEED FOR HEPATITIS C SCREENING TEST: ICD-10-CM

## 2019-10-21 DIAGNOSIS — G31.84 MILD COGNITIVE IMPAIRMENT: Primary | ICD-10-CM

## 2019-10-21 PROBLEM — S06.0XAA CONCUSSION: Chronic | Status: RESOLVED | Noted: 2019-06-30 | Resolved: 2019-10-21

## 2019-10-21 LAB
25(OH)D3+25(OH)D2 SERPL-MCNC: 39 NG/ML (ref 30–96)
ALBUMIN SERPL BCP-MCNC: 4.4 G/DL (ref 3.5–5.2)
ALP SERPL-CCNC: 67 U/L (ref 38–126)
ALT SERPL W/O P-5'-P-CCNC: 15 U/L (ref 14–54)
ANION GAP SERPL CALC-SCNC: 9 MMOL/L (ref 8–16)
AST SERPL-CCNC: 17 U/L (ref 15–41)
BASOPHILS # BLD AUTO: 0.1 K/UL (ref 0–0.2)
BASOPHILS NFR BLD: 1.1 % (ref 0–1.9)
BILIRUB SERPL-MCNC: 0.9 MG/DL (ref 0.3–1.2)
BUN SERPL-MCNC: 10 MG/DL (ref 8–23)
CALCIUM SERPL-MCNC: 9.7 MG/DL (ref 8.6–10)
CHLORIDE SERPL-SCNC: 102 MMOL/L (ref 101–111)
CHOLEST SERPL-MCNC: 304 MG/DL (ref 80–200)
CHOLEST/HDLC SERPL: 3.9 {RATIO} (ref 2–5)
CO2 SERPL-SCNC: 28 MMOL/L (ref 23–29)
CREAT SERPL-MCNC: 0.7 MG/DL (ref 0.5–1.4)
DIFFERENTIAL METHOD: ABNORMAL
EOSINOPHIL # BLD AUTO: 0.2 K/UL (ref 0–0.5)
EOSINOPHIL NFR BLD: 3 % (ref 0–8)
ERYTHROCYTE [DISTWIDTH] IN BLOOD BY AUTOMATED COUNT: 13.8 % (ref 11.5–14.5)
EST. GFR  (AFRICAN AMERICAN): >60 ML/MIN/1.73 M^2
EST. GFR  (NON AFRICAN AMERICAN): >60 ML/MIN/1.73 M^2
GLUCOSE SERPL-MCNC: 93 MG/DL (ref 74–118)
HCT VFR BLD AUTO: 42.3 % (ref 37–48.5)
HCV AB SERPL QL IA: NEGATIVE
HDLC SERPL-MCNC: 77 MG/DL (ref 40–75)
HDLC SERPL: 25.3 % (ref 20–50)
HGB BLD-MCNC: 13.9 G/DL (ref 12–16)
LDLC SERPL CALC-MCNC: 207 MG/DL
LYMPHOCYTES # BLD AUTO: 2.4 K/UL (ref 1–4.8)
LYMPHOCYTES NFR BLD: 36.7 % (ref 18–48)
MCH RBC QN AUTO: 30.6 PG (ref 27–31)
MCHC RBC AUTO-ENTMCNC: 32.8 G/DL (ref 32–36)
MCV RBC AUTO: 93 FL (ref 82–98)
MONOCYTES # BLD AUTO: 0.5 K/UL (ref 0.3–1)
MONOCYTES NFR BLD: 7.8 % (ref 4–15)
NEUTROPHILS # BLD AUTO: 3.4 K/UL (ref 1.8–7.7)
NEUTROPHILS NFR BLD: 51.4 % (ref 38–73)
NONHDLC SERPL-MCNC: 227 MG/DL
PLATELET # BLD AUTO: 371 K/UL (ref 150–350)
PMV BLD AUTO: 8.9 FL (ref 9.2–12.9)
POTASSIUM SERPL-SCNC: 3.8 MMOL/L (ref 3.5–5.1)
PROT SERPL-MCNC: 7.5 G/DL (ref 6–8.4)
PTH-INTACT SERPL-MCNC: 48 PG/ML (ref 9–77)
RBC # BLD AUTO: 4.54 M/UL (ref 4–5.4)
SODIUM SERPL-SCNC: 139 MMOL/L (ref 136–145)
TRIGL SERPL-MCNC: 100 MG/DL (ref 30–150)
TSH SERPL DL<=0.005 MIU/L-ACNC: 4.17 UIU/ML (ref 0.45–5.33)
VIT B12 SERPL-MCNC: 236 PG/ML (ref 180–914)
WBC # BLD AUTO: 6.6 K/UL (ref 3.9–12.7)

## 2019-10-21 PROCEDURE — 99499 RISK ADDL DX/OHS AUDIT: ICD-10-PCS | Mod: S$GLB,,, | Performed by: FAMILY MEDICINE

## 2019-10-21 PROCEDURE — 80061 LIPID PANEL: CPT

## 2019-10-21 PROCEDURE — 90662 FLU VACCINE - HIGH DOSE (65+) PRESERVATIVE FREE IM: ICD-10-PCS | Mod: HCNC,S$GLB,, | Performed by: FAMILY MEDICINE

## 2019-10-21 PROCEDURE — 3288F FALL RISK ASSESSMENT DOCD: CPT | Mod: HCNC,CPTII,S$GLB, | Performed by: FAMILY MEDICINE

## 2019-10-21 PROCEDURE — 36415 COLL VENOUS BLD VENIPUNCTURE: CPT | Mod: HCNC,S$GLB,, | Performed by: FAMILY MEDICINE

## 2019-10-21 PROCEDURE — 90471 PNEUMOCOCCAL CONJUGATE VACCINE 13-VALENT LESS THAN 5YO & GREATER THAN: ICD-10-PCS | Mod: HCNC,S$GLB,, | Performed by: FAMILY MEDICINE

## 2019-10-21 PROCEDURE — 90471 IMMUNIZATION ADMIN: CPT | Mod: HCNC,S$GLB,, | Performed by: FAMILY MEDICINE

## 2019-10-21 PROCEDURE — 99214 OFFICE O/P EST MOD 30 MIN: CPT | Mod: HCNC,25,S$GLB, | Performed by: FAMILY MEDICINE

## 2019-10-21 PROCEDURE — G0008 ADMIN INFLUENZA VIRUS VAC: HCPCS | Mod: HCNC,59,S$GLB, | Performed by: FAMILY MEDICINE

## 2019-10-21 PROCEDURE — 36415 PR COLLECTION VENOUS BLOOD,VENIPUNCTURE: ICD-10-PCS | Mod: HCNC,S$GLB,, | Performed by: FAMILY MEDICINE

## 2019-10-21 PROCEDURE — 99214 PR OFFICE/OUTPT VISIT, EST, LEVL IV, 30-39 MIN: ICD-10-PCS | Mod: HCNC,25,S$GLB, | Performed by: FAMILY MEDICINE

## 2019-10-21 PROCEDURE — 85025 COMPLETE CBC W/AUTO DIFF WBC: CPT

## 2019-10-21 PROCEDURE — 99499 UNLISTED E&M SERVICE: CPT | Mod: S$GLB,,, | Performed by: FAMILY MEDICINE

## 2019-10-21 PROCEDURE — 82607 VITAMIN B-12: CPT

## 2019-10-21 PROCEDURE — 83970 ASSAY OF PARATHORMONE: CPT | Mod: HCNC

## 2019-10-21 PROCEDURE — 90670 PNEUMOCOCCAL CONJUGATE VACCINE 13-VALENT LESS THAN 5YO & GREATER THAN: ICD-10-PCS | Mod: HCNC,S$GLB,, | Performed by: FAMILY MEDICINE

## 2019-10-21 PROCEDURE — 90670 PCV13 VACCINE IM: CPT | Mod: HCNC,S$GLB,, | Performed by: FAMILY MEDICINE

## 2019-10-21 PROCEDURE — G0009 TD VACCINE GREATER THAN OR EQUAL TO 7YO PRESERVATIVE FREE IM: ICD-10-PCS | Mod: HCNC,59,S$GLB, | Performed by: FAMILY MEDICINE

## 2019-10-21 PROCEDURE — 90714 TD VACC NO PRESV 7 YRS+ IM: CPT | Mod: HCNC,S$GLB,, | Performed by: FAMILY MEDICINE

## 2019-10-21 PROCEDURE — 1100F PR PT FALLS ASSESS DOC 2+ FALLS/FALL W/INJURY/YR: ICD-10-PCS | Mod: HCNC,CPTII,S$GLB, | Performed by: FAMILY MEDICINE

## 2019-10-21 PROCEDURE — 3288F PR FALLS RISK ASSESSMENT DOCUMENTED: ICD-10-PCS | Mod: HCNC,CPTII,S$GLB, | Performed by: FAMILY MEDICINE

## 2019-10-21 PROCEDURE — 90662 IIV NO PRSV INCREASED AG IM: CPT | Mod: HCNC,S$GLB,, | Performed by: FAMILY MEDICINE

## 2019-10-21 PROCEDURE — 86803 HEPATITIS C AB TEST: CPT | Mod: HCNC

## 2019-10-21 PROCEDURE — 90714 TD VACCINE GREATER THAN OR EQUAL TO 7YO PRESERVATIVE FREE IM: ICD-10-PCS | Mod: HCNC,S$GLB,, | Performed by: FAMILY MEDICINE

## 2019-10-21 PROCEDURE — G0009 ADMIN PNEUMOCOCCAL VACCINE: HCPCS | Mod: HCNC,59,S$GLB, | Performed by: FAMILY MEDICINE

## 2019-10-21 PROCEDURE — G0008 FLU VACCINE - HIGH DOSE (65+) PRESERVATIVE FREE IM: ICD-10-PCS | Mod: HCNC,59,S$GLB, | Performed by: FAMILY MEDICINE

## 2019-10-21 PROCEDURE — 1100F PTFALLS ASSESS-DOCD GE2>/YR: CPT | Mod: HCNC,CPTII,S$GLB, | Performed by: FAMILY MEDICINE

## 2019-10-21 PROCEDURE — 80053 COMPREHEN METABOLIC PANEL: CPT

## 2019-10-21 PROCEDURE — 99999 PR PBB SHADOW E&M-EST. PATIENT-LVL IV: ICD-10-PCS | Mod: PBBFAC,HCNC,, | Performed by: FAMILY MEDICINE

## 2019-10-21 PROCEDURE — 82306 VITAMIN D 25 HYDROXY: CPT | Mod: HCNC

## 2019-10-21 PROCEDURE — 99999 PR PBB SHADOW E&M-EST. PATIENT-LVL IV: CPT | Mod: PBBFAC,HCNC,, | Performed by: FAMILY MEDICINE

## 2019-10-21 PROCEDURE — 84443 ASSAY THYROID STIM HORMONE: CPT

## 2019-10-21 NOTE — PROGRESS NOTES
"Subjective:       Patient ID: Concha Hess is a 68 y.o. female.    Chief Complaint: Immunizations (says she was told she needed vaccines ) and Memory Loss (says she had the concusion she is having memory issues )    Fell off ladder at home earlier this year, sustained concussion with loss of consciousness, small intracranial hemorrhage.  Observed in the hospital overnight with gradual improvement in neurologic deficits.  Headaches resolved after a week or so.  She reports that she is generally feeling back to her baseline functional status with the exception of some mild cognitive slowing.  She feels like she is not firing on all cylinders," though she is ultimately able to recall what ever is she was trying to think of.  No dysarthria, numbness, weakness or balance disturbance.    Review of Systems   Constitutional: Negative for chills, fatigue and fever.   HENT: Positive for congestion, postnasal drip and tinnitus.    Eyes: Negative for visual disturbance.   Respiratory: Negative for cough and shortness of breath.    Cardiovascular: Negative for chest pain.   Gastrointestinal: Negative for abdominal pain, nausea and vomiting.   Genitourinary: Negative for difficulty urinating.   Musculoskeletal: Negative for arthralgias.   Skin: Negative for rash.   Allergic/Immunologic: Negative for immunocompromised state.   Neurological: Negative for dizziness, seizures, syncope and weakness.   Hematological: Does not bruise/bleed easily.   Psychiatric/Behavioral: Negative for agitation and sleep disturbance.       Objective:      Vitals:    10/21/19 0820   BP: 118/82   BP Location: Left arm   Patient Position: Sitting   BP Method: Medium (Manual)   Pulse: 75   Resp: 18   Temp: 98 °F (36.7 °C)   TempSrc: Oral   SpO2: 95%   Weight: 67.6 kg (149 lb)   Height: 5' 2" (1.575 m)     Physical Exam   Constitutional: She is oriented to person, place, and time. She appears well-developed and well-nourished.   HENT:   Head: " Normocephalic and atraumatic.   Mouth/Throat: Oropharynx is clear and moist.   Eyes: Pupils are equal, round, and reactive to light. EOM are normal.   Neck: No JVD present. Carotid bruit is not present.   Cardiovascular: Normal rate, regular rhythm and normal heart sounds.   Pulmonary/Chest: Effort normal and breath sounds normal.   Musculoskeletal: She exhibits no edema.   Neurological: She is alert and oriented to person, place, and time. She has normal strength. She displays no tremor. No cranial nerve deficit or sensory deficit. She displays a negative Romberg sign. Coordination and gait normal.   Skin: Skin is warm and dry.   Psychiatric: She has a normal mood and affect. Her behavior is normal. Judgment and thought content normal.   Nursing note and vitals reviewed.      Assessment:       1. Mild cognitive impairment    2. Postconcussive syndrome    3. Need for vaccination    4. Vitamin D deficiency    5. Hyperlipidemia, unspecified hyperlipidemia type    6. Need for hepatitis C screening test    7. Pulmonary nodule, right    8. Lung nodule    9. Unspecified symptoms and signs involving cognitive functions and awareness         Plan:       Mild cognitive impairment  -     CBC auto differential; Future; Expected date: 10/21/2019  -     TSH; Future; Expected date: 10/21/2019  -     Vitamin B12; Future; Expected date: 10/21/2019  -     Ambulatory Referral to Neurology  Her symptoms are all likely sequela of her TBI.  As such, hopefully should gradually resolve, though some risk of residual deficit.  Postconcussive syndrome  -     Ambulatory Referral to Neurology    Need for vaccination  -     Influenza - High Dose (65+) (PF) (IM)  -     Td Vaccine (Adult) - Preservative Free  -     Pneumococcal Conjugate Vaccine (13 Valent) (IM)  -     varicella-zoster [id number]-AS01B, PF, (SHINGRIX, PF,) 50 mcg/0.5 mL injection; Inject 0.5 mLs into the muscle once. for 1 dose  Dispense: 0.5 mL; Refill: 0    Vitamin D deficiency  -      Vitamin D; Future; Expected date: 10/21/2019  -     PTH, intact; Future; Expected date: 10/21/2019    Hyperlipidemia, unspecified hyperlipidemia type  -     Comprehensive metabolic panel; Future; Expected date: 10/21/2019  -     Lipid panel; Future; Expected date: 10/21/2019    Need for hepatitis C screening test  -     Hepatitis C antibody; Future; Expected date: 10/21/2019    Pulmonary nodule, right  -     CT Chest Without Contrast; Future; Expected date: 10/21/2019    Lung nodule  -     CT Chest Without Contrast; Future; Expected date: 10/21/2019    Unspecified symptoms and signs involving cognitive functions and awareness   -     Vitamin B12; Future; Expected date: 10/21/2019      Medication List with Changes/Refills   New Medications    VARICELLA-ZOSTER GE-AS01B, PF, (SHINGRIX, PF,) 50 MCG/0.5 ML INJECTION    Inject 0.5 mLs into the muscle once. for 1 dose   Current Medications    ALBUTEROL (VENTOLIN HFA) 90 MCG/ACTUATION INHALER    Inhale 2 puffs into the lungs every 4 (four) hours as needed for Wheezing or Shortness of Breath. Rescue    BREO ELLIPTA 200-25 MCG/DOSE DSDV DISKUS INHALER    INHALE 1 PUFF BY MOUTH EVERY DAY    CALCIUM CARBONATE-VIT D3- MG CALCIUM- 400 UNIT TAB    Take 1 tablet by mouth 2 (two) times daily.    CETIRIZINE 10 MG CHEWABLE TABLET    Take 10 mg by mouth once daily.    CLORAZEPATE (TRANXENE) 7.5 MG TAB    Take 1 tablet (7.5 mg total) by mouth 2 (two) times daily.    ESCITALOPRAM OXALATE (LEXAPRO) 10 MG TABLET    TAKE 1 TABLET(10 MG) BY MOUTH EVERY DAY    ESOMEPRAZOLE (NEXIUM) 20 MG CAPSULE    Take 20 mg by mouth before breakfast.    ESTRADIOL (ESTRACE) 0.01 % (0.1 MG/GRAM) VAGINAL CREAM    Use 0.5 gram of estrogen cream in vagina nightly x 2 weeks, then twice a week thereafter    FLUTICASONE (FLONASE) 50 MCG/ACTUATION NASAL SPRAY    2 sprays (100 mcg total) by Each Nare route once daily.    MULTIVITS-MIN/IRON/FA/LUTEIN (ULTIMATE WOMEN'S COMPLETE 50+ ORAL)    Take by mouth.     ONDANSETRON (ZOFRAN) 4 MG TABLET    Take 1 tablet (4 mg total) by mouth every 8 (eight) hours as needed for Nausea.   Discontinued Medications    ALBUTEROL-IPRATROPIUM (DUO-NEB) 2.5 MG-0.5 MG/3 ML NEBULIZER SOLUTION    Take 3 mLs by nebulization every 6 (six) hours as needed for Wheezing or Shortness of Breath. Rescue    AZITHROMYCIN (ZITHROMAX Z-SANDY) 250 MG TABLET    2 tabs day 1, then 1 tab daily x 4 days    HYDROCODONE-ACETAMINOPHEN (NORCO) 5-325 MG PER TABLET    Take 1 tablet by mouth every 6 (six) hours as needed for Pain.    MIRABEGRON (MYRBETRIQ) 50 MG TB24    Take 1 tablet (50 mg total) by mouth once daily.    PREDNISONE (DELTASONE) 10 MG TABLET    Take 3 tablets x 7 days then 2 tablets x 7days

## 2019-10-21 NOTE — PROGRESS NOTES
Verified pt ID using name and . Verified allergies with patient. Administered high dose flu vaccine and TD in left deltoid, and prevnar 13 in right deltoid per physician order using aseptic technique. Aspirated and no blood return noted. Pt tolerated well with no adverse reactions noted.

## 2019-11-01 DIAGNOSIS — J40 SINOBRONCHITIS: ICD-10-CM

## 2019-11-01 DIAGNOSIS — J32.9 SINOBRONCHITIS: ICD-10-CM

## 2019-11-01 RX ORDER — FLUTICASONE PROPIONATE 50 MCG
SPRAY, SUSPENSION (ML) NASAL
Qty: 48 ML | Refills: 0 | OUTPATIENT
Start: 2019-11-01

## 2019-11-06 DIAGNOSIS — E53.8 B12 DEFICIENCY: Primary | ICD-10-CM

## 2019-11-06 DIAGNOSIS — E78.5 HYPERLIPIDEMIA, UNSPECIFIED HYPERLIPIDEMIA TYPE: ICD-10-CM

## 2019-11-06 RX ORDER — MULTIVIT WITH MINERALS/HERBS
1 TABLET ORAL DAILY
COMMUNITY
Start: 2019-11-06 | End: 2021-01-21 | Stop reason: SDUPTHER

## 2019-11-18 DIAGNOSIS — E53.8 B12 DEFICIENCY: ICD-10-CM

## 2019-11-18 DIAGNOSIS — E78.5 HYPERLIPIDEMIA, UNSPECIFIED HYPERLIPIDEMIA TYPE: Primary | ICD-10-CM

## 2019-11-18 RX ORDER — PRAVASTATIN SODIUM 10 MG/1
10 TABLET ORAL DAILY
Qty: 90 TABLET | Refills: 1 | Status: SHIPPED | OUTPATIENT
Start: 2019-11-18 | End: 2020-03-05

## 2020-02-04 DIAGNOSIS — N95.2 VAGINAL ATROPHY: ICD-10-CM

## 2020-02-04 DIAGNOSIS — N36.2 URETHRAL CARUNCLE: ICD-10-CM

## 2020-02-05 RX ORDER — ESTRADIOL 0.1 MG/G
CREAM VAGINAL
Qty: 42.5 G | Refills: 5 | Status: SHIPPED | OUTPATIENT
Start: 2020-02-05 | End: 2022-06-02

## 2020-02-11 ENCOUNTER — OFFICE VISIT (OUTPATIENT)
Dept: URGENT CARE | Facility: CLINIC | Age: 70
End: 2020-02-11
Payer: MEDICARE

## 2020-02-11 VITALS
OXYGEN SATURATION: 97 % | HEIGHT: 62 IN | SYSTOLIC BLOOD PRESSURE: 118 MMHG | RESPIRATION RATE: 16 BRPM | WEIGHT: 149 LBS | BODY MASS INDEX: 27.42 KG/M2 | HEART RATE: 81 BPM | DIASTOLIC BLOOD PRESSURE: 83 MMHG | TEMPERATURE: 98 F

## 2020-02-11 DIAGNOSIS — J01.40 ACUTE PANSINUSITIS, RECURRENCE NOT SPECIFIED: Primary | ICD-10-CM

## 2020-02-11 PROCEDURE — 99214 OFFICE O/P EST MOD 30 MIN: CPT | Mod: S$GLB,,, | Performed by: EMERGENCY MEDICINE

## 2020-02-11 PROCEDURE — 99214 PR OFFICE/OUTPT VISIT, EST, LEVL IV, 30-39 MIN: ICD-10-PCS | Mod: S$GLB,,, | Performed by: EMERGENCY MEDICINE

## 2020-02-11 RX ORDER — FLUTICASONE FUROATE AND VILANTEROL TRIFENATATE 100; 25 UG/1; UG/1
POWDER RESPIRATORY (INHALATION)
COMMUNITY
Start: 2019-11-27 | End: 2020-02-24

## 2020-02-11 RX ORDER — BENZONATATE 100 MG/1
100 CAPSULE ORAL EVERY 6 HOURS PRN
Qty: 40 CAPSULE | Refills: 1 | Status: SHIPPED | OUTPATIENT
Start: 2020-02-11 | End: 2020-02-25

## 2020-02-11 NOTE — PROGRESS NOTES
"Subjective:       Patient ID: Concha Hess is a 69 y.o. female.    Vitals:  height is 5' 2" (1.575 m) and weight is 67.6 kg (149 lb). Her temperature is 97.5 °F (36.4 °C). Her blood pressure is 118/83 and her pulse is 81. Her respiration is 16 and oxygen saturation is 97%.     Chief Complaint: Sinus Problem    Pt states sinus congestion and pressure x 3-4 days.    Sinus Problem   This is a new problem. The current episode started in the past 7 days. The problem has been gradually worsening since onset. There has been no fever. Associated symptoms include congestion, headaches and sinus pressure. Pertinent negatives include no chills, coughing, shortness of breath or sore throat. Past treatments include spray decongestants (MUCINEX,,FLONASE). The treatment provided mild relief.       Constitution: Negative for chills, fatigue and fever.   HENT: Positive for congestion, postnasal drip, sinus pain and sinus pressure. Negative for sore throat.    Neck: Negative for painful lymph nodes.   Cardiovascular: Negative for chest pain and leg swelling.   Eyes: Negative for double vision and blurred vision.   Respiratory: Negative for cough and shortness of breath.    Gastrointestinal: Negative for nausea, vomiting and diarrhea.   Genitourinary: Negative for dysuria, frequency, urgency and history of kidney stones.   Musculoskeletal: Negative for joint pain, joint swelling, muscle cramps and muscle ache.   Skin: Negative for color change, pale, rash and bruising.   Allergic/Immunologic: Negative for seasonal allergies.   Neurological: Positive for headaches. Negative for dizziness, history of vertigo, light-headedness and passing out.   Hematologic/Lymphatic: Negative for swollen lymph nodes.   Psychiatric/Behavioral: Negative for nervous/anxious, sleep disturbance and depression. The patient is not nervous/anxious.        Objective:      Physical Exam   Constitutional: She is oriented to person, place, and time. She " appears well-developed and well-nourished. She is cooperative.  Non-toxic appearance. She does not have a sickly appearance. She does not appear ill. No distress.   HENT:   Head: Normocephalic and atraumatic.   Right Ear: Hearing, tympanic membrane, external ear and ear canal normal.   Left Ear: Hearing, tympanic membrane, external ear and ear canal normal.   Nose: Mucosal edema and rhinorrhea present. No nasal deformity. No epistaxis. Right sinus exhibits maxillary sinus tenderness and frontal sinus tenderness. Left sinus exhibits maxillary sinus tenderness and frontal sinus tenderness.   Mouth/Throat: Uvula is midline, oropharynx is clear and moist and mucous membranes are normal. No trismus in the jaw. Normal dentition. No uvula swelling. No oropharyngeal exudate, posterior oropharyngeal edema or posterior oropharyngeal erythema.   Eyes: Conjunctivae and lids are normal. No scleral icterus.   Neck: Trachea normal, full passive range of motion without pain and phonation normal. Neck supple. No neck rigidity. No edema and no erythema present.   Cardiovascular: Normal rate, regular rhythm, normal heart sounds, intact distal pulses and normal pulses.   Pulmonary/Chest: Effort normal and breath sounds normal. No respiratory distress. She has no decreased breath sounds. She has no rhonchi.   Abdominal: Normal appearance.   Musculoskeletal: Normal range of motion. She exhibits no edema or deformity.   Neurological: She is alert and oriented to person, place, and time. She exhibits normal muscle tone. Coordination normal.   Skin: Skin is warm, dry, intact, not diaphoretic and not pale.   Psychiatric: She has a normal mood and affect. Her speech is normal and behavior is normal. Judgment and thought content normal. Cognition and memory are normal.   Nursing note and vitals reviewed.        Assessment:       1. Acute pansinusitis, recurrence not specified        Plan:         Acute pansinusitis, recurrence not  specified    Other orders  -     benzonatate (TESSALON PERLES) 100 MG capsule; Take 1 capsule (100 mg total) by mouth every 6 (six) hours as needed for Cough.  Dispense: 40 capsule; Refill: 1          Patient Instructions   Please return here or go to the Emergency Department for any concerns or worsening of condition      Start taking antibiotics if not improved in 3 days    Zyrtec, Claritin, or Allegra OTC as directed for the next 7 days    Flonase OTC as directed for the next 7 days    Salt Water Nasal Spray OTC 3x/day for the next 7 days    Tylenol 500mg 2 tabs by mouth every 8 hours  Motrin 200mg 2 tabs by mouth every 8 hours  Alternate Tylenol and Motrin every 4hours    Mucinex or Robitussin OTC as directed for cough    Sudafed as directed for runny nose and congestion        Follow up with Primary Care or ENT if not improved in 7-10 Days:  [id number]      Sinusitis (No Antibiotics)    The sinuses are air-filled spaces within the bones of the face. They connect to the inside of the nose. Sinusitis is an inflammation of the tissue lining the sinus cavity. Sinus inflammation can occur during a cold. It can also be due to allergies to pollens and other particles in the air. It can cause symptoms such as sinus congestion, headache, sore throat, facial swelling and fullness. It may also cause a low-grade fever. No infection is present, and no antibiotic treatment is needed.  Home care  · Drink plenty of water, hot tea, and other liquids. This may help thin mucus. It also may promote sinus drainage.  · Heat may help soothe painful areas of the face. Use a towel soaked in hot water. Or,  the shower and direct the hot spray onto your face. Using a vaporizer along with a menthol rub at night may also help.   · An expectorant containing guaifenesin may help thin the mucus and promote drainage from the sinuses.  · Over-the-counter decongestants may be used unless a similar medicine was prescribed. Nasal sprays work  the fastest. Use one that contains phenylephrine or oxymetazoline. First blow the nose gently. Then use the spray. Do not use these medicines more often than directed on the label or symptoms may get worse. You may also use tablets containing pseudoephedrine. Avoid products that combine ingredients, because side effects may be increased. Read labels. You can also ask the pharmacist for help. (NOTE: Persons with high blood pressure should not use decongestants. They can raise blood pressure.)  · Over-the-counter antihistamines may help if allergies contributed to your sinusitis.    · Use acetaminophen or ibuprofen to control pain, unless another pain medicine was prescribed. (If you have chronic liver or kidney disease or ever had a stomach ulcer, talk with your doctor before using these medicines. Aspirin should never be used in anyone under 18 years of age who is ill with a fever. It may cause severe liver damage.)  · Use nasal rinses or irrigation as instructed by your health care provider.  · Don't smoke. This can worsen symptoms.  Follow-up care  Follow up with your healthcare provider or our staff if you are not improving within the next week.  When to seek medical advice  Call your healthcare provider if any of these occur:  · Green or yellow discharge from the nose or into the throat  · Facial pain or headache becoming more severe  · Stiff neck  · Unusual drowsiness or confusion  · Swelling of the forehead or eyelids  · Vision problems, including blurred or double vision  · Fever of 100.4ºF (38ºC) or higher, or as directed by your healthcare provider  · Seizure  · Breathing problems  · Symptoms not resolving within 10 days  Date Last Reviewed: 4/13/2015  © 2184-4135 [employer]. [address] [place] [address], [place] [address]. All rights reserved. This information is not intended as a substitute for professional medical care. Always follow your healthcare professional's instructions.      When to Use  Antibiotics   Antibiotics are medicines used to treat infections caused by bacteria. They dont work for illnesses caused by viruses or an allergic reaction. In fact, taking antibiotics for reasons other than a bacterial infection can cause problems. For example, you may have side effects from the medicine. And if you really need an antibiotic, it may not work well.                                                                                                                                              When antibiotics wont help  Your healthcare provider wont usually prescribe antibiotics for the following conditions. You can help by not asking for them if you have:   · A cold. This type of illness is caused by a virus. It can cause a runny nose, stuffed-up nose, sneezing, coughing, headache, mild body aches, and low fever. A cold gets better on its own in a few days to a week.  · The flu (influenza). This is a respiratory illness caused by a virus. The flu usually goes away on its own in a week or so. It can cause fever, body aches, sore throat, and fatigue.  · Bronchitis. This is an infection in the lungs most often caused by a virus. You may have coughing, phlegm, body aches, and a low fever. A common type of bronchitis is known as a chest cold (acute bronchitis). This often happens after you have a respiratory infection like a common cold. Bronchitis can take weeks to go away, but antibiotics usually dont help.  · Most sore throats. Sore throats are most often caused by viruses. Your throat may feel scratchy or achy, and it may hurt to swallow. You may also have a low fever and body aches. A sore throat usually gets better in a few days.  · Most ear infections. An ear infection may be caused by a virus or bacteria. It causes pain in the ear. Antibiotics usually dont help, and the infection goes away on its own.  · Most sinus infections (sinusitis). This kind of infection causes sinus pain and swelling, and  a runny nose. In most cases, sinusitis goes away on its own, and antibiotics dont make recovery quicker.  · Allergic rhinitis. This is a set of symptoms caused by an allergic reaction. You may have sneezing, a runny nose, itchy or watery eyes, or a sore throat. Allergies are not treated with antibiotics.  · Low fever. A mild fever thats less than 100.4°F (38°C) most likely doesnt need treatment with antibiotics.   When antibiotics can help   Antibiotics can be used to treat:                                                     · Strep throat. This is a throat infectioncaused by a certain type of bacteria. Symptoms of strep throat include a sore throat, white patches on the tonsils, red spots on the roof of the mouth, fever, body aches, and nausea and vomiting.  · Urinary tract infection (UTI). This is a bacterial infection of the bladder and the tube that takes urine out of the body. It can cause burning pain and urine thats cloudy or tinted with blood. UTIs are very common. Antibiotics usually help treat these infections.  · Some ear infections. In some cases, a healthcare provider may prescribe antibiotics for an ear infection. You may need a test to show whats causing the ear infection.  · Some sinus infections. In some cases, yourhealthcare provider may give you antibiotics. He or she may first need to make sure your symptoms arent caused by a virus, fungus, allergies, or air pollutants such as smoke.   Your doctor may also recommend antibiotics if you have a condition that can affect your immune system, such as diabetes or cancer.   Self-care at home   If your infection cant be treated with antibiotics, you can take other steps to feel better. Try the remedies below. In general:   · Rest and sleep as much as needed.  · Drink water and other clear fluids.  · Dont smoke, and avoid smoke from other people.  · Use over-the-counter medicine such as acetaminophen to ease pain or fever, as directed by your  "healthcare provider.   To treat sinus pain or nasal congestion:   · Put a warm, moist washcloth on your face where you feel sinus pain or pressure.  · Use a nasal spray with medicine or saline, as directed by your healthcare provider.  · Breathe in steam from a hot shower.  · Use a humidifier or cool mist vaporizer.   To quiet a cough:   · Use a humidifier or cool mist vaporizer.  · Breathe in steam from a hot shower.  · Use cough lozenges.   To sooth a sore throat:   · Suck on ice chips, popsicles, or lozenges.  · Use a sore throat spray.  · Use a humidifier or cool mist vaporizer.  · Gargle with saltwater.  · Drink warm liquids.   To ease ear pain:   · Hold a warm, moist washcloth on the ear for 10 minutes at a time.  Date Last Reviewed: 9/1/2016  © 1949-0831 [employer]. [address] [place] [address]. All rights reserved. This information is not intended as a substitute for professional medical care. Always follow your healthcare professional's instructions.           Understanding Nasal Anatomy: Inside View  A lot happens under the surface of the nose. The bone and cartilage under the skin give the nose most of its size and shape. Other structures inside and behind the nose help you breathe. Learning the anatomy of the nose can help you better understand how the nose works.       Bone supports the upper third (bridge) of the nose. The upper cartilage supports the side of the nose. The lower cartilage adds support, width, and height. It helps shape the nostrils and the tip of the nose. Skin also helps shape the nose.          The nasal cavity is a hollow space behind the nose that air flows through. The septum is a thin "wall" made of cartilage and bone. It divides the inside of the nose into two chambers. The mucous membrane is thin tissue that lines the nose, sinuses, and throat. It warms and moistens the air you breathe in. It also makes the sticky mucus that helps clean the air of " dust and other small particles. The turbinates on each side of the nose are curved, bony ridges lined with mucous membrane. They warm and moisten the air you breathe in. The sinuses are hollow, air-filled chambers in the bone around your nose. Mucus from the sinuses drains into the nasal cavity.  Date Last Reviewed: 11/1/2016  © 4067-3835 [employer]. [address] [place] [address]. All rights reserved. This information is not intended as a substitute for professional medical care. Always follow your healthcare professional's instructions.

## 2020-02-11 NOTE — PATIENT INSTRUCTIONS
Please return here or go to the Emergency Department for any concerns or worsening of condition      Start taking antibiotics if not improved in 3 days    Zyrtec, Claritin, or Allegra OTC as directed for the next 7 days    Flonase OTC as directed for the next 7 days    Salt Water Nasal Spray OTC 3x/day for the next 7 days    Tylenol 500mg 2 tabs by mouth every 8 hours  Motrin 200mg 2 tabs by mouth every 8 hours  Alternate Tylenol and Motrin every 4hours    Mucinex or Robitussin OTC as directed for cough    Sudafed as directed for runny nose and congestion        Follow up with Primary Care or ENT if not improved in 7-10 Days:        Sinusitis (No Antibiotics)    The sinuses are air-filled spaces within the bones of the face. They connect to the inside of the nose. Sinusitis is an inflammation of the tissue lining the sinus cavity. Sinus inflammation can occur during a cold. It can also be due to allergies to pollens and other particles in the air. It can cause symptoms such as sinus congestion, headache, sore throat, facial swelling and fullness. It may also cause a low-grade fever. No infection is present, and no antibiotic treatment is needed.  Home care  · Drink plenty of water, hot tea, and other liquids. This may help thin mucus. It also may promote sinus drainage.  · Heat may help soothe painful areas of the face. Use a towel soaked in hot water. Or,  the shower and direct the hot spray onto your face. Using a vaporizer along with a menthol rub at night may also help.   · An expectorant containing guaifenesin may help thin the mucus and promote drainage from the sinuses.  · Over-the-counter decongestants may be used unless a similar medicine was prescribed. Nasal sprays work the fastest. Use one that contains phenylephrine or oxymetazoline. First blow the nose gently. Then use the spray. Do not use these medicines more often than directed on the label or symptoms may get worse. You may also use  tablets containing pseudoephedrine. Avoid products that combine ingredients, because side effects may be increased. Read labels. You can also ask the pharmacist for help. (NOTE: Persons with high blood pressure should not use decongestants. They can raise blood pressure.)  · Over-the-counter antihistamines may help if allergies contributed to your sinusitis.    · Use acetaminophen or ibuprofen to control pain, unless another pain medicine was prescribed. (If you have chronic liver or kidney disease or ever had a stomach ulcer, talk with your doctor before using these medicines. Aspirin should never be used in anyone under 18 years of age who is ill with a fever. It may cause severe liver damage.)  · Use nasal rinses or irrigation as instructed by your health care provider.  · Don't smoke. This can worsen symptoms.  Follow-up care  Follow up with your healthcare provider or our staff if you are not improving within the next week.  When to seek medical advice  Call your healthcare provider if any of these occur:  · Green or yellow discharge from the nose or into the throat  · Facial pain or headache becoming more severe  · Stiff neck  · Unusual drowsiness or confusion  · Swelling of the forehead or eyelids  · Vision problems, including blurred or double vision  · Fever of 100.4ºF (38ºC) or higher, or as directed by your healthcare provider  · Seizure  · Breathing problems  · Symptoms not resolving within 10 days  Date Last Reviewed: 4/13/2015  © 3333-5017 [employer]. [address] [place] [address]. All rights reserved. This information is not intended as a substitute for professional medical care. Always follow your healthcare professional's instructions.      When to Use Antibiotics   Antibiotics are medicines used to treat infections caused by bacteria. They dont work for illnesses caused by viruses or an allergic reaction. In fact, taking antibiotics for reasons other than a bacterial  infection can cause problems. For example, you may have side effects from the medicine. And if you really need an antibiotic, it may not work well.                                                                                                                                              When antibiotics wont help  Your healthcare provider wont usually prescribe antibiotics for the following conditions. You can help by not asking for them if you have:   · A cold. This type of illness is caused by a virus. It can cause a runny nose, stuffed-up nose, sneezing, coughing, headache, mild body aches, and low fever. A cold gets better on its own in a few days to a week.  · The flu (influenza). This is a respiratory illness caused by a virus. The flu usually goes away on its own in a week or so. It can cause fever, body aches, sore throat, and fatigue.  · Bronchitis. This is an infection in the lungs most often caused by a virus. You may have coughing, phlegm, body aches, and a low fever. A common type of bronchitis is known as a chest cold (acute bronchitis). This often happens after you have a respiratory infection like a common cold. Bronchitis can take weeks to go away, but antibiotics usually dont help.  · Most sore throats. Sore throats are most often caused by viruses. Your throat may feel scratchy or achy, and it may hurt to swallow. You may also have a low fever and body aches. A sore throat usually gets better in a few days.  · Most ear infections. An ear infection may be caused by a virus or bacteria. It causes pain in the ear. Antibiotics usually dont help, and the infection goes away on its own.  · Most sinus infections (sinusitis). This kind of infection causes sinus pain and swelling, and a runny nose. In most cases, sinusitis goes away on its own, and antibiotics dont make recovery quicker.  · Allergic rhinitis. This is a set of symptoms caused by an allergic reaction. You may have sneezing, a runny  nose, itchy or watery eyes, or a sore throat. Allergies are not treated with antibiotics.  · Low fever. A mild fever thats less than 100.4°F (38°C) most likely doesnt need treatment with antibiotics.   When antibiotics can help   Antibiotics can be used to treat:                                                     · Strep throat. This is a throat infectioncaused by a certain type of bacteria. Symptoms of strep throat include a sore throat, white patches on the tonsils, red spots on the roof of the mouth, fever, body aches, and nausea and vomiting.  · Urinary tract infection (UTI). This is a bacterial infection of the bladder and the tube that takes urine out of the body. It can cause burning pain and urine thats cloudy or tinted with blood. UTIs are very common. Antibiotics usually help treat these infections.  · Some ear infections. In some cases, a healthcare provider may prescribe antibiotics for an ear infection. You may need a test to show whats causing the ear infection.  · Some sinus infections. In some cases, yourhealthcare provider may give you antibiotics. He or she may first need to make sure your symptoms arent caused by a virus, fungus, allergies, or air pollutants such as smoke.   Your doctor may also recommend antibiotics if you have a condition that can affect your immune system, such as diabetes or cancer.   Self-care at home   If your infection cant be treated with antibiotics, you can take other steps to feel better. Try the remedies below. In general:   · Rest and sleep as much as needed.  · Drink water and other clear fluids.  · Dont smoke, and avoid smoke from other people.  · Use over-the-counter medicine such as acetaminophen to ease pain or fever, as directed by your healthcare provider.   To treat sinus pain or nasal congestion:   · Put a warm, moist washcloth on your face where you feel sinus pain or pressure.  · Use a nasal spray with medicine or saline, as directed by your  "healthcare provider.  · Breathe in steam from a hot shower.  · Use a humidifier or cool mist vaporizer.   To quiet a cough:   · Use a humidifier or cool mist vaporizer.  · Breathe in steam from a hot shower.  · Use cough lozenges.   To sooth a sore throat:   · Suck on ice chips, popsicles, or lozenges.  · Use a sore throat spray.  · Use a humidifier or cool mist vaporizer.  · Gargle with saltwater.  · Drink warm liquids.   To ease ear pain:   · Hold a warm, moist washcloth on the ear for 10 minutes at a time.  Date Last Reviewed: 9/1/2016 © 2000-2016 [employer]. [address] [place] [address], [place] [address]. All rights reserved. This information is not intended as a substitute for professional medical care. Always follow your healthcare professional's instructions.           Understanding Nasal Anatomy: Inside View  A lot happens under the surface of the nose. The bone and cartilage under the skin give the nose most of its size and shape. Other structures inside and behind the nose help you breathe. Learning the anatomy of the nose can help you better understand how the nose works.       Bone supports the upper third (bridge) of the nose. The upper cartilage supports the side of the nose. The lower cartilage adds support, width, and height. It helps shape the nostrils and the tip of the nose. Skin also helps shape the nose.          The nasal cavity is a hollow space behind the nose that air flows through. The septum is a thin "wall" made of cartilage and bone. It divides the inside of the nose into two chambers. The mucous membrane is thin tissue that lines the nose, sinuses, and throat. It warms and moistens the air you breathe in. It also makes the sticky mucus that helps clean the air of dust and other small particles. The turbinates on each side of the nose are curved, bony ridges lined with mucous membrane. They warm and moisten the air you breathe in. The sinuses are hollow, air-filled chambers in " the bone around your nose. Mucus from the sinuses drains into the nasal cavity.  Date Last Reviewed: 11/1/2016  © 2302-9145 [employer]. [address] [place] [address], [place] [address]. All rights reserved. This information is not intended as a substitute for professional medical care. Always follow your healthcare professional's instructions.

## 2020-02-24 RX ORDER — CLORAZEPATE DIPOTASSIUM 7.5 MG/1
TABLET ORAL
Qty: 60 TABLET | Refills: 5 | Status: SHIPPED | OUTPATIENT
Start: 2020-02-24 | End: 2020-09-08

## 2020-02-24 RX ORDER — FLUTICASONE FUROATE AND VILANTEROL TRIFENATATE 100; 25 UG/1; UG/1
POWDER RESPIRATORY (INHALATION)
Qty: 60 EACH | Refills: 5 | Status: SHIPPED | OUTPATIENT
Start: 2020-02-24 | End: 2020-08-27

## 2020-03-04 ENCOUNTER — TELEPHONE (OUTPATIENT)
Dept: PRIMARY CARE CLINIC | Facility: CLINIC | Age: 70
End: 2020-03-04

## 2020-03-04 NOTE — TELEPHONE ENCOUNTER
----- Message from Chery Hu sent at 3/4/2020  4:19 PM CST -----  Contact: Patient [phone]  Returned Call    Who left the message: Joe Silva MA    When did the practice call: 03/04/2020 3:51pm    Please advise.    Thank You

## 2020-03-04 NOTE — TELEPHONE ENCOUNTER
----- Message from Freya Madrigal sent at 3/4/2020 12:21 PM CST -----  Contact: Patient  Type:  Sooner Apoointment Request    Caller is requesting a sooner appointment.  Caller declined first available appointment listed below.  Caller will not accept being placed on the waitlist and is requesting a message be sent to doctor.    Name of Caller:  Geraldine, patient  When is the first available appointment?  03/18/2020  Symptoms:  Bronchitis  Best Call Back Number:  [phone]  Additional Information:  Please call her. Thanks.

## 2020-03-05 ENCOUNTER — OFFICE VISIT (OUTPATIENT)
Dept: PRIMARY CARE CLINIC | Facility: CLINIC | Age: 70
End: 2020-03-05
Payer: MEDICARE

## 2020-03-05 VITALS
HEART RATE: 71 BPM | DIASTOLIC BLOOD PRESSURE: 72 MMHG | HEIGHT: 62 IN | SYSTOLIC BLOOD PRESSURE: 124 MMHG | BODY MASS INDEX: 28.2 KG/M2 | TEMPERATURE: 99 F | RESPIRATION RATE: 18 BRPM | OXYGEN SATURATION: 96 % | WEIGHT: 153.25 LBS

## 2020-03-05 DIAGNOSIS — J40 BRONCHITIS: Primary | ICD-10-CM

## 2020-03-05 DIAGNOSIS — J44.9 CHRONIC OBSTRUCTIVE PULMONARY DISEASE, UNSPECIFIED COPD TYPE: ICD-10-CM

## 2020-03-05 PROCEDURE — 1159F MED LIST DOCD IN RCRD: CPT | Mod: HCNC,S$GLB,, | Performed by: NURSE PRACTITIONER

## 2020-03-05 PROCEDURE — 1159F PR MEDICATION LIST DOCUMENTED IN MEDICAL RECORD: ICD-10-PCS | Mod: HCNC,S$GLB,, | Performed by: NURSE PRACTITIONER

## 2020-03-05 PROCEDURE — 1101F PR PT FALLS ASSESS DOC 0-1 FALLS W/OUT INJ PAST YR: ICD-10-PCS | Mod: HCNC,CPTII,S$GLB, | Performed by: NURSE PRACTITIONER

## 2020-03-05 PROCEDURE — 99999 PR PBB SHADOW E&M-EST. PATIENT-LVL IV: ICD-10-PCS | Mod: PBBFAC,HCNC,, | Performed by: NURSE PRACTITIONER

## 2020-03-05 PROCEDURE — 1126F PR PAIN SEVERITY QUANTIFIED, NO PAIN PRESENT: ICD-10-PCS | Mod: HCNC,S$GLB,, | Performed by: NURSE PRACTITIONER

## 2020-03-05 PROCEDURE — 1126F AMNT PAIN NOTED NONE PRSNT: CPT | Mod: HCNC,S$GLB,, | Performed by: NURSE PRACTITIONER

## 2020-03-05 PROCEDURE — 96372 THER/PROPH/DIAG INJ SC/IM: CPT | Mod: HCNC,S$GLB,, | Performed by: NURSE PRACTITIONER

## 2020-03-05 PROCEDURE — 99499 RISK ADDL DX/OHS AUDIT: ICD-10-PCS | Mod: HCNC,S$GLB,, | Performed by: NURSE PRACTITIONER

## 2020-03-05 PROCEDURE — 99499 UNLISTED E&M SERVICE: CPT | Mod: HCNC,S$GLB,, | Performed by: NURSE PRACTITIONER

## 2020-03-05 PROCEDURE — 99214 OFFICE O/P EST MOD 30 MIN: CPT | Mod: 25,HCNC,S$GLB, | Performed by: NURSE PRACTITIONER

## 2020-03-05 PROCEDURE — 1101F PT FALLS ASSESS-DOCD LE1/YR: CPT | Mod: HCNC,CPTII,S$GLB, | Performed by: NURSE PRACTITIONER

## 2020-03-05 PROCEDURE — 99214 PR OFFICE/OUTPT VISIT, EST, LEVL IV, 30-39 MIN: ICD-10-PCS | Mod: 25,HCNC,S$GLB, | Performed by: NURSE PRACTITIONER

## 2020-03-05 PROCEDURE — 99999 PR PBB SHADOW E&M-EST. PATIENT-LVL IV: CPT | Mod: PBBFAC,HCNC,, | Performed by: NURSE PRACTITIONER

## 2020-03-05 PROCEDURE — 96372 PR INJECTION,THERAP/PROPH/DIAG2ST, IM OR SUBCUT: ICD-10-PCS | Mod: HCNC,S$GLB,, | Performed by: NURSE PRACTITIONER

## 2020-03-05 RX ORDER — TRIAMCINOLONE ACETONIDE 40 MG/ML
40 INJECTION, SUSPENSION INTRA-ARTICULAR; INTRAMUSCULAR
Status: COMPLETED | OUTPATIENT
Start: 2020-03-05 | End: 2020-03-05

## 2020-03-05 RX ORDER — PREDNISONE 20 MG/1
TABLET ORAL
Qty: 10 TABLET | Refills: 0 | Status: SHIPPED | OUTPATIENT
Start: 2020-03-05 | End: 2020-03-12

## 2020-03-05 RX ORDER — PROMETHAZINE HYDROCHLORIDE AND DEXTROMETHORPHAN HYDROBROMIDE 6.25; 15 MG/5ML; MG/5ML
5 SYRUP ORAL EVERY 4 HOURS PRN
Qty: 118 ML | Refills: 0 | Status: SHIPPED | OUTPATIENT
Start: 2020-03-05 | End: 2020-03-15

## 2020-03-05 RX ADMIN — TRIAMCINOLONE ACETONIDE 40 MG: 40 INJECTION, SUSPENSION INTRA-ARTICULAR; INTRAMUSCULAR at 02:03

## 2020-03-05 NOTE — PROGRESS NOTES
Patient identified by name and date of birth, allergies reviewed, injection administered by aseptic technique, tolerated well by pt.

## 2020-03-05 NOTE — PROGRESS NOTES
Chief Complaint  Chief Complaint   Patient presents with    Cough     x 3 weeks       HPI    Concha Hess is a 69 y.o. female that presents for cough.    Patient reports the onset of symptoms approximately 5 weeks ago.  Reports that these symptoms occur yearly and generally occur after travel in an airplane.  Initial symptoms 2020 with viral URI symptoms.  Treated with antibiotics (amoxicillin) at that time for pansinusitis.  Was offered a steroid injection but she declined at the time. Reports an overall improvement in viral symptoms but cough has continued.  Worsening of symptoms on 2020 with a cough described as productive, green.  Associated wheezing and chest tightness.  Has been occurring for over the last 2 weeks.  No fever or chills.  Patient with history of COPD/asthma currently on Breo for control of her symptoms.  Also on albuterol for breakthrough wheezing.    PAST MEDICAL HISTORY:  Past Medical History:   Diagnosis Date    Acid reflux     Asthma     Chronic lung disease     COPD (chronic obstructive pulmonary disease)     Depression     Gastric ulcer     Hyperlipidemia     Paralysis of diaphragm     left    PONV (postoperative nausea and vomiting)        PAST SURGICAL HISTORY:  Past Surgical History:   Procedure Laterality Date    APPENDECTOMY      BREAST SURGERY      AUGMENTATION     SECTION      COSMETIC SURGERY      FACE LIFT breast reduction    HYSTEROSCOPY  2017       SOCIAL HISTORY:  Social History     Socioeconomic History    Marital status:      Spouse name: Not on file    Number of children: Not on file    Years of education: Not on file    Highest education level: Not on file   Occupational History    Not on file   Social Needs    Financial resource strain: Not on file    Food insecurity:     Worry: Not on file     Inability: Not on file    Transportation needs:     Medical: Not on file     Non-medical: Not on file   Tobacco Use     Smoking status: Former Smoker     Packs/day: 1.00     Years: 29.00     Pack years: 29.00     Types: Cigarettes    Smokeless tobacco: Never Used   Substance and Sexual Activity    Alcohol use: Yes    Drug use: No    Sexual activity: Not Currently   Lifestyle    Physical activity:     Days per week: Not on file     Minutes per session: Not on file    Stress: Not on file   Relationships    Social connections:     Talks on phone: Not on file     Gets together: Not on file     Attends [employer] service: Not on file     Active member of club or organization: Not on file     Attends meetings of clubs or organizations: Not on file     Relationship status: Not on file   Other Topics Concern    Not on file   Social History Narrative    Not on file       FAMILY HISTORY:  Family History   Problem Relation Age of Onset    Cancer Mother     Heart disease Father     Colon cancer Sister     Cervical cancer Neg Hx     Endometrial cancer Neg Hx     Vaginal cancer Neg Hx     Breast cancer Neg Hx     Ovarian cancer Neg Hx        ALLERGIES AND MEDICATIONS: updated and reviewed.  Review of patient's allergies indicates:   Allergen Reactions    Dilaudid [hydromorphone] Nausea And Vomiting    Morphine Nausea And Vomiting     Current Outpatient Medications   Medication Sig Dispense Refill    albuterol (VENTOLIN HFA) 90 mcg/actuation inhaler Inhale 2 puffs into the lungs every 4 (four) hours as needed for Wheezing or Shortness of Breath. Rescue 18 g 5    b complex vitamins (B-COMPLEX) tablet Take 1 tablet by mouth once daily.      BREO ELLIPTA 100-25 mcg/dose diskus inhaler INHALE 1 PUFF INTO THE LUNGS EVERY DAY 60 each 5    BREO ELLIPTA 200-25 mcg/dose DsDv diskus inhaler INHALE 1 PUFF BY MOUTH EVERY DAY 1 each 11    calcium carbonate-vit D3-min 600 mg calcium- 400 unit Tab Take 1 tablet by mouth 2 (two) times daily. 180 tablet 3    cetirizine 10 mg chewable tablet Take 10 mg by mouth once daily.      clorazepate  "(TRANXENE) 7.5 MG Tab TAKE 1 TABLET(7.5 MG) BY MOUTH TWICE DAILY 60 tablet 5    escitalopram oxalate (LEXAPRO) 10 MG tablet TAKE 1 TABLET(10 MG) BY MOUTH EVERY DAY 90 tablet 3    esomeprazole (NEXIUM) 20 MG capsule Take 20 mg by mouth before breakfast.      estradiol (ESTRACE) 0.01 % (0.1 mg/gram) vaginal cream INSERT 1/2 GRAM VAGINALLY AT NIGHT FOR 2 WEEKS THEN TWICE A WEEK 42.5 g 5    fluticasone (FLONASE) 50 mcg/actuation nasal spray 2 sprays (100 mcg total) by Each Nare route once daily. 1 Bottle 0    MULTIVITS-MIN/IRON/FA/LUTEIN (ULTIMATE WOMEN'S COMPLETE 50+ ORAL) Take by mouth.      ondansetron (ZOFRAN) 4 MG tablet Take 1 tablet (4 mg total) by mouth every 8 (eight) hours as needed for Nausea. 12 tablet 0    predniSONE (DELTASONE) 20 MG tablet Take 1 tablet (20 mg total) by mouth 2 (two) times daily for 3 days, THEN 1 tablet (20 mg total) once daily for 4 days. 10 tablet 0    promethazine-dextromethorphan (PROMETHAZINE-DM) 6.25-15 mg/5 mL Syrp Take 5 mLs by mouth every 4 (four) hours as needed. 118 mL 0     Current Facility-Administered Medications   Medication Dose Route Frequency Provider Last Rate Last Dose    triamcinolone acetonide injection 40 mg  40 mg Intramuscular 1 time in Clinic/HOD Luma Dalton NP             ROS  Review of Systems   Constitutional: Negative for chills, fatigue and fever.   HENT: Negative for ear pain, postnasal drip and sinus pain.    Respiratory: Positive for cough, chest tightness and wheezing. Negative for shortness of breath.    Cardiovascular: Negative for chest pain.   Gastrointestinal: Negative for diarrhea, nausea and vomiting.           PHYSICAL EXAM  Vitals:    03/05/20 1426   BP: 124/72   BP Location: Right arm   Patient Position: Sitting   BP Method: Medium (Manual)   Pulse: 71   Resp: 18   Temp: 98.6 °F (37 °C)   TempSrc: Oral   SpO2: 96%   Weight: 69.5 kg (153 lb 3.5 oz)   Height: 5' 2" (1.575 m)    Body mass index is 28.02 kg/m².  Weight: 69.5 kg (153 " "lb 3.5 oz)   Height: 5' 2" (157.5 cm)     Physical Exam   Constitutional: She is oriented to person, place, and time. She appears well-developed and well-nourished.   HENT:   Head: Normocephalic.   Right Ear: Tympanic membrane normal.   Left Ear: Tympanic membrane normal.   Mouth/Throat: Uvula is midline, oropharynx is clear and moist and mucous membranes are normal.   Eyes: Conjunctivae are normal.   Cardiovascular: Normal rate, regular rhythm, normal heart sounds and normal pulses.   No murmur heard.  Pulses:       Radial pulses are 2+ on the right side, and 2+ on the left side.   No LE swelling noted   Pulmonary/Chest: Effort normal. She has decreased breath sounds. She has wheezes.   Abdominal: Soft. Bowel sounds are normal. There is no tenderness.   Musculoskeletal: She exhibits no edema.   Lymphadenopathy:     She has no cervical adenopathy.   Neurological: She is alert and oriented to person, place, and time.   Skin: Skin is warm and dry. No rash noted.   Psychiatric: She has a normal mood and affect.         Health Maintenance       Date Due Completion Date    Shingles Vaccine (2 of 2) 01/06/2020 11/11/2019    Mammogram 07/27/2020 7/27/2018    Pneumococcal Vaccine (65+ Low/Medium Risk) (2 of 2 - PPSV23) 10/21/2020 10/21/2019    DEXA SCAN 09/30/2022 9/30/2019    Lipid Panel 10/21/2024 10/21/2019    Colonoscopy 09/17/2025 9/17/2015 (Done)    Override on 9/17/2015: Done    TETANUS VACCINE 10/21/2029 10/21/2019            Assessment & Plan    Problem List Items Addressed This Visit        Unprioritized    COPD (chronic obstructive pulmonary disease)  Continue on breo and albuterol as needed for rescue.       Other Visit Diagnoses     Bronchitis    -  Primary    Relevant Medications    triamcinolone acetonide injection 40 mg (Start on 3/5/2020  3:00 PM)    predniSONE (DELTASONE) 20 MG tablet  Recently completed 7 d course of amoxicillin. Will consider abx for return of fever, change in or persistent s/s.     "       Follow-up: Follow up if symptoms worsen or fail to improve.    Luma Dalton    Medication List with Changes/Refills   New Medications    PREDNISONE (DELTASONE) 20 MG TABLET    Take 1 tablet (20 mg total) by mouth 2 (two) times daily for 3 days, THEN 1 tablet (20 mg total) once daily for 4 days.    PROMETHAZINE-DEXTROMETHORPHAN (PROMETHAZINE-DM) 6.25-15 MG/5 ML SYRP    Take 5 mLs by mouth every 4 (four) hours as needed.   Current Medications    ALBUTEROL (VENTOLIN HFA) 90 MCG/ACTUATION INHALER    Inhale 2 puffs into the lungs every 4 (four) hours as needed for Wheezing or Shortness of Breath. Rescue    B COMPLEX VITAMINS (B-COMPLEX) TABLET    Take 1 tablet by mouth once daily.    BREO ELLIPTA 100-25 MCG/DOSE DISKUS INHALER    INHALE 1 PUFF INTO THE LUNGS EVERY DAY    BREO ELLIPTA 200-25 MCG/DOSE DSDV DISKUS INHALER    INHALE 1 PUFF BY MOUTH EVERY DAY    CALCIUM CARBONATE-VIT D3- MG CALCIUM- 400 UNIT TAB    Take 1 tablet by mouth 2 (two) times daily.    CETIRIZINE 10 MG CHEWABLE TABLET    Take 10 mg by mouth once daily.    CLORAZEPATE (TRANXENE) 7.5 MG TAB    TAKE 1 TABLET(7.5 MG) BY MOUTH TWICE DAILY    ESCITALOPRAM OXALATE (LEXAPRO) 10 MG TABLET    TAKE 1 TABLET(10 MG) BY MOUTH EVERY DAY    ESOMEPRAZOLE (NEXIUM) 20 MG CAPSULE    Take 20 mg by mouth before breakfast.    ESTRADIOL (ESTRACE) 0.01 % (0.1 MG/GRAM) VAGINAL CREAM    INSERT 1/2 GRAM VAGINALLY AT NIGHT FOR 2 WEEKS THEN TWICE A WEEK    FLUTICASONE (FLONASE) 50 MCG/ACTUATION NASAL SPRAY    2 sprays (100 mcg total) by Each Nare route once daily.    MULTIVITS-MIN/IRON/FA/LUTEIN (ULTIMATE WOMEN'S COMPLETE 50+ ORAL)    Take by mouth.    ONDANSETRON (ZOFRAN) 4 MG TABLET    Take 1 tablet (4 mg total) by mouth every 8 (eight) hours as needed for Nausea.   Discontinued Medications    PRAVASTATIN (PRAVACHOL) 10 MG TABLET    Take 1 tablet (10 mg total) by mouth once daily.

## 2020-03-17 DIAGNOSIS — J44.1 COPD WITH ACUTE EXACERBATION: ICD-10-CM

## 2020-03-17 RX ORDER — ALBUTEROL SULFATE 90 UG/1
AEROSOL, METERED RESPIRATORY (INHALATION)
Qty: 18 G | Refills: 5 | Status: SHIPPED | OUTPATIENT
Start: 2020-03-17 | End: 2021-01-21 | Stop reason: SDUPTHER

## 2020-05-27 DIAGNOSIS — E78.5 HYPERLIPIDEMIA, UNSPECIFIED HYPERLIPIDEMIA TYPE: ICD-10-CM

## 2020-05-27 RX ORDER — PRAVASTATIN SODIUM 10 MG/1
TABLET ORAL
Qty: 90 TABLET | Refills: 1 | OUTPATIENT
Start: 2020-05-27

## 2020-05-28 ENCOUNTER — HOSPITAL ENCOUNTER (OUTPATIENT)
Dept: RADIOLOGY | Facility: HOSPITAL | Age: 70
Discharge: HOME | End: 2020-05-28
Attending: ORTHOPAEDIC SURGERY
Payer: MEDICARE

## 2020-05-28 ENCOUNTER — OFFICE VISIT (OUTPATIENT)
Dept: SPORTS MEDICINE | Facility: CLINIC | Age: 70
End: 2020-05-28
Payer: MEDICARE

## 2020-05-28 VITALS
BODY MASS INDEX: 28.16 KG/M2 | SYSTOLIC BLOOD PRESSURE: 121 MMHG | DIASTOLIC BLOOD PRESSURE: 80 MMHG | WEIGHT: 153 LBS | HEART RATE: 78 BPM | HEIGHT: 62 IN

## 2020-05-28 DIAGNOSIS — G89.29 CHRONIC RIGHT SHOULDER PAIN: ICD-10-CM

## 2020-05-28 DIAGNOSIS — M25.511 CHRONIC RIGHT SHOULDER PAIN: ICD-10-CM

## 2020-05-28 DIAGNOSIS — M75.81 ROTATOR CUFF TENDINITIS, RIGHT: Primary | ICD-10-CM

## 2020-05-28 DIAGNOSIS — M25.511 RIGHT SHOULDER PAIN, UNSPECIFIED CHRONICITY: ICD-10-CM

## 2020-05-28 DIAGNOSIS — M75.20 BICEPS TENDINITIS, UNSPECIFIED LATERALITY: ICD-10-CM

## 2020-05-28 DIAGNOSIS — M75.41 IMPINGEMENT SYNDROME OF RIGHT SHOULDER: ICD-10-CM

## 2020-05-28 PROCEDURE — 73030 X-RAY EXAM OF SHOULDER: CPT | Mod: TC,RT

## 2020-05-28 PROCEDURE — 3288F FALL RISK ASSESSMENT DOCD: CPT | Mod: CPTII,S$GLB,, | Performed by: ORTHOPAEDIC SURGERY

## 2020-05-28 PROCEDURE — 1100F PR PT FALLS ASSESS DOC 2+ FALLS/FALL W/INJURY/YR: ICD-10-PCS | Mod: CPTII,S$GLB,, | Performed by: ORTHOPAEDIC SURGERY

## 2020-05-28 PROCEDURE — 99214 PR OFFICE/OUTPT VISIT, EST, LEVL IV, 30-39 MIN: ICD-10-PCS | Mod: S$GLB,,, | Performed by: ORTHOPAEDIC SURGERY

## 2020-05-28 PROCEDURE — 73030 X-RAY EXAM OF SHOULDER: CPT | Mod: 26,RT,, | Performed by: RADIOLOGY

## 2020-05-28 PROCEDURE — 1159F MED LIST DOCD IN RCRD: CPT | Mod: S$GLB,,, | Performed by: ORTHOPAEDIC SURGERY

## 2020-05-28 PROCEDURE — 99999 PR PBB SHADOW E&M-EST. PATIENT-LVL III: CPT | Mod: PBBFAC,,, | Performed by: ORTHOPAEDIC SURGERY

## 2020-05-28 PROCEDURE — 1100F PTFALLS ASSESS-DOCD GE2>/YR: CPT | Mod: CPTII,S$GLB,, | Performed by: ORTHOPAEDIC SURGERY

## 2020-05-28 PROCEDURE — 1159F PR MEDICATION LIST DOCUMENTED IN MEDICAL RECORD: ICD-10-PCS | Mod: S$GLB,,, | Performed by: ORTHOPAEDIC SURGERY

## 2020-05-28 PROCEDURE — 99214 OFFICE O/P EST MOD 30 MIN: CPT | Mod: S$GLB,,, | Performed by: ORTHOPAEDIC SURGERY

## 2020-05-28 PROCEDURE — 1125F PR PAIN SEVERITY QUANTIFIED, PAIN PRESENT: ICD-10-PCS | Mod: S$GLB,,, | Performed by: ORTHOPAEDIC SURGERY

## 2020-05-28 PROCEDURE — 73030 XR SHOULDER COMPLETE 2 OR MORE VIEWS RIGHT: ICD-10-PCS | Mod: 26,RT,, | Performed by: RADIOLOGY

## 2020-05-28 PROCEDURE — 1125F AMNT PAIN NOTED PAIN PRSNT: CPT | Mod: S$GLB,,, | Performed by: ORTHOPAEDIC SURGERY

## 2020-05-28 PROCEDURE — 3288F PR FALLS RISK ASSESSMENT DOCUMENTED: ICD-10-PCS | Mod: CPTII,S$GLB,, | Performed by: ORTHOPAEDIC SURGERY

## 2020-05-28 PROCEDURE — 99999 PR PBB SHADOW E&M-EST. PATIENT-LVL III: ICD-10-PCS | Mod: PBBFAC,,, | Performed by: ORTHOPAEDIC SURGERY

## 2020-05-28 RX ORDER — CELECOXIB 200 MG/1
CAPSULE ORAL
Qty: 180 CAPSULE | Refills: 2 | Status: SHIPPED | OUTPATIENT
Start: 2020-05-28 | End: 2020-06-29

## 2020-05-28 RX ORDER — CELECOXIB 200 MG/1
200 CAPSULE ORAL 2 TIMES DAILY
Qty: 60 CAPSULE | Refills: 1 | Status: SHIPPED | OUTPATIENT
Start: 2020-05-28 | End: 2020-05-28

## 2020-05-28 NOTE — PROGRESS NOTES
CC: Right shoulder pain     69 y.o. Female returns to see me with a new complaint of right shoulder pain x 3 months.. She is retired. No history of specific antecedent trauma. Onset insidiously after an exercise class, upper body resistance training using 5lb weights. She had equal bilateral muscle soreness for a few days, however soreness and pain in right shoulder persisted. This has become a quality of life issue for her. Pain localizes diffusely over the deltoid musculature. Worse with reaching overhead, across her body. Pain is disruptive to sleep at night. Better with rest. Treatment thus far has included activity modifications, rest, and oral medication.  Here today to discuss diagnosis and treatment options.     I saw her previously for her left shoulder.  Greater tuberosity fracture, stable.  She has recovered from that and is doing well.  Right-hand-dominant    Denies neck pain or radicular symptoms.     PMHx notable for COPD  Negative for tobacco.   Negative for diabetes.    PAST MEDICAL HISTORY:   Past Medical History:   Diagnosis Date    Acid reflux     Asthma     Chronic lung disease     COPD (chronic obstructive pulmonary disease)     Depression     Gastric ulcer     Hyperlipidemia     Paralysis of diaphragm     left    PONV (postoperative nausea and vomiting)      PAST SURGICAL HISTORY:  Past Surgical History:   Procedure Laterality Date    APPENDECTOMY      BREAST SURGERY      AUGMENTATION     SECTION      COSMETIC SURGERY      FACE LIFT breast reduction    HYSTEROSCOPY  2017     FAMILY HISTORY:  Family History   Problem Relation Age of Onset    Cancer Mother     Heart disease Father     Colon cancer Sister     Cervical cancer Neg Hx     Endometrial cancer Neg Hx     Vaginal cancer Neg Hx     Breast cancer Neg Hx     Ovarian cancer Neg Hx      MEDICATIONS:    Current Outpatient Medications:     albuterol (PROVENTIL/VENTOLIN HFA) 90 mcg/actuation inhaler, INHALE 2 PUFFS  BY MOUTH EVERY 4 HOURS AS NEEDED FOR WHEEZING OR SHORTNESS OF BREATH, Disp: 18 g, Rfl: 5    b complex vitamins (B-COMPLEX) tablet, Take 1 tablet by mouth once daily., Disp: , Rfl:     BREO ELLIPTA 100-25 mcg/dose diskus inhaler, INHALE 1 PUFF INTO THE LUNGS EVERY DAY, Disp: 60 each, Rfl: 5    BREO ELLIPTA 200-25 mcg/dose DsDv diskus inhaler, INHALE 1 PUFF BY MOUTH EVERY DAY, Disp: 1 each, Rfl: 11    calcium carbonate-vit D3-min 600 mg calcium- 400 unit Tab, Take 1 tablet by mouth 2 (two) times daily., Disp: 180 tablet, Rfl: 3    cetirizine 10 mg chewable tablet, Take 10 mg by mouth once daily., Disp: , Rfl:     clorazepate (TRANXENE) 7.5 MG Tab, TAKE 1 TABLET(7.5 MG) BY MOUTH TWICE DAILY, Disp: 60 tablet, Rfl: 5    escitalopram oxalate (LEXAPRO) 10 MG tablet, TAKE 1 TABLET(10 MG) BY MOUTH EVERY DAY, Disp: 90 tablet, Rfl: 3    esomeprazole (NEXIUM) 20 MG capsule, Take 20 mg by mouth before breakfast., Disp: , Rfl:     estradiol (ESTRACE) 0.01 % (0.1 mg/gram) vaginal cream, INSERT 1/2 GRAM VAGINALLY AT NIGHT FOR 2 WEEKS THEN TWICE A WEEK, Disp: 42.5 g, Rfl: 5    fluticasone (FLONASE) 50 mcg/actuation nasal spray, 2 sprays (100 mcg total) by Each Nare route once daily., Disp: 1 Bottle, Rfl: 0    MULTIVITS-MIN/IRON/FA/LUTEIN (ULTIMATE WOMEN'S COMPLETE 50+ ORAL), Take by mouth., Disp: , Rfl:     ondansetron (ZOFRAN) 4 MG tablet, Take 1 tablet (4 mg total) by mouth every 8 (eight) hours as needed for Nausea., Disp: 12 tablet, Rfl: 0    celecoxib (CELEBREX) 200 MG capsule, TAKE 1 CAPSULE(200 MG) BY MOUTH TWICE DAILY, Disp: 180 capsule, Rfl: 2    ALLERGIES:  Review of patient's allergies indicates:   Allergen Reactions    Dilaudid [hydromorphone] Nausea And Vomiting    Morphine Nausea And Vomiting     REVIEW OF SYSTEMS:  Constitution: Negative. Negative for chills, fever and night sweats.    Hematologic/Lymphatic: Negative for bleeding problem. Does not bruise/bleed easily.   Skin: Negative for dry skin,  "itching and rash.   Musculoskeletal: Negative for falls. Positive for right shoulder pain and muscle weakness.     All other review of symptoms were reviewed and found to be noncontributory.     PHYSICAL EXAMINATION:  Vitals:  /80   Pulse 78   Ht 5' 2" (1.575 m)   Wt 69.4 kg (153 lb)   BMI 27.98 kg/m²    General: Well-developed well-nourished 69 y.o. femalein no acute distress   Cardiovascular: Regular rhythm by palpation of distal pulse, normal color and temperature, no concerning varicosities on symptomatic side   Lungs: No labored breathing or wheezing appreciated   Neuro: Alert and oriented ×3   Psychiatric: well oriented to person, place and time, demonstrates normal mood and affect   Skin: No rashes, lesions or ulcers, normal temperature, turgor, and texture on uninvolved extremity    Ortho/SPM Exam  Examination of the right shoulder demonstrates active forward elevation to 170, ER with arm at side to 60, IR to L1. Passive FE to 180, ER to 70. Prominent tenderness along the proximal biceps tendon.  Positive biceps tension signs.  Negative AC tenderness. 4+/5 resisted supraspinatus testing with limitation due to pain. 5-/5 resisted infraspinatus testing. Negative belly press test. Stable shoulder.  Positive external impingement signs.    No midline neck tenderness. Negative Spurling's maneuver.     IMAGING:  Xrays including AP, Outlet and Axillary Lateral of RIGHT shoulder are ordered / images reviewed by me:   Negative    ASSESSMENT:      ICD-10-CM ICD-9-CM   1. Rotator cuff tendinitis, right M75.81 726.10   2. Biceps tendinitis, unspecified laterality M75.20 726.12   3. Impingement syndrome of right shoulder M75.41 726.2   4. Chronic right shoulder pain M25.511 719.41    G89.29 338.29       PLAN:     Cuff strength is grossly intact on exam.  Some limitation due to pain.  Presentation of insidious onset shoulder pain related to exercise activity.  Initial course of non operative treatment is " recommended.  Hold off on subacromial steroid injection for now.  -Celebrex 200 sent to pharmacy  -PT Referral placed for [facility].  Focus on cuff strengthening and scapular stabilization exercises.  -RTC PRN.  If pain is persistent or recurrent to any significant degree, next step would be MRI.  -All questions answered    Procedures

## 2020-06-02 ENCOUNTER — CLINICAL SUPPORT (OUTPATIENT)
Dept: REHABILITATION | Facility: HOSPITAL | Age: 70
End: 2020-06-02
Attending: ORTHOPAEDIC SURGERY
Payer: MEDICARE

## 2020-06-02 DIAGNOSIS — G89.29 CHRONIC RIGHT SHOULDER PAIN: ICD-10-CM

## 2020-06-02 DIAGNOSIS — M25.511 CHRONIC RIGHT SHOULDER PAIN: ICD-10-CM

## 2020-06-02 DIAGNOSIS — R29.898 SHOULDER WEAKNESS: ICD-10-CM

## 2020-06-02 PROCEDURE — 97110 THERAPEUTIC EXERCISES: CPT

## 2020-06-02 PROCEDURE — 97162 PT EVAL MOD COMPLEX 30 MIN: CPT

## 2020-06-02 NOTE — PLAN OF CARE
[facility]  Physical Therapy Initial Evaluation    Date: 2020   Name: Concha Hess  Clinic Number: [id number]    Therapy Diagnosis:   Encounter Diagnoses   Name Primary?    Chronic right shoulder pain     Shoulder weakness      Physician: MAGGIE López MD    Physician Orders: PT Eval and Treat   Medical Diagnosis from Referral: M25.511,G89.29 (ICD-10-CM) - Chronic right shoulder pain  Evaluation Date: 2020  Authorization Period Expiration: 2020  Plan of Care Expiration: 2020  Visit # / Visits authorized:     Time In: 3:00 pm  Time Out: 3:45 pm  Total Appointment Time (timed & untimed codes): 45 minutes    Precautions: Standard    Subjective   Date of onset: March  History of current condition - Geraldine reports: she has some swelling that started Friday. Her pain overall started in March but couldn't see a doctor right away, and the pain has stayed about the same. She was at pilates when her pain started. She was doing her regular exercises with light weights, and her arms were sore the next day. She hadn't been doing free weights in a long time prior to that. Her R arm stayed sore. Last summer, she fell and broke her other shoulder and that healed well. No N/T or neck pain.      Medical History:   Past Medical History:   Diagnosis Date    Acid reflux     Asthma     Chronic lung disease     COPD (chronic obstructive pulmonary disease)     Depression     Gastric ulcer     Hyperlipidemia     Paralysis of diaphragm     left    PONV (postoperative nausea and vomiting)        Surgical History:   Concha Hess  has a past surgical history that includes  section; Breast surgery; Appendectomy; Cosmetic surgery; and Hysteroscopy (2017).    Medications:   Concha has a current medication list which includes the following prescription(s): albuterol, b complex vitamins, breo ellipta, breo ellipta, calcium carbonate-vit d3-min, celecoxib,  cetirizine, clorazepate, escitalopram oxalate, esomeprazole, estradiol, fluticasone propionate, multivit-min/iron/folic/lutein, and ondansetron.    Allergies:   Review of patient's allergies indicates:   Allergen Reactions    Dilaudid [hydromorphone] Nausea And Vomiting    Morphine Nausea And Vomiting        Imaging, x-ray: Normal right shoulder exam.    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  Prior Level of Function: active with pilates  Current Level of Function: unable to exercise since March; has to modify activities    Pain:  Current 3/10, worst 4/10, best 0/10   Location: right lateral shoulder  Description: Aching  Aggravating Factors: Lifting  Easing Factors: pain medication    Pts goals: get back to Pilates    Objective     Observation: no acute distress    Posture: R scapular depression      Passive Range of Motion:   Shoulder Left Right   Flexion 180 180   ER at 0 80 80   ER at 90 90 90   IR at 90 70 50      Active Range of Motion:   Shoulder Left Right   Flexion 170 160   Abduction 170 150     Upper Extremity Strength   (L) UE (R) UE   Shoulder flexion: 5/5 4+/5   Shoulder Abduction: 5/5 4/5*   Shoulder ER at 90 4/5 4-/5   Shoulder IR at 90 4/5 4-/5   Lower Trap 4/5 3/5   Middle Trap 4/5 3/5   Rhomboids 5/5 5/5       Special Tests:  AC Joint Left Right   AC Joint Compression Test - -   Empty Can Test - +   Drop Arm test - -   Subscaputlaris Lift Off - -   Hawkin's Kenndy - +   Neer's Test - +     Joint Mobility: decreased posterior glide, inferior glide on R    Palpation: no tenderness to palpation, mild increased tone at R upper trap    Sensation: non sensation deficits    Scapular Control/Dyskinesis:    Normal / Subtle / Obvious  Comments    Left  subtle Decreased upward rotation    Right  subtle Decreased upward rotation, mild scapular elevation       Limitation/Restriction for FOTO Shoulder  Survey    Therapist reviewed FOTO scores for Concha Hess on 6/2/2020.   FOTO documents entered into [facility] - see Media section.    Limitation Score: na%         TREATMENT   Treatment Time In: 3:30 pm  Treatment Time Out: 3:45 pm  Total Treatment time (time-based codes) separate from Evaluation: 15 minutes    Geraldine received therapeutic exercises to develop strength and endurance for 15 minutes including:  Prone IR x10  Prone Ts x10  Yellow theraband IR/ER walkouts x10 each    Geraldine received the following manual therapy techniques: Joint mobilizations were applied to the: R shoulder for 5 minutes, including:  Posterior, inferior GH glides, grade III    Home Exercises and Patient Education Provided    Education provided:   - pathophysiology, activity modification, exercise prescription    Written Home Exercises Provided: yes.  Exercises were reviewed and Geraldine was able to demonstrate them prior to the end of the session.  Geraldine demonstrated good  understanding of the education provided.     See EMR under Patient Instructions for exercises provided 6/2/2020.    Assessment   Concha is a 69 y.o. female referred to outpatient Physical Therapy with a medical diagnosis of chronic right shoulder pain. Pt presents with decreased shoulder range of motion, decreased shoulder strength, decreased scapular strength and mobility, and decreased tolerance for functional mobility. She would benefit from skilled PT services to improve function and safely return to her prior level of activity.     Pt prognosis is Good.   Pt will benefit from skilled outpatient Physical Therapy to address the deficits stated above and in the chart below, provide pt/family education, and to maximize pt's level of independence.     Plan of care discussed with patient: Yes  Pt's spiritual, cultural and educational needs considered and patient is agreeable to the plan of care and goals as stated below:     Anticipated Barriers for therapy:  COVID    Medical Necessity is demonstrated by the following  History  Co-morbidities and personal factors that may impact the plan of care Co-morbidities:   concussion, DDD, anxiety, depression, COPD, hyperlipidemia    Personal Factors:   no deficits     moderate   Examination  Body Structures and Functions, activity limitations and participation restrictions that may impact the plan of care Body Regions:   upper extremities    Body Systems:    gross symmetry  ROM  strength  gross coordinated movement  transfers  transitions  motor control  motor learning    Participation Restrictions:   Unable to participate in pilates classes    Activity limitations:   Learning and applying knowledge  no deficits    General Tasks and Commands  no deficits    Communication  no deficits    Mobility  lifting and carrying objects  fine hand use (grasping/picking up)    Self care  washing oneself (bathing, drying, washing hands)  dressing    Domestic Life  shopping  cooking  doing house work (cleaning house, washing dishes, laundry)    Interactions/Relationships  family relationships    Life Areas  no deficits    Community and Social Life  community life  recreation and leisure         moderate   Clinical Presentation evolving clinical presentation with changing clinical characteristics moderate   Decision Making/ Complexity Score: moderate     Goals:  Short Term Goals: 6 weeks   - increase IR ROM by 10 degrees to reach with less pain with decreased anterior shear  - have less pain with bed mobility and complete in usual time  - will demonstrate improved scapular upward rotation with lifting overhead    Long Term Goals: 12 weeks   - pt will demonstrate improved GH and scapular strength by 1 point in applicable muscle groups  - pt will be able to perform pilates class with no reported pain  - pt will demonstrate painfree ADLs and ability to care for grandchildren     Plan   Plan of care Certification: 6/2/2020 to  8/31/2020.    Outpatient Physical Therapy 2 times weekly for 12 weeks to include the following interventions: Manual Therapy, Moist Heat/ Ice, Neuromuscular Re-ed, Patient Education, Therapeutic Activites and Therapeutic Exercise.     Laly Ga, PT

## 2020-07-08 ENCOUNTER — HOSPITAL ENCOUNTER (OUTPATIENT)
Dept: RADIOLOGY | Facility: HOSPITAL | Age: 70
Discharge: HOME | End: 2020-07-08
Attending: FAMILY MEDICINE
Payer: MEDICARE

## 2020-07-08 ENCOUNTER — OFFICE VISIT (OUTPATIENT)
Dept: PULMONOLOGY | Facility: CLINIC | Age: 70
End: 2020-07-08
Payer: MEDICARE

## 2020-07-08 VITALS
WEIGHT: 152.56 LBS | OXYGEN SATURATION: 97 % | BODY MASS INDEX: 28.07 KG/M2 | SYSTOLIC BLOOD PRESSURE: 132 MMHG | DIASTOLIC BLOOD PRESSURE: 76 MMHG | HEIGHT: 62 IN | HEART RATE: 75 BPM

## 2020-07-08 DIAGNOSIS — R91.1 LUNG NODULE: ICD-10-CM

## 2020-07-08 DIAGNOSIS — R91.1 PULMONARY NODULE, RIGHT: ICD-10-CM

## 2020-07-08 DIAGNOSIS — R91.8 ABNORMAL CT SCAN, LUNG: Primary | ICD-10-CM

## 2020-07-08 PROCEDURE — 1101F PT FALLS ASSESS-DOCD LE1/YR: CPT | Mod: HCNC,CPTII,S$GLB, | Performed by: INTERNAL MEDICINE

## 2020-07-08 PROCEDURE — 1126F PR PAIN SEVERITY QUANTIFIED, NO PAIN PRESENT: ICD-10-PCS | Mod: HCNC,S$GLB,, | Performed by: INTERNAL MEDICINE

## 2020-07-08 PROCEDURE — 99213 OFFICE O/P EST LOW 20 MIN: CPT | Mod: HCNC,S$GLB,, | Performed by: INTERNAL MEDICINE

## 2020-07-08 PROCEDURE — 1159F MED LIST DOCD IN RCRD: CPT | Mod: HCNC,S$GLB,, | Performed by: INTERNAL MEDICINE

## 2020-07-08 PROCEDURE — 3008F BODY MASS INDEX DOCD: CPT | Mod: HCNC,CPTII,S$GLB, | Performed by: INTERNAL MEDICINE

## 2020-07-08 PROCEDURE — 1126F AMNT PAIN NOTED NONE PRSNT: CPT | Mod: HCNC,S$GLB,, | Performed by: INTERNAL MEDICINE

## 2020-07-08 PROCEDURE — 71250 CT THORAX DX C-: CPT | Mod: TC,HCNC

## 2020-07-08 PROCEDURE — 3008F PR BODY MASS INDEX (BMI) DOCUMENTED: ICD-10-PCS | Mod: HCNC,CPTII,S$GLB, | Performed by: INTERNAL MEDICINE

## 2020-07-08 PROCEDURE — 71250 CT CHEST WITHOUT CONTRAST: ICD-10-PCS | Mod: 26,HCNC,, | Performed by: RADIOLOGY

## 2020-07-08 PROCEDURE — 99999 PR PBB SHADOW E&M-EST. PATIENT-LVL IV: CPT | Mod: PBBFAC,HCNC,, | Performed by: INTERNAL MEDICINE

## 2020-07-08 PROCEDURE — 99213 PR OFFICE/OUTPT VISIT, EST, LEVL III, 20-29 MIN: ICD-10-PCS | Mod: HCNC,S$GLB,, | Performed by: INTERNAL MEDICINE

## 2020-07-08 PROCEDURE — 99999 PR PBB SHADOW E&M-EST. PATIENT-LVL IV: ICD-10-PCS | Mod: PBBFAC,HCNC,, | Performed by: INTERNAL MEDICINE

## 2020-07-08 PROCEDURE — 99499 UNLISTED E&M SERVICE: CPT | Mod: S$GLB,,, | Performed by: INTERNAL MEDICINE

## 2020-07-08 PROCEDURE — 71250 CT THORAX DX C-: CPT | Mod: 26,HCNC,, | Performed by: RADIOLOGY

## 2020-07-08 PROCEDURE — 1159F PR MEDICATION LIST DOCUMENTED IN MEDICAL RECORD: ICD-10-PCS | Mod: HCNC,S$GLB,, | Performed by: INTERNAL MEDICINE

## 2020-07-08 PROCEDURE — 99499 RISK ADDL DX/OHS AUDIT: ICD-10-PCS | Mod: S$GLB,,, | Performed by: INTERNAL MEDICINE

## 2020-07-08 PROCEDURE — 1101F PR PT FALLS ASSESS DOC 0-1 FALLS W/OUT INJ PAST YR: ICD-10-PCS | Mod: HCNC,CPTII,S$GLB, | Performed by: INTERNAL MEDICINE

## 2020-07-13 ENCOUNTER — CLINICAL SUPPORT (OUTPATIENT)
Dept: URGENT CARE | Facility: CLINIC | Age: 70
End: 2020-07-13
Payer: MEDICARE

## 2020-07-13 VITALS — TEMPERATURE: 98 F

## 2020-07-13 DIAGNOSIS — Z03.818 ENCOUNTER FOR OBSERVATION FOR SUSPECTED EXPOSURE TO OTHER BIOLOGICAL AGENTS RULED OUT: ICD-10-CM

## 2020-07-13 PROCEDURE — U0003 INFECTIOUS AGENT DETECTION BY NUCLEIC ACID (DNA OR RNA); SEVERE ACUTE RESPIRATORY SYNDROME CORONAVIRUS 2 (SARS-COV-2) (CORONAVIRUS DISEASE [COVID-19]), AMPLIFIED PROBE TECHNIQUE, MAKING USE OF HIGH THROUGHPUT TECHNOLOGIES AS DESCRIBED BY CMS-2020-01-R: HCPCS | Mod: HCNC

## 2020-07-14 LAB — SARS-COV-2 RNA RESP QL NAA+PROBE: NOT DETECTED

## 2020-08-05 ENCOUNTER — TELEPHONE (OUTPATIENT)
Dept: PRIMARY CARE CLINIC | Facility: CLINIC | Age: 70
End: 2020-08-05

## 2020-09-08 RX ORDER — CLORAZEPATE DIPOTASSIUM 7.5 MG/1
TABLET ORAL
Qty: 60 TABLET | Refills: 5 | Status: SHIPPED | OUTPATIENT
Start: 2020-09-08 | End: 2021-01-21 | Stop reason: SDUPTHER

## 2020-09-29 RX ORDER — BENZONATATE 100 MG/1
100 CAPSULE ORAL 3 TIMES DAILY PRN
COMMUNITY
End: 2020-09-29 | Stop reason: SDUPTHER

## 2020-09-29 RX ORDER — BENZONATATE 100 MG/1
100 CAPSULE ORAL 3 TIMES DAILY PRN
Qty: 30 CAPSULE | Refills: 3 | Status: SHIPPED | OUTPATIENT
Start: 2020-09-29 | End: 2021-01-21 | Stop reason: SDUPTHER

## 2020-09-30 ENCOUNTER — PES CALL (OUTPATIENT)
Dept: ADMINISTRATIVE | Facility: CLINIC | Age: 70
End: 2020-09-30

## 2021-01-21 DIAGNOSIS — M85.851 OSTEOPENIA OF BOTH HIPS: ICD-10-CM

## 2021-01-21 DIAGNOSIS — E53.8 B12 DEFICIENCY: ICD-10-CM

## 2021-01-21 DIAGNOSIS — J44.1 COPD WITH ACUTE EXACERBATION: ICD-10-CM

## 2021-01-21 DIAGNOSIS — J44.9 CHRONIC OBSTRUCTIVE PULMONARY DISEASE, UNSPECIFIED COPD TYPE: ICD-10-CM

## 2021-01-21 DIAGNOSIS — M85.852 OSTEOPENIA OF BOTH HIPS: ICD-10-CM

## 2021-01-21 RX ORDER — BENZONATATE 100 MG/1
100 CAPSULE ORAL 3 TIMES DAILY PRN
Qty: 30 CAPSULE | Refills: 1 | Status: SHIPPED | OUTPATIENT
Start: 2021-01-21 | End: 2022-06-02

## 2021-01-21 RX ORDER — LYSINE HCL 500 MG
1 TABLET ORAL 2 TIMES DAILY
Qty: 180 TABLET | Refills: 1 | Status: SHIPPED | OUTPATIENT
Start: 2021-01-21

## 2021-01-21 RX ORDER — MULTIVIT WITH MINERALS/HERBS
1 TABLET ORAL DAILY
Qty: 90 TABLET | Refills: 1 | Status: SHIPPED | OUTPATIENT
Start: 2021-01-21

## 2021-01-21 RX ORDER — ALBUTEROL SULFATE 90 UG/1
2 AEROSOL, METERED RESPIRATORY (INHALATION) EVERY 4 HOURS PRN
Qty: 18 G | Refills: 5 | Status: SHIPPED | OUTPATIENT
Start: 2021-01-21 | End: 2021-09-10 | Stop reason: SDUPTHER

## 2021-01-21 RX ORDER — ESCITALOPRAM OXALATE 10 MG/1
10 TABLET ORAL DAILY
Qty: 90 TABLET | Refills: 1 | Status: SHIPPED | OUTPATIENT
Start: 2021-01-21 | End: 2022-01-20 | Stop reason: SDUPTHER

## 2021-01-21 RX ORDER — FLUTICASONE FUROATE AND VILANTEROL TRIFENATATE 100; 25 UG/1; UG/1
1 POWDER RESPIRATORY (INHALATION) DAILY
Qty: 180 EACH | Refills: 3 | Status: SHIPPED | OUTPATIENT
Start: 2021-01-21 | End: 2021-09-10 | Stop reason: SDUPTHER

## 2021-01-21 RX ORDER — CLORAZEPATE DIPOTASSIUM 7.5 MG/1
7.5 TABLET ORAL 2 TIMES DAILY PRN
Qty: 60 TABLET | Refills: 1 | Status: SHIPPED | OUTPATIENT
Start: 2021-01-21 | End: 2021-07-08 | Stop reason: SDUPTHER

## 2021-01-22 ENCOUNTER — IMMUNIZATION (OUTPATIENT)
Dept: PHARMACY | Facility: CLINIC | Age: 71
End: 2021-01-22
Payer: MEDICARE

## 2021-01-22 DIAGNOSIS — Z23 NEED FOR VACCINATION: Primary | ICD-10-CM

## 2021-01-22 RX ORDER — FLUTICASONE FUROATE AND VILANTEROL TRIFENATATE 200; 25 UG/1; UG/1
1 POWDER RESPIRATORY (INHALATION) DAILY
Qty: 1 EACH | Refills: 11 | Status: SHIPPED | OUTPATIENT
Start: 2021-01-22 | End: 2021-09-10

## 2021-02-19 ENCOUNTER — IMMUNIZATION (OUTPATIENT)
Dept: PHARMACY | Facility: CLINIC | Age: 71
End: 2021-02-19
Payer: MEDICARE

## 2021-02-19 DIAGNOSIS — Z23 NEED FOR VACCINATION: Primary | ICD-10-CM

## 2021-03-18 ENCOUNTER — PATIENT MESSAGE (OUTPATIENT)
Dept: RESEARCH | Facility: HOSPITAL | Age: 71
End: 2021-03-18

## 2021-03-26 ENCOUNTER — PATIENT MESSAGE (OUTPATIENT)
Dept: RESEARCH | Facility: HOSPITAL | Age: 71
End: 2021-03-26

## 2021-04-05 ENCOUNTER — PATIENT MESSAGE (OUTPATIENT)
Dept: ADMINISTRATIVE | Facility: HOSPITAL | Age: 71
End: 2021-04-05

## 2021-07-06 ENCOUNTER — PATIENT MESSAGE (OUTPATIENT)
Dept: ADMINISTRATIVE | Facility: HOSPITAL | Age: 71
End: 2021-07-06

## 2021-07-06 RX ORDER — CLORAZEPATE DIPOTASSIUM 7.5 MG/1
7.5 TABLET ORAL 2 TIMES DAILY PRN
Qty: 60 TABLET | Refills: 1 | OUTPATIENT
Start: 2021-07-06

## 2021-07-08 RX ORDER — CLORAZEPATE DIPOTASSIUM 7.5 MG/1
7.5 TABLET ORAL 2 TIMES DAILY PRN
Qty: 60 TABLET | Refills: 0 | Status: SHIPPED | OUTPATIENT
Start: 2021-07-08 | End: 2021-09-10 | Stop reason: SDUPTHER

## 2021-07-22 ENCOUNTER — PES CALL (OUTPATIENT)
Dept: ADMINISTRATIVE | Facility: CLINIC | Age: 71
End: 2021-07-22

## 2021-08-03 ENCOUNTER — TELEPHONE (OUTPATIENT)
Dept: PRIMARY CARE CLINIC | Facility: CLINIC | Age: 71
End: 2021-08-03

## 2021-08-03 DIAGNOSIS — E55.9 VITAMIN D DEFICIENCY: ICD-10-CM

## 2021-08-03 DIAGNOSIS — E78.5 HYPERLIPIDEMIA, UNSPECIFIED HYPERLIPIDEMIA TYPE: Primary | ICD-10-CM

## 2021-08-03 DIAGNOSIS — R79.89 LOW VITAMIN B12 LEVEL: ICD-10-CM

## 2021-08-13 NOTE — TELEPHONE ENCOUNTER
Orders entered.      
Pt need mmg orders would like to have them done on the same day as annual 7-27-18. Please let me know when they are in so I can sched pt. Thanks  
Yes

## 2021-09-10 ENCOUNTER — OFFICE VISIT (OUTPATIENT)
Dept: PRIMARY CARE CLINIC | Facility: CLINIC | Age: 71
End: 2021-09-10
Payer: MEDICARE

## 2021-09-10 VITALS
RESPIRATION RATE: 16 BRPM | SYSTOLIC BLOOD PRESSURE: 102 MMHG | HEIGHT: 62 IN | OXYGEN SATURATION: 95 % | WEIGHT: 148.5 LBS | HEART RATE: 77 BPM | DIASTOLIC BLOOD PRESSURE: 62 MMHG | BODY MASS INDEX: 27.33 KG/M2

## 2021-09-10 DIAGNOSIS — Z12.31 ENCOUNTER FOR SCREENING MAMMOGRAM FOR BREAST CANCER: ICD-10-CM

## 2021-09-10 DIAGNOSIS — J44.1 COPD WITH ACUTE EXACERBATION: Primary | ICD-10-CM

## 2021-09-10 DIAGNOSIS — F41.9 ANXIETY: ICD-10-CM

## 2021-09-10 DIAGNOSIS — Z23 NEED FOR VACCINATION: ICD-10-CM

## 2021-09-10 PROCEDURE — 1159F MED LIST DOCD IN RCRD: CPT | Mod: HCNC,CPTII,S$GLB, | Performed by: FAMILY MEDICINE

## 2021-09-10 PROCEDURE — 99214 OFFICE O/P EST MOD 30 MIN: CPT | Mod: HCNC,S$GLB,, | Performed by: FAMILY MEDICINE

## 2021-09-10 PROCEDURE — 3008F PR BODY MASS INDEX (BMI) DOCUMENTED: ICD-10-PCS | Mod: HCNC,CPTII,S$GLB, | Performed by: FAMILY MEDICINE

## 2021-09-10 PROCEDURE — 3288F PR FALLS RISK ASSESSMENT DOCUMENTED: ICD-10-PCS | Mod: HCNC,CPTII,S$GLB, | Performed by: FAMILY MEDICINE

## 2021-09-10 PROCEDURE — 1160F PR REVIEW ALL MEDS BY PRESCRIBER/CLIN PHARMACIST DOCUMENTED: ICD-10-PCS | Mod: HCNC,CPTII,S$GLB, | Performed by: FAMILY MEDICINE

## 2021-09-10 PROCEDURE — 3074F PR MOST RECENT SYSTOLIC BLOOD PRESSURE < 130 MM HG: ICD-10-PCS | Mod: HCNC,CPTII,S$GLB, | Performed by: FAMILY MEDICINE

## 2021-09-10 PROCEDURE — 99999 PR PBB SHADOW E&M-EST. PATIENT-LVL IV: CPT | Mod: PBBFAC,HCNC,, | Performed by: FAMILY MEDICINE

## 2021-09-10 PROCEDURE — 99214 PR OFFICE/OUTPT VISIT, EST, LEVL IV, 30-39 MIN: ICD-10-PCS | Mod: HCNC,S$GLB,, | Performed by: FAMILY MEDICINE

## 2021-09-10 PROCEDURE — 1159F PR MEDICATION LIST DOCUMENTED IN MEDICAL RECORD: ICD-10-PCS | Mod: HCNC,CPTII,S$GLB, | Performed by: FAMILY MEDICINE

## 2021-09-10 PROCEDURE — 1101F PR PT FALLS ASSESS DOC 0-1 FALLS W/OUT INJ PAST YR: ICD-10-PCS | Mod: HCNC,CPTII,S$GLB, | Performed by: FAMILY MEDICINE

## 2021-09-10 PROCEDURE — 1126F AMNT PAIN NOTED NONE PRSNT: CPT | Mod: HCNC,CPTII,S$GLB, | Performed by: FAMILY MEDICINE

## 2021-09-10 PROCEDURE — 99499 RISK ADDL DX/OHS AUDIT: ICD-10-PCS | Mod: S$GLB,,, | Performed by: FAMILY MEDICINE

## 2021-09-10 PROCEDURE — 3074F SYST BP LT 130 MM HG: CPT | Mod: HCNC,CPTII,S$GLB, | Performed by: FAMILY MEDICINE

## 2021-09-10 PROCEDURE — 3078F DIAST BP <80 MM HG: CPT | Mod: HCNC,CPTII,S$GLB, | Performed by: FAMILY MEDICINE

## 2021-09-10 PROCEDURE — 1101F PT FALLS ASSESS-DOCD LE1/YR: CPT | Mod: HCNC,CPTII,S$GLB, | Performed by: FAMILY MEDICINE

## 2021-09-10 PROCEDURE — 99499 UNLISTED E&M SERVICE: CPT | Mod: S$GLB,,, | Performed by: FAMILY MEDICINE

## 2021-09-10 PROCEDURE — 3288F FALL RISK ASSESSMENT DOCD: CPT | Mod: HCNC,CPTII,S$GLB, | Performed by: FAMILY MEDICINE

## 2021-09-10 PROCEDURE — 99999 PR PBB SHADOW E&M-EST. PATIENT-LVL IV: ICD-10-PCS | Mod: PBBFAC,HCNC,, | Performed by: FAMILY MEDICINE

## 2021-09-10 PROCEDURE — 1160F RVW MEDS BY RX/DR IN RCRD: CPT | Mod: HCNC,CPTII,S$GLB, | Performed by: FAMILY MEDICINE

## 2021-09-10 PROCEDURE — 1126F PR PAIN SEVERITY QUANTIFIED, NO PAIN PRESENT: ICD-10-PCS | Mod: HCNC,CPTII,S$GLB, | Performed by: FAMILY MEDICINE

## 2021-09-10 PROCEDURE — 3008F BODY MASS INDEX DOCD: CPT | Mod: HCNC,CPTII,S$GLB, | Performed by: FAMILY MEDICINE

## 2021-09-10 PROCEDURE — 3078F PR MOST RECENT DIASTOLIC BLOOD PRESSURE < 80 MM HG: ICD-10-PCS | Mod: HCNC,CPTII,S$GLB, | Performed by: FAMILY MEDICINE

## 2021-09-10 RX ORDER — DOXYCYCLINE 100 MG/1
100 CAPSULE ORAL EVERY 12 HOURS
Qty: 14 CAPSULE | Refills: 0 | Status: SHIPPED | OUTPATIENT
Start: 2021-09-10 | End: 2021-09-17

## 2021-09-10 RX ORDER — FLUTICASONE FUROATE AND VILANTEROL TRIFENATATE 100; 25 UG/1; UG/1
1 POWDER RESPIRATORY (INHALATION) DAILY
Qty: 180 EACH | Refills: 3 | Status: SHIPPED | OUTPATIENT
Start: 2021-09-10 | End: 2022-10-04 | Stop reason: SDUPTHER

## 2021-09-10 RX ORDER — CLORAZEPATE DIPOTASSIUM 7.5 MG/1
7.5 TABLET ORAL 2 TIMES DAILY PRN
Qty: 60 TABLET | Refills: 3 | Status: SHIPPED | OUTPATIENT
Start: 2021-09-10 | End: 2022-03-17

## 2021-09-10 RX ORDER — ALBUTEROL SULFATE 90 UG/1
2 AEROSOL, METERED RESPIRATORY (INHALATION) EVERY 4 HOURS PRN
Qty: 18 G | Refills: 5 | Status: SHIPPED | OUTPATIENT
Start: 2021-09-10 | End: 2023-03-13 | Stop reason: SDUPTHER

## 2021-09-10 RX ORDER — PREDNISONE 20 MG/1
TABLET ORAL
Qty: 10 TABLET | Refills: 0 | Status: SHIPPED | OUTPATIENT
Start: 2021-09-10 | End: 2021-09-17

## 2021-09-16 DIAGNOSIS — J44.9 CHRONIC OBSTRUCTIVE PULMONARY DISEASE, UNSPECIFIED COPD TYPE: Primary | ICD-10-CM

## 2021-09-17 DIAGNOSIS — R91.8 ABNORMAL CT SCAN, LUNG: Primary | ICD-10-CM

## 2021-09-17 DIAGNOSIS — R91.1 SOLITARY PULMONARY NODULE: ICD-10-CM

## 2021-10-04 ENCOUNTER — HOSPITAL ENCOUNTER (OUTPATIENT)
Dept: RADIOLOGY | Facility: OTHER | Age: 71
Discharge: HOME | End: 2021-10-04
Attending: FAMILY MEDICINE
Payer: MEDICARE

## 2021-10-04 DIAGNOSIS — Z12.31 ENCOUNTER FOR SCREENING MAMMOGRAM FOR BREAST CANCER: ICD-10-CM

## 2021-10-04 PROCEDURE — 77067 SCR MAMMO BI INCL CAD: CPT | Mod: 26,HCNC,, | Performed by: RADIOLOGY

## 2021-10-04 PROCEDURE — 77067 MAMMO DIGITAL SCREENING BILAT WITH TOMO: ICD-10-PCS | Mod: 26,HCNC,, | Performed by: RADIOLOGY

## 2021-10-04 PROCEDURE — 77067 SCR MAMMO BI INCL CAD: CPT | Mod: TC,HCNC

## 2021-10-04 PROCEDURE — 77063 MAMMO DIGITAL SCREENING BILAT WITH TOMO: ICD-10-PCS | Mod: 26,HCNC,, | Performed by: RADIOLOGY

## 2021-10-04 PROCEDURE — 77063 BREAST TOMOSYNTHESIS BI: CPT | Mod: 26,HCNC,, | Performed by: RADIOLOGY

## 2021-10-05 ENCOUNTER — PATIENT OUTREACH (OUTPATIENT)
Dept: ADMINISTRATIVE | Facility: OTHER | Age: 71
End: 2021-10-05

## 2021-10-06 ENCOUNTER — CLINICAL SUPPORT (OUTPATIENT)
Dept: URGENT CARE | Facility: CLINIC | Age: 71
End: 2021-10-06
Payer: MEDICARE

## 2021-10-06 DIAGNOSIS — Z11.52 ENCOUNTER FOR SCREENING FOR COVID-19: Primary | ICD-10-CM

## 2021-10-06 LAB
CTP QC/QA: YES
SARS-COV-2 RDRP RESP QL NAA+PROBE: NEGATIVE

## 2021-10-06 PROCEDURE — U0002 COVID-19 LAB TEST NON-CDC: HCPCS | Mod: QW,S$GLB,, | Performed by: STUDENT IN AN ORGANIZED HEALTH CARE EDUCATION/TRAINING PROGRAM

## 2021-10-06 PROCEDURE — U0002: ICD-10-PCS | Mod: QW,S$GLB,, | Performed by: STUDENT IN AN ORGANIZED HEALTH CARE EDUCATION/TRAINING PROGRAM

## 2021-10-07 ENCOUNTER — HOSPITAL ENCOUNTER (OUTPATIENT)
Dept: RADIOLOGY | Facility: HOSPITAL | Age: 71
Discharge: HOME | End: 2021-10-07
Attending: INTERNAL MEDICINE
Payer: MEDICARE

## 2021-10-07 ENCOUNTER — OFFICE VISIT (OUTPATIENT)
Dept: PULMONOLOGY | Facility: CLINIC | Age: 71
End: 2021-10-07
Payer: MEDICARE

## 2021-10-07 ENCOUNTER — HOSPITAL ENCOUNTER (OUTPATIENT)
Dept: PULMONOLOGY | Facility: CLINIC | Age: 71
Discharge: HOME | End: 2021-10-07
Payer: MEDICARE

## 2021-10-07 VITALS
WEIGHT: 149.94 LBS | HEIGHT: 62 IN | DIASTOLIC BLOOD PRESSURE: 82 MMHG | HEART RATE: 90 BPM | OXYGEN SATURATION: 94 % | SYSTOLIC BLOOD PRESSURE: 110 MMHG | BODY MASS INDEX: 27.59 KG/M2

## 2021-10-07 DIAGNOSIS — J45.30 MILD PERSISTENT ASTHMA, UNSPECIFIED WHETHER COMPLICATED: ICD-10-CM

## 2021-10-07 DIAGNOSIS — J45.20 MILD INTERMITTENT ASTHMA, UNSPECIFIED WHETHER COMPLICATED: Primary | ICD-10-CM

## 2021-10-07 DIAGNOSIS — J44.9 CHRONIC OBSTRUCTIVE PULMONARY DISEASE, UNSPECIFIED COPD TYPE: ICD-10-CM

## 2021-10-07 DIAGNOSIS — R91.1 SOLITARY PULMONARY NODULE: ICD-10-CM

## 2021-10-07 DIAGNOSIS — R91.8 ABNORMAL CT SCAN, LUNG: ICD-10-CM

## 2021-10-07 LAB
DLCO SINGLE BREATH LLN: 14.31
DLCO SINGLE BREATH PRE REF: 63.1 %
DLCO SINGLE BREATH REF: 20.04
DLCOC SBVA LLN: 2.79
DLCOC SBVA REF: 4.35
DLCOC SINGLE BREATH LLN: 14.31
DLCOC SINGLE BREATH REF: 20.04
DLCOCSBVAULN: 5.91
DLCOCSINGLEBREATHULN: 25.77
DLCOSINGLEBREATHULN: 25.77
DLCOVA LLN: 2.79
DLCOVA PRE REF: 119.4 %
DLCOVA PRE: 5.2 ML/(MIN*MMHG*L) (ref 2.79–5.91)
DLCOVA REF: 4.35
DLCOVAULN: 5.91
FEF 25 75 LLN: 0.81
FEF 25 75 PRE REF: 38.2 %
FEF 25 75 REF: 1.76
FEV05 LLN: 0.75
FEV05 REF: 1.61
FEV1 FVC LLN: 65
FEV1 FVC PRE REF: 90.6 %
FEV1 FVC REF: 78
FEV1 LLN: 1.48
FEV1 PRE REF: 61.3 %
FEV1 REF: 2.04
FVC LLN: 1.91
FVC PRE REF: 67.2 %
FVC REF: 2.63
IVC PRE: 1.61 L (ref 1.91–3.38)
IVC SINGLE BREATH LLN: 1.91
IVC SINGLE BREATH PRE REF: 61.2 %
IVC SINGLE BREATH REF: 2.63
IVCSINGLEBREATHULN: 3.38
PEF LLN: 3.74
PEF PRE REF: 90.2 %
PEF REF: 5.34
PRE DLCO: 12.64 ML/(MIN*MMHG) (ref 14.31–25.77)
PRE FEF 25 75: 0.67 L/S (ref 0.81–3.12)
PRE FET 100: 8.24 SEC
PRE FEV05 REF: 68.1 %
PRE FEV1 FVC: 70.93 % (ref 64.87–89.86)
PRE FEV1: 1.25 L (ref 1.48–2.58)
PRE FEV5: 1.09 L (ref 0.75–2.46)
PRE FVC: 1.76 L (ref 1.91–3.38)
PRE PEF: 4.82 L/S (ref 3.74–6.95)
VA PRE: 2.43 L (ref 4.45–4.45)
VA SINGLE BREATH PRE REF: 54.6 %
VA SINGLE BREATH REF: 4.45

## 2021-10-07 PROCEDURE — 71250 CT THORAX DX C-: CPT | Mod: 26,HCNC,, | Performed by: RADIOLOGY

## 2021-10-07 PROCEDURE — 3008F PR BODY MASS INDEX (BMI) DOCUMENTED: ICD-10-PCS | Mod: HCNC,CPTII,S$GLB, | Performed by: INTERNAL MEDICINE

## 2021-10-07 PROCEDURE — 94010 BREATHING CAPACITY TEST: CPT | Mod: HCNC,S$GLB,, | Performed by: INTERNAL MEDICINE

## 2021-10-07 PROCEDURE — 3288F PR FALLS RISK ASSESSMENT DOCUMENTED: ICD-10-PCS | Mod: HCNC,CPTII,S$GLB, | Performed by: INTERNAL MEDICINE

## 2021-10-07 PROCEDURE — 71250 CT THORAX DX C-: CPT | Mod: TC,HCNC

## 2021-10-07 PROCEDURE — 1160F PR REVIEW ALL MEDS BY PRESCRIBER/CLIN PHARMACIST DOCUMENTED: ICD-10-PCS | Mod: HCNC,CPTII,S$GLB, | Performed by: INTERNAL MEDICINE

## 2021-10-07 PROCEDURE — 3079F DIAST BP 80-89 MM HG: CPT | Mod: HCNC,CPTII,S$GLB, | Performed by: INTERNAL MEDICINE

## 2021-10-07 PROCEDURE — 3008F BODY MASS INDEX DOCD: CPT | Mod: HCNC,CPTII,S$GLB, | Performed by: INTERNAL MEDICINE

## 2021-10-07 PROCEDURE — 1159F PR MEDICATION LIST DOCUMENTED IN MEDICAL RECORD: ICD-10-PCS | Mod: HCNC,CPTII,S$GLB, | Performed by: INTERNAL MEDICINE

## 2021-10-07 PROCEDURE — 99999 PR PBB SHADOW E&M-EST. PATIENT-LVL III: ICD-10-PCS | Mod: PBBFAC,HCNC,, | Performed by: INTERNAL MEDICINE

## 2021-10-07 PROCEDURE — 1126F AMNT PAIN NOTED NONE PRSNT: CPT | Mod: HCNC,CPTII,S$GLB, | Performed by: INTERNAL MEDICINE

## 2021-10-07 PROCEDURE — 99214 OFFICE O/P EST MOD 30 MIN: CPT | Mod: HCNC,S$GLB,, | Performed by: INTERNAL MEDICINE

## 2021-10-07 PROCEDURE — 99499 RISK ADDL DX/OHS AUDIT: ICD-10-PCS | Mod: S$GLB,,, | Performed by: INTERNAL MEDICINE

## 2021-10-07 PROCEDURE — 3079F PR MOST RECENT DIASTOLIC BLOOD PRESSURE 80-89 MM HG: ICD-10-PCS | Mod: HCNC,CPTII,S$GLB, | Performed by: INTERNAL MEDICINE

## 2021-10-07 PROCEDURE — 3074F PR MOST RECENT SYSTOLIC BLOOD PRESSURE < 130 MM HG: ICD-10-PCS | Mod: HCNC,CPTII,S$GLB, | Performed by: INTERNAL MEDICINE

## 2021-10-07 PROCEDURE — 94010 BREATHING CAPACITY TEST: ICD-10-PCS | Mod: HCNC,S$GLB,, | Performed by: INTERNAL MEDICINE

## 2021-10-07 PROCEDURE — 99214 PR OFFICE/OUTPT VISIT, EST, LEVL IV, 30-39 MIN: ICD-10-PCS | Mod: HCNC,S$GLB,, | Performed by: INTERNAL MEDICINE

## 2021-10-07 PROCEDURE — 71250 CT CHEST WITHOUT CONTRAST: ICD-10-PCS | Mod: 26,HCNC,, | Performed by: RADIOLOGY

## 2021-10-07 PROCEDURE — 1101F PR PT FALLS ASSESS DOC 0-1 FALLS W/OUT INJ PAST YR: ICD-10-PCS | Mod: HCNC,CPTII,S$GLB, | Performed by: INTERNAL MEDICINE

## 2021-10-07 PROCEDURE — 94729 DIFFUSING CAPACITY: CPT | Mod: HCNC,S$GLB,, | Performed by: INTERNAL MEDICINE

## 2021-10-07 PROCEDURE — 99999 PR PBB SHADOW E&M-EST. PATIENT-LVL III: CPT | Mod: PBBFAC,HCNC,, | Performed by: INTERNAL MEDICINE

## 2021-10-07 PROCEDURE — 3288F FALL RISK ASSESSMENT DOCD: CPT | Mod: HCNC,CPTII,S$GLB, | Performed by: INTERNAL MEDICINE

## 2021-10-07 PROCEDURE — 1159F MED LIST DOCD IN RCRD: CPT | Mod: HCNC,CPTII,S$GLB, | Performed by: INTERNAL MEDICINE

## 2021-10-07 PROCEDURE — 3074F SYST BP LT 130 MM HG: CPT | Mod: HCNC,CPTII,S$GLB, | Performed by: INTERNAL MEDICINE

## 2021-10-07 PROCEDURE — 1101F PT FALLS ASSESS-DOCD LE1/YR: CPT | Mod: HCNC,CPTII,S$GLB, | Performed by: INTERNAL MEDICINE

## 2021-10-07 PROCEDURE — 1126F PR PAIN SEVERITY QUANTIFIED, NO PAIN PRESENT: ICD-10-PCS | Mod: HCNC,CPTII,S$GLB, | Performed by: INTERNAL MEDICINE

## 2021-10-07 PROCEDURE — 94729 PR C02/MEMBANE DIFFUSE CAPACITY: ICD-10-PCS | Mod: HCNC,S$GLB,, | Performed by: INTERNAL MEDICINE

## 2021-10-07 PROCEDURE — 99499 UNLISTED E&M SERVICE: CPT | Mod: S$GLB,,, | Performed by: INTERNAL MEDICINE

## 2021-10-07 PROCEDURE — 1160F RVW MEDS BY RX/DR IN RCRD: CPT | Mod: HCNC,CPTII,S$GLB, | Performed by: INTERNAL MEDICINE

## 2021-10-08 ENCOUNTER — PATIENT MESSAGE (OUTPATIENT)
Dept: PULMONOLOGY | Facility: CLINIC | Age: 71
End: 2021-10-08

## 2021-10-08 ENCOUNTER — TELEPHONE (OUTPATIENT)
Dept: PULMONOLOGY | Facility: CLINIC | Age: 71
End: 2021-10-08

## 2021-10-08 RX ORDER — PREDNISONE 10 MG/1
TABLET ORAL
Qty: 36 TABLET | Refills: 0 | Status: SHIPPED | OUTPATIENT
Start: 2021-10-08 | End: 2022-06-02

## 2021-11-19 ENCOUNTER — PATIENT OUTREACH (OUTPATIENT)
Dept: ADMINISTRATIVE | Facility: HOSPITAL | Age: 71
End: 2021-11-19
Payer: MEDICARE

## 2022-01-12 RX ORDER — PREDNISONE 20 MG/1
TABLET ORAL
Qty: 10 TABLET | Refills: 0 | Status: SHIPPED | OUTPATIENT
Start: 2022-01-12 | End: 2022-01-19

## 2022-01-12 RX ORDER — DOXYCYCLINE 100 MG/1
100 CAPSULE ORAL EVERY 12 HOURS
Qty: 14 CAPSULE | Refills: 0 | Status: SHIPPED | OUTPATIENT
Start: 2022-01-12 | End: 2022-01-19

## 2022-01-20 ENCOUNTER — PATIENT MESSAGE (OUTPATIENT)
Dept: PRIMARY CARE CLINIC | Facility: CLINIC | Age: 72
End: 2022-01-20
Payer: MEDICARE

## 2022-01-21 RX ORDER — ESCITALOPRAM OXALATE 10 MG/1
10 TABLET ORAL DAILY
Qty: 90 TABLET | Refills: 1 | Status: SHIPPED | OUTPATIENT
Start: 2022-01-21 | End: 2022-02-07 | Stop reason: SDUPTHER

## 2022-02-05 ENCOUNTER — PATIENT MESSAGE (OUTPATIENT)
Dept: PRIMARY CARE CLINIC | Facility: CLINIC | Age: 72
End: 2022-02-05

## 2022-02-05 DIAGNOSIS — F32.A DEPRESSION, UNSPECIFIED DEPRESSION TYPE: Primary | ICD-10-CM

## 2022-02-07 RX ORDER — ESCITALOPRAM OXALATE 10 MG/1
10 TABLET ORAL DAILY
Qty: 90 TABLET | Refills: 1 | Status: SHIPPED | OUTPATIENT
Start: 2022-02-07 | End: 2022-08-04 | Stop reason: SDUPTHER

## 2022-02-07 NOTE — TELEPHONE ENCOUNTER
No new care gaps identified.  Powered by [employer]. Reference number: [id number].   2/07/2022 9:14:48 AM CST

## 2022-03-16 DIAGNOSIS — F41.9 ANXIETY: ICD-10-CM

## 2022-03-16 NOTE — TELEPHONE ENCOUNTER
No new care gaps identified.  Powered by [employer] by [employer]. Reference number: [id number].   3/16/2022 6:19:17 PM CDT

## 2022-03-17 ENCOUNTER — PATIENT MESSAGE (OUTPATIENT)
Dept: PRIMARY CARE CLINIC | Facility: CLINIC | Age: 72
End: 2022-03-17
Payer: MEDICARE

## 2022-03-17 RX ORDER — CLORAZEPATE DIPOTASSIUM 7.5 MG/1
TABLET ORAL
Qty: 60 TABLET | Refills: 3 | Status: SHIPPED | OUTPATIENT
Start: 2022-03-17 | End: 2022-09-22 | Stop reason: SDUPTHER

## 2022-04-28 DIAGNOSIS — J44.9 CHRONIC OBSTRUCTIVE PULMONARY DISEASE, UNSPECIFIED COPD TYPE: Primary | ICD-10-CM

## 2022-05-03 ENCOUNTER — HOSPITAL ENCOUNTER (OUTPATIENT)
Dept: RADIOLOGY | Facility: OTHER | Age: 72
Discharge: HOME | End: 2022-05-03
Attending: INTERNAL MEDICINE
Payer: MEDICARE

## 2022-05-03 DIAGNOSIS — J44.9 CHRONIC OBSTRUCTIVE PULMONARY DISEASE, UNSPECIFIED COPD TYPE: ICD-10-CM

## 2022-05-03 PROCEDURE — 71046 X-RAY EXAM CHEST 2 VIEWS: CPT | Mod: TC,FY

## 2022-05-03 PROCEDURE — 71046 XR CHEST PA AND LATERAL: ICD-10-PCS | Mod: 26,,, | Performed by: RADIOLOGY

## 2022-05-03 PROCEDURE — 71046 X-RAY EXAM CHEST 2 VIEWS: CPT | Mod: 26,,, | Performed by: RADIOLOGY

## 2022-06-02 ENCOUNTER — OFFICE VISIT (OUTPATIENT)
Dept: PRIMARY CARE CLINIC | Facility: CLINIC | Age: 72
End: 2022-06-02
Payer: MEDICARE

## 2022-06-02 ENCOUNTER — LAB VISIT (OUTPATIENT)
Dept: LAB | Facility: HOSPITAL | Age: 72
End: 2022-06-02
Attending: FAMILY MEDICINE
Payer: MEDICARE

## 2022-06-02 VITALS
SYSTOLIC BLOOD PRESSURE: 116 MMHG | BODY MASS INDEX: 27.38 KG/M2 | HEIGHT: 62 IN | OXYGEN SATURATION: 95 % | WEIGHT: 148.81 LBS | HEART RATE: 93 BPM | RESPIRATION RATE: 18 BRPM | DIASTOLIC BLOOD PRESSURE: 78 MMHG

## 2022-06-02 DIAGNOSIS — E78.5 HYPERLIPIDEMIA, UNSPECIFIED HYPERLIPIDEMIA TYPE: ICD-10-CM

## 2022-06-02 DIAGNOSIS — Z01.818 PREOPERATIVE EXAMINATION: Primary | ICD-10-CM

## 2022-06-02 DIAGNOSIS — D75.839 THROMBOCYTOSIS: ICD-10-CM

## 2022-06-02 DIAGNOSIS — R79.1 ABNORMAL COAGULATION PROFILE: ICD-10-CM

## 2022-06-02 DIAGNOSIS — J44.9 CHRONIC OBSTRUCTIVE PULMONARY DISEASE, UNSPECIFIED COPD TYPE: ICD-10-CM

## 2022-06-02 DIAGNOSIS — Z01.818 PREOPERATIVE EXAMINATION: ICD-10-CM

## 2022-06-02 PROBLEM — S06.0X1A CONCUSSION WTH LOSS OF CONSCIOUSNESS OF 30 MINUTES OR LESS: Status: RESOLVED | Noted: 2019-06-30 | Resolved: 2022-06-02

## 2022-06-02 LAB
ALBUMIN SERPL BCP-MCNC: 3.9 G/DL (ref 3.5–5.2)
ALP SERPL-CCNC: 61 U/L (ref 55–135)
ALT SERPL W/O P-5'-P-CCNC: 11 U/L (ref 10–44)
ANION GAP SERPL CALC-SCNC: 9 MMOL/L (ref 8–16)
AST SERPL-CCNC: 18 U/L (ref 10–40)
BASOPHILS # BLD AUTO: 0.09 K/UL (ref 0–0.2)
BASOPHILS NFR BLD: 1 % (ref 0–1.9)
BILIRUB SERPL-MCNC: 0.6 MG/DL (ref 0.1–1)
BUN SERPL-MCNC: 8 MG/DL (ref 8–23)
CALCIUM SERPL-MCNC: 9.5 MG/DL (ref 8.7–10.5)
CHLORIDE SERPL-SCNC: 104 MMOL/L (ref 95–110)
CHOLEST SERPL-MCNC: 251 MG/DL (ref 120–199)
CHOLEST/HDLC SERPL: 4.4 {RATIO} (ref 2–5)
CO2 SERPL-SCNC: 25 MMOL/L (ref 23–29)
CREAT SERPL-MCNC: 0.8 MG/DL (ref 0.5–1.4)
DIFFERENTIAL METHOD: ABNORMAL
EOSINOPHIL # BLD AUTO: 0.4 K/UL (ref 0–0.5)
EOSINOPHIL NFR BLD: 4.1 % (ref 0–8)
ERYTHROCYTE [DISTWIDTH] IN BLOOD BY AUTOMATED COUNT: 14.4 % (ref 11.5–14.5)
EST. GFR  (AFRICAN AMERICAN): >60 ML/MIN/1.73 M^2
EST. GFR  (NON AFRICAN AMERICAN): >60 ML/MIN/1.73 M^2
GLUCOSE SERPL-MCNC: 77 MG/DL (ref 70–110)
HCT VFR BLD AUTO: 45.1 % (ref 37–48.5)
HDLC SERPL-MCNC: 57 MG/DL (ref 40–75)
HDLC SERPL: 22.7 % (ref 20–50)
HGB BLD-MCNC: 14.2 G/DL (ref 12–16)
IMM GRANULOCYTES # BLD AUTO: 0.02 K/UL (ref 0–0.04)
IMM GRANULOCYTES NFR BLD AUTO: 0.2 % (ref 0–0.5)
INR PPP: 1 (ref 0.8–1.2)
LDLC SERPL CALC-MCNC: 172.2 MG/DL (ref 63–159)
LYMPHOCYTES # BLD AUTO: 2.8 K/UL (ref 1–4.8)
LYMPHOCYTES NFR BLD: 31.6 % (ref 18–48)
MCH RBC QN AUTO: 30.7 PG (ref 27–31)
MCHC RBC AUTO-ENTMCNC: 31.5 G/DL (ref 32–36)
MCV RBC AUTO: 98 FL (ref 82–98)
MONOCYTES # BLD AUTO: 0.8 K/UL (ref 0.3–1)
MONOCYTES NFR BLD: 8.5 % (ref 4–15)
NEUTROPHILS # BLD AUTO: 4.9 K/UL (ref 1.8–7.7)
NEUTROPHILS NFR BLD: 54.6 % (ref 38–73)
NONHDLC SERPL-MCNC: 194 MG/DL
NRBC BLD-RTO: 0 /100 WBC
PLATELET # BLD AUTO: 422 K/UL (ref 150–450)
PMV BLD AUTO: 10.7 FL (ref 9.2–12.9)
POTASSIUM SERPL-SCNC: 3.9 MMOL/L (ref 3.5–5.1)
PROT SERPL-MCNC: 7 G/DL (ref 6–8.4)
PROTHROMBIN TIME: 10 SEC (ref 9–12.5)
RBC # BLD AUTO: 4.62 M/UL (ref 4–5.4)
SODIUM SERPL-SCNC: 138 MMOL/L (ref 136–145)
TRIGL SERPL-MCNC: 109 MG/DL (ref 30–150)
WBC # BLD AUTO: 8.98 K/UL (ref 3.9–12.7)

## 2022-06-02 PROCEDURE — 1126F AMNT PAIN NOTED NONE PRSNT: CPT | Mod: CPTII,S$GLB,, | Performed by: FAMILY MEDICINE

## 2022-06-02 PROCEDURE — 1159F MED LIST DOCD IN RCRD: CPT | Mod: CPTII,S$GLB,, | Performed by: FAMILY MEDICINE

## 2022-06-02 PROCEDURE — 1126F PR PAIN SEVERITY QUANTIFIED, NO PAIN PRESENT: ICD-10-PCS | Mod: CPTII,S$GLB,, | Performed by: FAMILY MEDICINE

## 2022-06-02 PROCEDURE — 85025 COMPLETE CBC W/AUTO DIFF WBC: CPT | Performed by: FAMILY MEDICINE

## 2022-06-02 PROCEDURE — 93005 EKG 12-LEAD: ICD-10-PCS | Mod: S$GLB,,, | Performed by: FAMILY MEDICINE

## 2022-06-02 PROCEDURE — 99213 OFFICE O/P EST LOW 20 MIN: CPT | Mod: S$GLB,,, | Performed by: FAMILY MEDICINE

## 2022-06-02 PROCEDURE — 99499 UNLISTED E&M SERVICE: CPT | Mod: S$GLB,,, | Performed by: FAMILY MEDICINE

## 2022-06-02 PROCEDURE — 36415 COLL VENOUS BLD VENIPUNCTURE: CPT | Mod: PN | Performed by: FAMILY MEDICINE

## 2022-06-02 PROCEDURE — 80061 LIPID PANEL: CPT | Performed by: FAMILY MEDICINE

## 2022-06-02 PROCEDURE — 93010 EKG 12-LEAD: ICD-10-PCS | Mod: S$GLB,,, | Performed by: INTERNAL MEDICINE

## 2022-06-02 PROCEDURE — 3288F FALL RISK ASSESSMENT DOCD: CPT | Mod: CPTII,S$GLB,, | Performed by: FAMILY MEDICINE

## 2022-06-02 PROCEDURE — 1101F PT FALLS ASSESS-DOCD LE1/YR: CPT | Mod: CPTII,S$GLB,, | Performed by: FAMILY MEDICINE

## 2022-06-02 PROCEDURE — 3074F SYST BP LT 130 MM HG: CPT | Mod: CPTII,S$GLB,, | Performed by: FAMILY MEDICINE

## 2022-06-02 PROCEDURE — 3074F PR MOST RECENT SYSTOLIC BLOOD PRESSURE < 130 MM HG: ICD-10-PCS | Mod: CPTII,S$GLB,, | Performed by: FAMILY MEDICINE

## 2022-06-02 PROCEDURE — 3288F PR FALLS RISK ASSESSMENT DOCUMENTED: ICD-10-PCS | Mod: CPTII,S$GLB,, | Performed by: FAMILY MEDICINE

## 2022-06-02 PROCEDURE — 99999 PR PBB SHADOW E&M-EST. PATIENT-LVL III: CPT | Mod: PBBFAC,,, | Performed by: FAMILY MEDICINE

## 2022-06-02 PROCEDURE — 1160F RVW MEDS BY RX/DR IN RCRD: CPT | Mod: CPTII,S$GLB,, | Performed by: FAMILY MEDICINE

## 2022-06-02 PROCEDURE — 99213 PR OFFICE/OUTPT VISIT, EST, LEVL III, 20-29 MIN: ICD-10-PCS | Mod: S$GLB,,, | Performed by: FAMILY MEDICINE

## 2022-06-02 PROCEDURE — 93005 ELECTROCARDIOGRAM TRACING: CPT | Mod: S$GLB,,, | Performed by: FAMILY MEDICINE

## 2022-06-02 PROCEDURE — 85610 PROTHROMBIN TIME: CPT | Performed by: FAMILY MEDICINE

## 2022-06-02 PROCEDURE — 80053 COMPREHEN METABOLIC PANEL: CPT | Performed by: FAMILY MEDICINE

## 2022-06-02 PROCEDURE — 3078F PR MOST RECENT DIASTOLIC BLOOD PRESSURE < 80 MM HG: ICD-10-PCS | Mod: CPTII,S$GLB,, | Performed by: FAMILY MEDICINE

## 2022-06-02 PROCEDURE — 99499 RISK ADDL DX/OHS AUDIT: ICD-10-PCS | Mod: S$GLB,,, | Performed by: FAMILY MEDICINE

## 2022-06-02 PROCEDURE — 3008F PR BODY MASS INDEX (BMI) DOCUMENTED: ICD-10-PCS | Mod: CPTII,S$GLB,, | Performed by: FAMILY MEDICINE

## 2022-06-02 PROCEDURE — 93010 ELECTROCARDIOGRAM REPORT: CPT | Mod: S$GLB,,, | Performed by: INTERNAL MEDICINE

## 2022-06-02 PROCEDURE — 1160F PR REVIEW ALL MEDS BY PRESCRIBER/CLIN PHARMACIST DOCUMENTED: ICD-10-PCS | Mod: CPTII,S$GLB,, | Performed by: FAMILY MEDICINE

## 2022-06-02 PROCEDURE — 1101F PR PT FALLS ASSESS DOC 0-1 FALLS W/OUT INJ PAST YR: ICD-10-PCS | Mod: CPTII,S$GLB,, | Performed by: FAMILY MEDICINE

## 2022-06-02 PROCEDURE — 3008F BODY MASS INDEX DOCD: CPT | Mod: CPTII,S$GLB,, | Performed by: FAMILY MEDICINE

## 2022-06-02 PROCEDURE — 99999 PR PBB SHADOW E&M-EST. PATIENT-LVL III: ICD-10-PCS | Mod: PBBFAC,,, | Performed by: FAMILY MEDICINE

## 2022-06-02 PROCEDURE — 1159F PR MEDICATION LIST DOCUMENTED IN MEDICAL RECORD: ICD-10-PCS | Mod: CPTII,S$GLB,, | Performed by: FAMILY MEDICINE

## 2022-06-02 PROCEDURE — 3078F DIAST BP <80 MM HG: CPT | Mod: CPTII,S$GLB,, | Performed by: FAMILY MEDICINE

## 2022-06-02 RX ORDER — CETIRIZINE HYDROCHLORIDE 10 MG/1
10 TABLET ORAL DAILY
COMMUNITY

## 2022-06-02 NOTE — PROGRESS NOTES
"Subjective:       Patient ID: Concha Hess is a 71 y.o. female.    Chief Complaint: Pre-op Exam (For face lift )    Schedule for facelift on 7/14. No recent illness or injury. No prior surgical complications. No hx of CAD, CVA, DM, or CKD.    Review of Systems   Constitutional: Negative for chills, fatigue and fever.   HENT: Negative for congestion and trouble swallowing.    Eyes: Negative for visual disturbance.   Respiratory: Negative for cough and shortness of breath.    Cardiovascular: Negative for chest pain and palpitations.   Gastrointestinal: Negative for abdominal pain, nausea and vomiting.   Genitourinary: Negative for difficulty urinating.   Musculoskeletal: Negative for arthralgias and joint swelling.   Skin: Negative for rash and wound.   Allergic/Immunologic: Negative for immunocompromised state.   Neurological: Negative for dizziness.   Hematological: Does not bruise/bleed easily.   Psychiatric/Behavioral: Negative for agitation, confusion and sleep disturbance.       Objective:      Vitals:    06/02/22 1104   BP: 116/78   BP Location: Right arm   Patient Position: Sitting   BP Method: Medium (Manual)   Pulse: 93   Resp: 18   SpO2: 95%   Weight: 67.5 kg (148 lb 13 oz)   Height: 5' 2" (1.575 m)     Physical Exam  Vitals and nursing note reviewed.   Constitutional:       Appearance: She is well-developed.   HENT:      Head: Normocephalic and atraumatic.      Mouth/Throat:      Mouth: Mucous membranes are moist.      Pharynx: Oropharynx is clear.      Comments: Mallampati class 2  Cardiovascular:      Rate and Rhythm: Normal rate and regular rhythm.      Heart sounds: Normal heart sounds.   Pulmonary:      Effort: Pulmonary effort is normal.      Breath sounds: Normal breath sounds.   Skin:     General: Skin is warm and dry.   Neurological:      Mental Status: She is alert and oriented to person, place, and time.         Lab Results   Component Value Date    WBC 8.98 06/02/2022    HGB 14.2 " 06/02/2022    HCT 45.1 06/02/2022     06/02/2022    CHOL 251 (H) 06/02/2022    TRIG 109 06/02/2022    HDL 57 06/02/2022    ALT 11 06/02/2022    AST 18 06/02/2022     06/02/2022    K 3.9 06/02/2022     06/02/2022    CREATININE 0.8 06/02/2022    BUN 8 06/02/2022    CO2 25 06/02/2022    TSH 4.17 10/21/2019    INR 1.0 06/02/2022   X-Ray Chest PA And Lateral  Order: [id number]   Status: Final result     Visible to patient: Yes (seen)     Next appt: None     Dx: Chronic obstructive pulmonary disease...     0 Result Notes    Details    Reading Physician Reading Date Result Priority   Susu Le MD  [phone]  [phone] 5/3/2022 Routine     Narrative & Impression  EXAMINATION:  XR CHEST PA AND LATERAL     CLINICAL HISTORY:  Chronic obstructive pulmonary disease, unspecified     TECHNIQUE:  PA and lateral views of the chest were performed.     COMPARISON:  07/01/2019     FINDINGS:  Right lung is well expanded.  Stable elevation left hemidiaphragm.  No acute consolidation, pleural effusion, or pneumothorax seen.  Cardiac silhouette is normal in size.     Impression:     Stable elevation left hemidiaphragm.  Lungs remain clear.        Electronically signed by: Susu Le  Date:                                            05/03/2022  Time:                                           13:23        Assessment:       1. Preoperative examination    2. Chronic obstructive pulmonary disease, unspecified COPD type    3. Hyperlipidemia, unspecified hyperlipidemia type    4. Thrombocytosis    5. Abnormal coagulation profile         Plan:       Preoperative examination  -     EKG 12-lead  -     CBC Auto Differential; Future; Expected date: 06/02/2022  -     Comprehensive Metabolic Panel; Future; Expected date: 06/02/2022  -     Protime-INR; Future; Expected date: 06/02/2022  No acute EKG changes.  Medically cleared for upcoming surgery under general anesthesia.  Chronic obstructive pulmonary disease,  unspecified COPD type  Stable  Hyperlipidemia, unspecified hyperlipidemia type  -     Comprehensive Metabolic Panel; Future; Expected date: 06/02/2022  -     Lipid Panel; Future; Expected date: 06/02/2022    Thrombocytosis  -     Protime-INR; Future; Expected date: 06/02/2022    Abnormal coagulation profile   -     Protime-INR; Future; Expected date: 06/02/2022    Due for 2nd dose of Shingrix, as well as Pneumovax 23  Medication List with Changes/Refills   New Medications    PNEUMOCOCCAL 23-MARTÍN PS (PNEUMOVAX-23) 25 MCG/0.5 ML VACCINE    Inject into the muscle.    VARICELLA-ZOSTER GE-AS01B, PF, (SHINGRIX, PF,) 50 MCG/0.5 ML INJECTION    Inject 0.5 mLs into the muscle once. for 1 dose   Current Medications    ALBUTEROL (PROVENTIL/VENTOLIN HFA) 90 MCG/ACTUATION INHALER    Inhale 2 puffs into the lungs every 4 (four) hours as needed for Wheezing or Shortness of Breath. Rescue    B COMPLEX VITAMINS (B-COMPLEX) TABLET    Take 1 tablet by mouth once daily.    BREO ELLIPTA 100-25 MCG/DOSE DISKUS INHALER    Inhale 1 puff into the lungs once daily. Controller    CALCIUM CARBONATE-VIT D3- MG CALCIUM- 400 UNIT TAB    Take 1 tablet by mouth 2 (two) times daily.    CETIRIZINE (ZYRTEC) 10 MG TABLET    Take 10 mg by mouth once daily.    CLORAZEPATE (TRANXENE) 7.5 MG TAB    TAKE 1 TABLET(7.5 MG) BY MOUTH TWICE DAILY AS NEEDED FOR ANXIETY    ESCITALOPRAM OXALATE (LEXAPRO) 10 MG TABLET    Take 1 tablet (10 mg total) by mouth once daily.    ESOMEPRAZOLE (NEXIUM) 20 MG CAPSULE    Take 20 mg by mouth before breakfast.    MULTIVITS-MIN/IRON/FA/LUTEIN (ULTIMATE WOMEN'S COMPLETE 50+ ORAL)    Take by mouth.   Discontinued Medications    BENZONATATE (TESSALON) 100 MG CAPSULE    Take 1 capsule (100 mg total) by mouth 3 (three) times daily as needed.    ESTRADIOL (ESTRACE) 0.01 % (0.1 MG/GRAM) VAGINAL CREAM    INSERT 1/2 GRAM VAGINALLY AT NIGHT FOR 2 WEEKS THEN TWICE A WEEK    PREDNISONE (DELTASONE) 10 MG TABLET    Take 3 tablets x 7days  then 2 tabs/    PREDNISONE (DELTASONE) 10 MG TABLET    Take 3 tabs Q AM x 7days then 2 tabs x 7days

## 2022-06-03 ENCOUNTER — PATIENT MESSAGE (OUTPATIENT)
Dept: PRIMARY CARE CLINIC | Facility: CLINIC | Age: 72
End: 2022-06-03
Payer: MEDICARE

## 2022-06-07 ENCOUNTER — PATIENT MESSAGE (OUTPATIENT)
Dept: OBSTETRICS AND GYNECOLOGY | Facility: CLINIC | Age: 72
End: 2022-06-07
Payer: MEDICARE

## 2022-06-07 DIAGNOSIS — N93.9 ABNORMAL UTERINE BLEEDING (AUB): Primary | ICD-10-CM

## 2022-06-07 NOTE — TELEPHONE ENCOUNTER
Pt missed 10 days worth of progesterone.  Had a hormone pellet inserted on 5/4.  Pt is currently cramping and bleeding bright red, filling up pads the last 4 days. Currently out of town but will be back on Sunday.    Please advise, thanks

## 2022-06-15 ENCOUNTER — PATIENT MESSAGE (OUTPATIENT)
Dept: PRIMARY CARE CLINIC | Facility: CLINIC | Age: 72
End: 2022-06-15
Payer: MEDICARE

## 2022-06-17 ENCOUNTER — PATIENT MESSAGE (OUTPATIENT)
Dept: PRIMARY CARE CLINIC | Facility: CLINIC | Age: 72
End: 2022-06-17
Payer: MEDICARE

## 2022-07-06 ENCOUNTER — OFFICE VISIT (OUTPATIENT)
Dept: OBSTETRICS AND GYNECOLOGY | Facility: CLINIC | Age: 72
End: 2022-07-06
Attending: OBSTETRICS & GYNECOLOGY
Payer: MEDICARE

## 2022-07-06 VITALS — SYSTOLIC BLOOD PRESSURE: 106 MMHG | HEIGHT: 62 IN | BODY MASS INDEX: 27.22 KG/M2 | DIASTOLIC BLOOD PRESSURE: 74 MMHG

## 2022-07-06 DIAGNOSIS — N95.0 POSTMENOPAUSAL BLEEDING: Primary | ICD-10-CM

## 2022-07-06 DIAGNOSIS — Z11.51 SCREENING FOR HUMAN PAPILLOMAVIRUS: ICD-10-CM

## 2022-07-06 DIAGNOSIS — Z01.419 WELL FEMALE EXAM WITH ROUTINE GYNECOLOGICAL EXAM: ICD-10-CM

## 2022-07-06 PROCEDURE — 3074F SYST BP LT 130 MM HG: CPT | Mod: CPTII,S$GLB,, | Performed by: OBSTETRICS & GYNECOLOGY

## 2022-07-06 PROCEDURE — 58100 PR BIOPSY OF UTERUS LINING: ICD-10-PCS | Mod: S$GLB,,, | Performed by: OBSTETRICS & GYNECOLOGY

## 2022-07-06 PROCEDURE — 99499 UNLISTED E&M SERVICE: CPT | Mod: S$GLB,,, | Performed by: OBSTETRICS & GYNECOLOGY

## 2022-07-06 PROCEDURE — 88305 TISSUE EXAM BY PATHOLOGIST: ICD-10-PCS | Mod: 26,,, | Performed by: PATHOLOGY

## 2022-07-06 PROCEDURE — 1126F PR PAIN SEVERITY QUANTIFIED, NO PAIN PRESENT: ICD-10-PCS | Mod: CPTII,S$GLB,, | Performed by: OBSTETRICS & GYNECOLOGY

## 2022-07-06 PROCEDURE — 1126F AMNT PAIN NOTED NONE PRSNT: CPT | Mod: CPTII,S$GLB,, | Performed by: OBSTETRICS & GYNECOLOGY

## 2022-07-06 PROCEDURE — 99999 PR PBB SHADOW E&M-EST. PATIENT-LVL III: CPT | Mod: PBBFAC,,, | Performed by: OBSTETRICS & GYNECOLOGY

## 2022-07-06 PROCEDURE — 1101F PT FALLS ASSESS-DOCD LE1/YR: CPT | Mod: CPTII,S$GLB,, | Performed by: OBSTETRICS & GYNECOLOGY

## 2022-07-06 PROCEDURE — 99499 NO LOS: ICD-10-PCS | Mod: S$GLB,,, | Performed by: OBSTETRICS & GYNECOLOGY

## 2022-07-06 PROCEDURE — 58100 BIOPSY OF UTERUS LINING: CPT | Mod: S$GLB,,, | Performed by: OBSTETRICS & GYNECOLOGY

## 2022-07-06 PROCEDURE — 1159F MED LIST DOCD IN RCRD: CPT | Mod: CPTII,S$GLB,, | Performed by: OBSTETRICS & GYNECOLOGY

## 2022-07-06 PROCEDURE — 88175 CYTOPATH C/V AUTO FLUID REDO: CPT | Performed by: OBSTETRICS & GYNECOLOGY

## 2022-07-06 PROCEDURE — 3288F FALL RISK ASSESSMENT DOCD: CPT | Mod: CPTII,S$GLB,, | Performed by: OBSTETRICS & GYNECOLOGY

## 2022-07-06 PROCEDURE — 87624 HPV HI-RISK TYP POOLED RSLT: CPT | Performed by: OBSTETRICS & GYNECOLOGY

## 2022-07-06 PROCEDURE — 3078F DIAST BP <80 MM HG: CPT | Mod: CPTII,S$GLB,, | Performed by: OBSTETRICS & GYNECOLOGY

## 2022-07-06 PROCEDURE — 3008F BODY MASS INDEX DOCD: CPT | Mod: CPTII,S$GLB,, | Performed by: OBSTETRICS & GYNECOLOGY

## 2022-07-06 PROCEDURE — 1101F PR PT FALLS ASSESS DOC 0-1 FALLS W/OUT INJ PAST YR: ICD-10-PCS | Mod: CPTII,S$GLB,, | Performed by: OBSTETRICS & GYNECOLOGY

## 2022-07-06 PROCEDURE — 3288F PR FALLS RISK ASSESSMENT DOCUMENTED: ICD-10-PCS | Mod: CPTII,S$GLB,, | Performed by: OBSTETRICS & GYNECOLOGY

## 2022-07-06 PROCEDURE — 3078F PR MOST RECENT DIASTOLIC BLOOD PRESSURE < 80 MM HG: ICD-10-PCS | Mod: CPTII,S$GLB,, | Performed by: OBSTETRICS & GYNECOLOGY

## 2022-07-06 PROCEDURE — 88305 TISSUE EXAM BY PATHOLOGIST: CPT | Performed by: PATHOLOGY

## 2022-07-06 PROCEDURE — 3074F PR MOST RECENT SYSTOLIC BLOOD PRESSURE < 130 MM HG: ICD-10-PCS | Mod: CPTII,S$GLB,, | Performed by: OBSTETRICS & GYNECOLOGY

## 2022-07-06 PROCEDURE — 88305 TISSUE EXAM BY PATHOLOGIST: CPT | Mod: 26,,, | Performed by: PATHOLOGY

## 2022-07-06 PROCEDURE — 1159F PR MEDICATION LIST DOCUMENTED IN MEDICAL RECORD: ICD-10-PCS | Mod: CPTII,S$GLB,, | Performed by: OBSTETRICS & GYNECOLOGY

## 2022-07-06 PROCEDURE — 99999 PR PBB SHADOW E&M-EST. PATIENT-LVL III: ICD-10-PCS | Mod: PBBFAC,,, | Performed by: OBSTETRICS & GYNECOLOGY

## 2022-07-06 PROCEDURE — 3008F PR BODY MASS INDEX (BMI) DOCUMENTED: ICD-10-PCS | Mod: CPTII,S$GLB,, | Performed by: OBSTETRICS & GYNECOLOGY

## 2022-07-06 RX ORDER — ERGOCALCIFEROL 1.25 MG/1
50000 CAPSULE ORAL
COMMUNITY

## 2022-07-07 ENCOUNTER — PATIENT MESSAGE (OUTPATIENT)
Dept: OBSTETRICS AND GYNECOLOGY | Facility: CLINIC | Age: 72
End: 2022-07-07
Payer: MEDICARE

## 2022-07-07 RX ORDER — PROGESTERONE 100 MG/1
100 CAPSULE ORAL NIGHTLY
Qty: 30 CAPSULE | Refills: 10 | Status: SHIPPED | OUTPATIENT
Start: 2022-07-07 | End: 2023-10-03

## 2022-07-11 NOTE — PROGRESS NOTES
Procedure:  Endometrial biopsy    Diagnosis: pmb    The risks, benefits, and alternatives were discussed prior to the procedure. Patient signed consents.      Procedure note:  The speculum was placed and betadine was applied to the cervix.  The anterior lip of the cervix was grasped with a single tooth tenaculum.  The endometrial pipelle was passed and an amount of tissue was obtained.  The instruments were removed. Silver nitrate was applied for hemostasis.  The specimen was sent to pathology for evaluation.  There were no complications.    Assessment and Plan:  Postmenopausal bleeding  -     US Pelvis Comp with Transvag NON-OB (xpd; Future; Expected date: 07/06/2022  -     Specimen to Pathology, Ob/Gyn    Well female exam with routine gynecological exam  -     Cancel: Liquid-Based Pap Smear, Screening    Screening for human papillomavirus  -     Cancel: HPV High Risk Genotypes, PCR    Other orders  -     Pap Smear, Thin Prep with Reflex to HPV  -     HPV DNA, High Risk with Reflex to Genotype 16,18      Ibuprofen 600 mg every 6 hours prn cramping.  Call the office for heavy vaginal bleeding, fever, nausea, vomiting, or severe lower abdominal pain.

## 2022-07-13 LAB
CLINICAL INFO: NORMAL
CYTO CVX: NORMAL
CYTOLOGIST CVX/VAG CYTO: NORMAL
CYTOLOGIST CVX/VAG CYTO: NORMAL
CYTOLOGY CMNT CVX/VAG CYTO-IMP: NORMAL
CYTOLOGY PAP THIN PREP EXPLANATION: NORMAL
DATE OF PREVIOUS PAP: NORMAL
DATE PREVIOUS BX: NO
FINAL PATHOLOGIC DIAGNOSIS: NORMAL
GEN CATEG CVX/VAG CYTO-IMP: NORMAL
HPV I/H RISK 4 DNA CVX QL NAA+PROBE: NOT DETECTED
LMP START DATE: NORMAL
Lab: NORMAL
MICROORGANISM CVX/VAG CYTO: NORMAL
PATHOLOGIST CVX/VAG CYTO: NORMAL
SERVICE CMNT-IMP: NORMAL
SPECIMEN SOURCE CVX/VAG CYTO: NORMAL
STAT OF ADQ CVX/VAG CYTO-IMP: NORMAL

## 2022-08-04 RX ORDER — ESCITALOPRAM OXALATE 10 MG/1
10 TABLET ORAL DAILY
Qty: 90 TABLET | Refills: 1 | Status: SHIPPED | OUTPATIENT
Start: 2022-08-04 | End: 2023-01-30 | Stop reason: SDUPTHER

## 2022-09-27 ENCOUNTER — PATIENT MESSAGE (OUTPATIENT)
Dept: PRIMARY CARE CLINIC | Facility: CLINIC | Age: 72
End: 2022-09-27
Payer: MEDICARE

## 2023-01-26 DIAGNOSIS — Z78.0 MENOPAUSE: ICD-10-CM

## 2023-02-07 DIAGNOSIS — Z00.00 ENCOUNTER FOR MEDICARE ANNUAL WELLNESS EXAM: ICD-10-CM

## 2023-02-09 DIAGNOSIS — Z00.00 ENCOUNTER FOR MEDICARE ANNUAL WELLNESS EXAM: ICD-10-CM

## 2023-03-11 ENCOUNTER — OFFICE VISIT (OUTPATIENT)
Dept: URGENT CARE | Facility: CLINIC | Age: 73
End: 2023-03-11
Payer: MEDICARE

## 2023-03-11 VITALS
HEIGHT: 62 IN | DIASTOLIC BLOOD PRESSURE: 79 MMHG | RESPIRATION RATE: 19 BRPM | OXYGEN SATURATION: 98 % | HEART RATE: 86 BPM | WEIGHT: 148 LBS | BODY MASS INDEX: 27.23 KG/M2 | TEMPERATURE: 98 F | SYSTOLIC BLOOD PRESSURE: 130 MMHG

## 2023-03-11 DIAGNOSIS — R05.1 ACUTE COUGH: Primary | ICD-10-CM

## 2023-03-11 DIAGNOSIS — U07.1 COVID-19 VIRUS DETECTED: ICD-10-CM

## 2023-03-11 DIAGNOSIS — U07.1 COVID: ICD-10-CM

## 2023-03-11 DIAGNOSIS — J06.9 VIRAL URI WITH COUGH: ICD-10-CM

## 2023-03-11 DIAGNOSIS — J41.0 SIMPLE CHRONIC BRONCHITIS: ICD-10-CM

## 2023-03-11 DIAGNOSIS — Z79.899 ON SSRI THERAPY: ICD-10-CM

## 2023-03-11 LAB
CTP QC/QA: YES
SARS-COV-2 AG RESP QL IA.RAPID: POSITIVE

## 2023-03-11 PROCEDURE — 99499 RISK ADDL DX/OHS AUDIT: ICD-10-PCS | Mod: S$GLB,,, | Performed by: FAMILY MEDICINE

## 2023-03-11 PROCEDURE — 99214 PR OFFICE/OUTPT VISIT, EST, LEVL IV, 30-39 MIN: ICD-10-PCS | Mod: S$GLB,CS,, | Performed by: FAMILY MEDICINE

## 2023-03-11 PROCEDURE — 99214 OFFICE O/P EST MOD 30 MIN: CPT | Mod: S$GLB,CS,, | Performed by: FAMILY MEDICINE

## 2023-03-11 PROCEDURE — 87811 SARS CORONAVIRUS 2 ANTIGEN POCT, MANUAL READ: ICD-10-PCS | Mod: QW,S$GLB,, | Performed by: FAMILY MEDICINE

## 2023-03-11 PROCEDURE — 87811 SARS-COV-2 COVID19 W/OPTIC: CPT | Mod: QW,S$GLB,, | Performed by: FAMILY MEDICINE

## 2023-03-11 PROCEDURE — 99499 UNLISTED E&M SERVICE: CPT | Mod: S$GLB,,, | Performed by: FAMILY MEDICINE

## 2023-03-11 RX ORDER — MOLNUPIRAVIR 200 MG/1
800 CAPSULE ORAL EVERY 12 HOURS
Qty: 40 CAPSULE | Refills: 0 | Status: SHIPPED | OUTPATIENT
Start: 2023-03-11 | End: 2023-03-16

## 2023-03-11 RX ORDER — PROMETHAZINE HYDROCHLORIDE AND DEXTROMETHORPHAN HYDROBROMIDE 6.25; 15 MG/5ML; MG/5ML
5 SYRUP ORAL EVERY 4 HOURS PRN
Qty: 240 ML | Refills: 0 | Status: SHIPPED | OUTPATIENT
Start: 2023-03-11 | End: 2023-03-21

## 2023-03-11 RX ORDER — BENZONATATE 200 MG/1
200 CAPSULE ORAL 3 TIMES DAILY PRN
Qty: 30 CAPSULE | Refills: 0 | Status: SHIPPED | OUTPATIENT
Start: 2023-03-11 | End: 2023-03-21

## 2023-03-11 NOTE — PROGRESS NOTES
"Subjective:       Patient ID: Concha Hess is a 72 y.o. female.    Vitals:  height is 5' 2" (1.575 m) and weight is 67.1 kg (148 lb). Her temperature is 97.9 °F (36.6 °C). Her blood pressure is 130/79 and her pulse is 86. Her respiration is 19 and oxygen saturation is 98%.     Chief Complaint: Cough    Cough  This is a new problem. The current episode started in the past 7 days (3 days). The problem has been gradually worsening. The problem occurs every few minutes. The cough is Productive of sputum. Associated symptoms include chills, headaches, nasal congestion, postnasal drip, a sore throat (in the morning) and sweats. Pertinent negatives include no ear congestion, ear pain, hemoptysis, myalgias, rash, shortness of breath, weight loss or wheezing. Nothing aggravates the symptoms. She has tried OTC cough suppressant (nasal spray) for the symptoms. The treatment provided mild relief. Her past medical history is significant for bronchitis. There is no history of asthma, COPD, emphysema or pneumonia.     Constitution: Positive for chills.   HENT:  Positive for postnasal drip and sore throat (in the morning). Negative for ear pain.    Respiratory:  Positive for cough. Negative for bloody sputum, shortness of breath and wheezing.    Musculoskeletal:  Negative for muscle ache.   Skin:  Negative for rash.   Neurological:  Positive for headaches.     Objective:      Vitals:    03/11/23 1402   BP: 130/79   Pulse: 86   Resp: 19   Temp: 97.9 °F (36.6 °C)   SpO2: 98%   Weight: 67.1 kg (148 lb)   Height: 5' 2" (1.575 m)      Physical Exam   Constitutional: She is oriented to person, place, and time. She appears well-developed. She is cooperative.  Non-toxic appearance. She does not appear ill. No distress.   HENT:   Head: Normocephalic and atraumatic.   Ears:   Right Ear: Hearing, tympanic membrane, external ear and ear canal normal.   Left Ear: Hearing, tympanic membrane, external ear and ear canal normal.   Nose: " Congestion present. No mucosal edema, rhinorrhea or nasal deformity. No epistaxis. Right sinus exhibits no maxillary sinus tenderness and no frontal sinus tenderness. Left sinus exhibits no maxillary sinus tenderness and no frontal sinus tenderness.   Mouth/Throat: Uvula is midline, oropharynx is clear and moist and mucous membranes are normal. No trismus in the jaw. Normal dentition. No uvula swelling. No oropharyngeal exudate, posterior oropharyngeal edema or posterior oropharyngeal erythema.   Eyes: Conjunctivae and lids are normal. No scleral icterus.   Neck: Trachea normal and phonation normal. Neck supple. No edema present. No erythema present. No neck rigidity present.   Cardiovascular: Normal rate, regular rhythm, normal heart sounds and normal pulses.   Pulmonary/Chest: Effort normal and breath sounds normal. No respiratory distress. She has no decreased breath sounds. She has no rhonchi.   Abdominal: Normal appearance and bowel sounds are normal. Soft.   Musculoskeletal: Normal range of motion.         General: Normal range of motion.   Neurological: no focal deficit. She is alert and oriented to person, place, and time. She exhibits normal muscle tone. Coordination normal.   Skin: Skin is warm, intact and not diaphoretic. Capillary refill takes less than 2 seconds.   Psychiatric: Her speech is normal and behavior is normal. Judgment and thought content normal.   Nursing note and vitals reviewed.      Results for orders placed or performed in visit on 03/11/23   SARS Coronavirus 2 Antigen, POCT Manual Read   Result Value Ref Range    SARS Coronavirus 2 Antigen Positive (A) Negative     Acceptable Yes       Assessment:       1. Acute cough    2. COVID    3. Viral URI with cough    4. On SSRI therapy    5. Simple chronic bronchitis          Plan:         Acute cough  -     SARS Coronavirus 2 Antigen, POCT Manual Read  -     promethazine-dextromethorphan (PROMETHAZINE-DM) 6.25-15 mg/5 mL Syrp;  Take 5 mLs by mouth every 4 (four) hours as needed (cough).  Dispense: 240 mL; Refill: 0  -     benzonatate (TESSALON) 200 MG capsule; Take 1 capsule (200 mg total) by mouth 3 (three) times daily as needed for Cough.  Dispense: 30 capsule; Refill: 0    2. COVID  -     molnupiravir (LAGEVRIO, EUA,) 200 mg capsule (EUA); Take 4 capsules (800 mg total) by mouth every 12 (twelve) hours. for 5 days  Dispense: 40 capsule; Refill: 0    3. Viral URI with cough  -     promethazine-dextromethorphan (PROMETHAZINE-DM) 6.25-15 mg/5 mL Syrp; Take 5 mLs by mouth every 4 (four) hours as needed (cough).  Dispense: 240 mL; Refill: 0  -     benzonatate (TESSALON) 200 MG capsule; Take 1 capsule (200 mg total) by mouth 3 (three) times daily as needed for Cough.  Dispense: 30 capsule; Refill: 0  Normal kidney function    4. On SSRI therapy  On drug drug interaction with Lexapro and Lagevrio    5. Simple chronic bronchitis, history of COPD  Follows with pulmonology. Continue inhaler regimen. I have reviewed last 2 office visit medical documentation from pulmonology.    Patient Instructions   Below are suggestions for symptomatic relief of your upper respiratory symptoms:              -Salt water gargles to soothe throat pain.              -Chloroseptic spray and Cepacol lozenges also help to numb throat pain.              -Warm herbal teas with honey/lemon/ginger can help soothe sore throat and hoarseness              -Nasal saline spray reduces inflammation and dryness.              -Warm face compresses to help with facial sinus pain/pressure.              -Humidifiers and steam can help with nasal dryness and congestion              -Vicks vapor rub at night for chest congestion.              -Flonase OTC or Nasacort OTC for nasal congestion and post-nasal drip. Ok to use twice daily for the first week, then reduce to once daily after symptoms have begun to improve.              -Afrin is a nasal spray that can give immediate relief of  nasal congestion but you cannot use this medication for more than 3 days              -Simple foods like chicken noodle soup.              - Mucinex for congestion or Mucinex DM for cough during the day time. Delsym helps with coughing at night. Mucinex-D if you have sinus pressure/sinus pain or chest congestion. (caution if history of high blood pressure or palpitations). You must increase your water intake when using expectorants (Mucinex).             -Zyrtec/Claritin/Allegra/Xyzal should help with allergies.  -If you DO NOT have Hypertension or any history of palpitations, it is ok to take over the counter Sudafed or Mucinex D or Allegra-D or Claritin-D or Zyrtec-D.  -If you do take one of the above, it is ok to combine that with plain over the counter Mucinex or Allegra or Claritin or Zyrtec. If, for example, you are taking Zyrtec -D, you can combine that with Mucinex, but not Mucinex-D.  If you are taking Mucinex-D, you can combine that with plain Allegra or Claritin or Zyrtec.   -If you DO have Hypertension or palpitations, it is safe to take Coricidin HBP for relief of sinus symptoms.     Your test was POSITIVE for COVID-19 (coronavirus).       Please isolate yourself at home.  You may leave home and/or return to work once the following conditions are met:    If you were not hospitalized and are not moderately to severely immunocompromised:   More than 5 days since symptoms first appeared AND  More than 24 hours fever free without medications AND  Symptoms are improving  Continue to wear a mask around others for 5 additional days.    If you were hospitalized OR are moderately to severely immunocompromised:  More than 20 days since symptoms first appeared  More than 24 hours fever free without medications  Symptoms have improved    If you had no symptoms but tested positive:  More than 5 days since the date of the first positive test (20 days if moderately to severely immunocompromised). If you develop  symptoms, then use the guidelines above.  Continue to wear a mask around others for 5 additional days.      Contact Tracing    As one of the next steps, you will receive a call or text from the [place] [facility] ([facility]) COVID-19 Tracing Team. See the contact information below so you know not to ignore the health departments call. It is important that you contact them back immediately so they can help.      Contact Tracer Number:  [phone]  Caller ID for most carriers: [place] [facility]     What is contact tracing?  Contact tracing is a process that helps identify everyone who has been in close contact with an infected person. Contact tracers let those people know they may have been exposed and guide them on next steps. Confidentiality is important for everyone; no one will be told who may have exposed them to the virus.  Your involvement is important. The more we know about where and how this virus is spreading, the better chance we have at stopping it from spreading further.  What does exposure mean?  Exposure means you have been within 6 feet for more than 15 minutes with a person who has or had COVID-19.  What kind of questions do the contact tracers ask?  A contact tracer will confirm your basic contact information including name, address, phone number, and next of kin, as well as asking about any symptoms you may have had. Theyll also ask you how you think you may have gotten sick, such as places where you may have been exposed to the virus, and people you were with. Those names will never be shared with anyone outside of that call, and will only be used to help trace and stop the spread of the virus.   I have privacy concerns. How will the state use my information?  Your privacy about your health is important. All calls are completed using call centers that use the appropriate health privacy protection measures (HIPAA compliance), meaning that your patient information is safe. No one will ever  ask you any questions related to immigration status. Your health comes first.   Do I have to participate?  You do not have to participate, but we strongly encourage you to. Contact tracing can help us catch and control new outbreaks as theyre developing to keep your friends and family safe.   What if I dont hear from anyone?  If you dont receive a call within 24 hours, you can call the number above right away to inquire about your status. That line is open from 8:00 am - 8:00 p.m., 7 days a week.  Contact tracing saves lives! Together, we have the power to beat this virus and keep our loved ones and neighbors safe.    For more information see CDC link below.      https://www.cdc.gov/coronavirus/2019-ncov/hcp/guidance-prevent-spread.html#precautions        Sources:  [facility]         Seek immediate care in the emergency room in the event of severe abdominal pain, chest pain, respiratory distress, fever unresponsive to antipyretic, dehydration, loss of consciousness, seizure.

## 2023-03-11 NOTE — PATIENT INSTRUCTIONS
Below are suggestions for symptomatic relief of your upper respiratory symptoms:              -Salt water gargles to soothe throat pain.              -Chloroseptic spray and Cepacol lozenges also help to numb throat pain.              -Warm herbal teas with honey/lemon/anthony can help soothe sore throat and hoarseness              -Nasal saline spray reduces inflammation and dryness.              -Warm face compresses to help with facial sinus pain/pressure.              -Humidifiers and steam can help with nasal dryness and congestion              -Vicks vapor rub at night for chest congestion.              -Flonase OTC or Nasacort OTC for nasal congestion and post-nasal drip. Ok to use twice daily for the first week, then reduce to once daily after symptoms have begun to improve.              -Afrin is a nasal spray that can give immediate relief of nasal congestion but you cannot use this medication for more than 3 days              -Simple foods like chicken noodle soup.              - Mucinex for congestion or Mucinex DM for cough during the day time. Delsym helps with coughing at night. Mucinex-D if you have sinus pressure/sinus pain or chest congestion. (caution if history of high blood pressure or palpitations). You must increase your water intake when using expectorants (Mucinex).             -Zyrtec/Claritin/Allegra/Xyzal should help with allergies.  -If you DO NOT have Hypertension or any history of palpitations, it is ok to take over the counter Sudafed or Mucinex D or Allegra-D or Claritin-D or Zyrtec-D.  -If you do take one of the above, it is ok to combine that with plain over the counter Mucinex or Allegra or Claritin or Zyrtec. If, for example, you are taking Zyrtec -D, you can combine that with Mucinex, but not Mucinex-D.  If you are taking Mucinex-D, you can combine that with plain Allegra or Claritin or Zyrtec.   -If you DO have Hypertension or palpitations, it is safe to take Coricidin HBP for  relief of sinus symptoms.     Your test was POSITIVE for COVID-19 (coronavirus).       Please isolate yourself at home.  You may leave home and/or return to work once the following conditions are met:    If you were not hospitalized and are not moderately to severely immunocompromised:   More than 5 days since symptoms first appeared AND  More than 24 hours fever free without medications AND  Symptoms are improving  Continue to wear a mask around others for 5 additional days.    If you were hospitalized OR are moderately to severely immunocompromised:  More than 20 days since symptoms first appeared  More than 24 hours fever free without medications  Symptoms have improved    If you had no symptoms but tested positive:  More than 5 days since the date of the first positive test (20 days if moderately to severely immunocompromised). If you develop symptoms, then use the guidelines above.  Continue to wear a mask around others for 5 additional days.      Contact Tracing    As one of the next steps, you will receive a call or text from the [place] [facility] ([facility]) COVID-19 Tracing Team. See the contact information below so you know not to ignore the health departments call. It is important that you contact them back immediately so they can help.      Contact Tracer Number:  [phone]  Caller ID for most carriers: [facility]     What is contact tracing?  Contact tracing is a process that helps identify everyone who has been in close contact with an infected person. Contact tracers let those people know they may have been exposed and guide them on next steps. Confidentiality is important for everyone; no one will be told who may have exposed them to the virus.  Your involvement is important. The more we know about where and how this virus is spreading, the better chance we have at stopping it from spreading further.  What does exposure mean?  Exposure means you have been within 6 feet for more than  15 minutes with a person who has or had COVID-19.  What kind of questions do the contact tracers ask?  A contact tracer will confirm your basic contact information including name, address, phone number, and next of kin, as well as asking about any symptoms you may have had. Theyll also ask you how you think you may have gotten sick, such as places where you may have been exposed to the virus, and people you were with. Those names will never be shared with anyone outside of that call, and will only be used to help trace and stop the spread of the virus.   I have privacy concerns. How will the state use my information?  Your privacy about your health is important. All calls are completed using call centers that use the appropriate health privacy protection measures (HIPAA compliance), meaning that your patient information is safe. No one will ever ask you any questions related to immigration status. Your health comes first.   Do I have to participate?  You do not have to participate, but we strongly encourage you to. Contact tracing can help us catch and control new outbreaks as theyre developing to keep your friends and family safe.   What if I dont hear from anyone?  If you dont receive a call within 24 hours, you can call the number above right away to inquire about your status. That line is open from 8:00 am - 8:00 p.m., 7 days a week.  Contact tracing saves lives! Together, we have the power to beat this virus and keep our loved ones and neighbors safe.    For more information see CDC link below.      https://www.cdc.gov/coronavirus/2019-ncov/hcp/guidance-prevent-spread.html#precautions        Sources:  CDC, [place] [facility]         Seek immediate care in the emergency room in the event of severe abdominal pain, chest pain, respiratory distress, fever unresponsive to antipyretic, dehydration, loss of consciousness, seizure.

## 2023-05-30 ENCOUNTER — OFFICE VISIT (OUTPATIENT)
Dept: URGENT CARE | Facility: CLINIC | Age: 73
End: 2023-05-30
Payer: MEDICARE

## 2023-05-30 VITALS
OXYGEN SATURATION: 95 % | HEART RATE: 89 BPM | SYSTOLIC BLOOD PRESSURE: 122 MMHG | DIASTOLIC BLOOD PRESSURE: 86 MMHG | TEMPERATURE: 98 F | RESPIRATION RATE: 19 BRPM

## 2023-05-30 DIAGNOSIS — J06.9 VIRAL URI WITH COUGH: ICD-10-CM

## 2023-05-30 DIAGNOSIS — R05.9 COUGH, UNSPECIFIED TYPE: Primary | ICD-10-CM

## 2023-05-30 LAB
CTP QC/QA: YES
CTP QC/QA: YES
POC MOLECULAR INFLUENZA A AGN: NEGATIVE
POC MOLECULAR INFLUENZA B AGN: NEGATIVE
SARS-COV-2 AG RESP QL IA.RAPID: NEGATIVE

## 2023-05-30 PROCEDURE — 99214 PR OFFICE/OUTPT VISIT, EST, LEVL IV, 30-39 MIN: ICD-10-PCS | Mod: S$GLB,,, | Performed by: FAMILY MEDICINE

## 2023-05-30 PROCEDURE — 87811 SARS CORONAVIRUS 2 ANTIGEN POCT, MANUAL READ: ICD-10-PCS | Mod: QW,S$GLB,, | Performed by: FAMILY MEDICINE

## 2023-05-30 PROCEDURE — 87811 SARS-COV-2 COVID19 W/OPTIC: CPT | Mod: QW,S$GLB,, | Performed by: FAMILY MEDICINE

## 2023-05-30 PROCEDURE — 99214 OFFICE O/P EST MOD 30 MIN: CPT | Mod: S$GLB,,, | Performed by: FAMILY MEDICINE

## 2023-05-30 PROCEDURE — 87502 INFLUENZA DNA AMP PROBE: CPT | Mod: QW,S$GLB,, | Performed by: FAMILY MEDICINE

## 2023-05-30 PROCEDURE — 87502 POCT INFLUENZA A/B MOLECULAR: ICD-10-PCS | Mod: QW,S$GLB,, | Performed by: FAMILY MEDICINE

## 2023-05-30 RX ORDER — FLUTICASONE FUROATE AND VILANTEROL TRIFENATATE 100; 25 UG/1; UG/1
1 POWDER RESPIRATORY (INHALATION) DAILY
Qty: 180 EACH | Refills: 3 | Status: SHIPPED | OUTPATIENT
Start: 2023-05-30

## 2023-05-30 RX ORDER — PREDNISONE 20 MG/1
20 TABLET ORAL DAILY
Qty: 5 TABLET | Refills: 0 | Status: SHIPPED | OUTPATIENT
Start: 2023-05-30 | End: 2023-06-04

## 2023-05-30 RX ORDER — BENZONATATE 200 MG/1
200 CAPSULE ORAL 3 TIMES DAILY PRN
Qty: 30 CAPSULE | Refills: 0 | Status: SHIPPED | OUTPATIENT
Start: 2023-05-30 | End: 2023-06-09

## 2023-05-30 NOTE — PROGRESS NOTES
Subjective:      Patient ID: Concha Hess is a 72 y.o. female.    Vitals:  temperature is 98.2 °F (36.8 °C). Her blood pressure is 122/86 and her pulse is 89. Her respiration is 19 and oxygen saturation is 95%.     Chief Complaint: Cough    Pt is a 71 yo F with COPD who presents with cough and nasal congestion since Saturday.  She started running fever and having chills on Sunday.  She had a fever this AM which responded to ibuprofen.  She denies shortness of breath and reports that this is not a COPD exacerbation.  She is taking Mucinex DM and uses flonase.  Cough is mostly dry with occasional sputum production    Cough  This is a new problem. The current episode started in the past 7 days (Saturday). The problem has been gradually worsening. The problem occurs constantly. The cough is Productive of sputum. Associated symptoms include chills, a fever, nasal congestion and postnasal drip. Pertinent negatives include no chest pain, ear congestion, ear pain, headaches, heartburn, hemoptysis, myalgias, rash, rhinorrhea, sore throat, sweats, weight loss or wheezing. Nothing aggravates the symptoms. She has tried OTC cough suppressant for the symptoms. The treatment provided mild relief. Her past medical history is significant for asthma and COPD. There is no history of bronchiectasis, bronchitis, emphysema, environmental allergies or pneumonia.     Constitution: Positive for chills and fever.   HENT:  Positive for postnasal drip. Negative for ear pain and sore throat.    Cardiovascular:  Negative for chest pain.   Respiratory:  Positive for cough. Negative for bloody sputum and wheezing.    Gastrointestinal:  Negative for heartburn.   Musculoskeletal:  Negative for muscle ache.   Skin:  Negative for rash.   Allergic/Immunologic: Negative for environmental allergies.   Neurological:  Negative for headaches.    Objective:     Physical Exam   Constitutional: She is oriented to person, place, and time. She appears  well-developed. She is cooperative.  Non-toxic appearance. She does not appear ill. No distress.   HENT:   Head: Normocephalic and atraumatic.   Ears:   Right Ear: Hearing, tympanic membrane, external ear and ear canal normal.   Left Ear: Hearing, tympanic membrane, external ear and ear canal normal.   Nose: Nose normal. No mucosal edema, rhinorrhea or nasal deformity. No epistaxis. Right sinus exhibits no maxillary sinus tenderness and no frontal sinus tenderness. Left sinus exhibits no maxillary sinus tenderness and no frontal sinus tenderness.   Mouth/Throat: Uvula is midline, oropharynx is clear and moist and mucous membranes are normal. No trismus in the jaw. Normal dentition. No uvula swelling. No oropharyngeal exudate, posterior oropharyngeal edema or posterior oropharyngeal erythema.   Eyes: Conjunctivae and lids are normal. No scleral icterus.   Neck: Trachea normal and phonation normal. Neck supple. No edema present. No erythema present. No neck rigidity present.   Cardiovascular: Normal rate, regular rhythm, normal heart sounds and normal pulses.   Pulmonary/Chest: Effort normal. No respiratory distress. She has no decreased breath sounds. She has wheezes in the right middle field and the left middle field. She has no rhonchi.   Abdominal: Normal appearance.   Musculoskeletal: Normal range of motion.         General: No deformity. Normal range of motion.   Neurological: She is alert and oriented to person, place, and time. She exhibits normal muscle tone. Coordination normal.   Skin: Skin is warm, dry, intact, not diaphoretic and not pale.   Psychiatric: Her speech is normal and behavior is normal. Judgment and thought content normal.   Nursing note and vitals reviewed.  Results for orders placed or performed in visit on 05/30/23   SARS Coronavirus 2 Antigen, POCT Manual Read   Result Value Ref Range    SARS Coronavirus 2 Antigen Negative Negative     Acceptable Yes    POCT Influenza A/B  MOLECULAR   Result Value Ref Range    POC Molecular Influenza A Ag Negative Negative, Not Reported    POC Molecular Influenza B Ag Negative Negative, Not Reported     Acceptable Yes        Assessment:     1. Cough, unspecified type    2. Viral URI with cough        Plan:       Cough, unspecified type  -     SARS Coronavirus 2 Antigen, POCT Manual Read  -     POCT Influenza A/B MOLECULAR    Viral URI with cough  -     predniSONE (DELTASONE) 20 MG tablet; Take 1 tablet (20 mg total) by mouth once daily. for 5 days  Dispense: 5 tablet; Refill: 0  -     benzonatate (TESSALON) 200 MG capsule; Take 1 capsule (200 mg total) by mouth 3 (three) times daily as needed for Cough.  Dispense: 30 capsule; Refill: 0    Other orders  -     BREO ELLIPTA 100-25 mcg/dose diskus inhaler; Inhale 1 puff into the lungs once daily. Controller  Dispense: 180 each; Refill: 3        PAtient requested refill of her Breo Ellipta.  Will give a short course of prednisone due to mild wheezing.    Patient Instructions   Rest  Fluids  Warm tea with honey and lemon  Warm salt water gargles  Warm sinus compresses  mucinex (guaifenesin)  Nasal saline  Tylenol or advil for pain or fever  IF no improvement or worsening, return for re-evaluation       You must understand that you have received treatment at an Urgent Care facility only, and that you may be  released before all of your medical problems are known or treated. Urgent Care facilities are not equipped to  handle life threatening emergencies. It is recommended that you seek care at an Emergency Department for  further evaluation of worsening or concerning symptoms, or possibly life threatening conditions as  discussed.      If you develop chest pain, shortness of breath, throat swelling, tongue swelling, lightheadedness or any other causes for concern, proceed to ER.

## 2023-05-30 NOTE — PATIENT INSTRUCTIONS
Rest  Fluids  Warm tea with honey and lemon  Warm salt water gargles  Warm sinus compresses  mucinex (guaifenesin)  Nasal saline  Tylenol or advil for pain or fever  IF no improvement or worsening, return for re-evaluation       You must understand that you have received treatment at an Urgent Care facility only, and that you may be  released before all of your medical problems are known or treated. Urgent Care facilities are not equipped to  handle life threatening emergencies. It is recommended that you seek care at an Emergency Department for  further evaluation of worsening or concerning symptoms, or possibly life threatening conditions as  discussed.      If you develop chest pain, shortness of breath, throat swelling, tongue swelling, lightheadedness or any other causes for concern, proceed to ER.

## 2023-06-01 NOTE — PROGRESS NOTES
Subjective:      Patient ID: Concha Hess is a 69 y.o. female.    Chief Complaint: Abnormal Ct Scan    HPI  68 yo female comes for follow up on a 4 mm nodule in the right middle lobe. She has mild persistent asthma and doing well on daily BREO, She fell over the  in  trying to hang her flag and suffered a concussion and fx arm. Doing well at this time. No more ladders.     CT today shows no change in the size of the 4 mm RML nodule.   Peak flow 375 l/min      No flowsheet data found.  Review of Systems   Constitutional: Negative.    HENT: Negative.    Eyes: Negative.    Respiratory: Negative.         Asymptomatic 4 mm nodule stable RML     Elevated left chio diaphragm    Brother in law and sister in law have lung cancer   Cardiovascular: Negative.    Genitourinary: Negative.    Musculoskeletal: Negative.    Skin: Negative.    Gastrointestinal: Negative.         Sister recently  from colon cancer   Neurological: Negative.         Hx of a concussion after a fall from a ladder.    Psychiatric/Behavioral: Negative.      Objective:     Physical Exam  Vitals signs and nursing note reviewed.   Constitutional:       General: She is not in acute distress.     Appearance: She is well-developed.   HENT:      Head: Normocephalic and atraumatic.      Right Ear: External ear normal.      Left Ear: External ear normal.      Nose: Nose normal.   Eyes:      Conjunctiva/sclera: Conjunctivae normal.      Pupils: Pupils are equal, round, and reactive to light.   Neck:      Musculoskeletal: Normal range of motion and neck supple.      Thyroid: No thyromegaly.      Vascular: No JVD.   Cardiovascular:      Rate and Rhythm: Normal rate and regular rhythm.      Heart sounds: Normal heart sounds. No murmur. No gallop.    Pulmonary:      Effort: No respiratory distress.      Breath sounds: Normal breath sounds. No stridor. No wheezing or rales.      Comments: C lear without wheezes or rales    Peak  Flow  375 l.min   Chest:      Chest wall: No tenderness.   Abdominal:      General: Bowel sounds are normal. There is no distension.      Palpations: Abdomen is soft. There is no mass.      Tenderness: There is no abdominal tenderness. There is no guarding or rebound.   Musculoskeletal: Normal range of motion.   Lymphadenopathy:      Cervical: No cervical adenopathy.   Skin:     General: Skin is warm and dry.      Findings: No rash.   Neurological:      Mental Status: She is alert and oriented to person, place, and time.      Cranial Nerves: No cranial nerve deficit.      Deep Tendon Reflexes: Reflexes are normal and symmetric. Reflexes normal.   Psychiatric:         Behavior: Behavior normal.         Thought Content: Thought content normal.         Judgment: Judgment normal.         Assessment:     No diagnosis found.  Outpatient Encounter Medications as of 7/8/2020   Medication Sig Dispense Refill    albuterol (PROVENTIL/VENTOLIN HFA) 90 mcg/actuation inhaler INHALE 2 PUFFS BY MOUTH EVERY 4 HOURS AS NEEDED FOR WHEEZING OR SHORTNESS OF BREATH 18 g 5    b complex vitamins (B-COMPLEX) tablet Take 1 tablet by mouth once daily.      BREO ELLIPTA 100-25 mcg/dose diskus inhaler INHALE 1 PUFF INTO THE LUNGS EVERY DAY 60 each 5    BREO ELLIPTA 200-25 mcg/dose DsDv diskus inhaler INHALE 1 PUFF BY MOUTH EVERY DAY 1 each 11    calcium carbonate-vit D3-min 600 mg calcium- 400 unit Tab Take 1 tablet by mouth 2 (two) times daily. 180 tablet 3    celecoxib (CELEBREX) 200 MG capsule TAKE 1 CAPSULE(200 MG) BY MOUTH TWICE DAILY 180 capsule 2    cetirizine 10 mg chewable tablet Take 10 mg by mouth once daily.      clorazepate (TRANXENE) 7.5 MG Tab TAKE 1 TABLET(7.5 MG) BY MOUTH TWICE DAILY 60 tablet 5    escitalopram oxalate (LEXAPRO) 10 MG tablet TAKE 1 TABLET(10 MG) BY MOUTH EVERY DAY 90 tablet 3    esomeprazole (NEXIUM) 20 MG capsule Take 20 mg by mouth before breakfast.      estradiol (ESTRACE) 0.01 % (0.1 mg/gram) vaginal  cream INSERT 1/2 GRAM VAGINALLY AT NIGHT FOR 2 WEEKS THEN TWICE A WEEK 42.5 g 5    fluticasone (FLONASE) 50 mcg/actuation nasal spray 2 sprays (100 mcg total) by Each Nare route once daily. 1 Bottle 0    MULTIVITS-MIN/IRON/FA/LUTEIN (ULTIMATE WOMEN'S COMPLETE 50+ ORAL) Take by mouth.      ondansetron (ZOFRAN) 4 MG tablet Take 1 tablet (4 mg total) by mouth every 8 (eight) hours as needed for Nausea. 12 tablet 0     No facility-administered encounter medications on file as of 7/8/2020.        Plan:   IMP: Mild persistent asthma under good control with BREO  And Stable 4 mm nodule in the Right Middle Lobe. Unchanged from September, 2018  Problem List Items Addressed This Visit     None           Hydroxyzine Pregnancy And Lactation Text: This medication is not safe during pregnancy and should not be taken. It is also excreted in breast milk and breast feeding isn't recommended.

## 2023-06-30 ENCOUNTER — PES CALL (OUTPATIENT)
Dept: ADMINISTRATIVE | Facility: CLINIC | Age: 73
End: 2023-06-30
Payer: MEDICARE

## 2023-07-14 DIAGNOSIS — F41.9 ANXIETY: ICD-10-CM

## 2023-07-14 RX ORDER — CLORAZEPATE DIPOTASSIUM 7.5 MG/1
TABLET ORAL
Qty: 60 TABLET | Refills: 0 | Status: SHIPPED | OUTPATIENT
Start: 2023-07-14 | End: 2023-07-23 | Stop reason: SDUPTHER

## 2023-07-14 NOTE — TELEPHONE ENCOUNTER
Care Due:                  Date            Visit Type   Department     Provider  --------------------------------------------------------------------------------                                MYCHART                              FOLLOWUP/OF  [facility]  Last Visit: 06-      [facility] VISIT   CARE           John Vinson  Next Visit: None Scheduled  None         None Found                                                            Last  Test          Frequency    Reason                     Performed    Due Date  --------------------------------------------------------------------------------    Office Visit  12 months..  EScitalopram, albuterol..  06- 05-    [facility] Embedded Care Due Messages. Reference number: [id number].   7/14/2023 1:28:46 PM CDT

## 2023-07-20 DIAGNOSIS — F41.9 ANXIETY: ICD-10-CM

## 2023-07-20 DIAGNOSIS — F32.A DEPRESSION, UNSPECIFIED DEPRESSION TYPE: ICD-10-CM

## 2023-07-20 RX ORDER — CLORAZEPATE DIPOTASSIUM 7.5 MG/1
TABLET ORAL
Qty: 60 TABLET | Refills: 0 | OUTPATIENT
Start: 2023-07-20

## 2023-07-20 RX ORDER — ESCITALOPRAM OXALATE 10 MG/1
10 TABLET ORAL DAILY
Qty: 90 TABLET | Refills: 0 | Status: SHIPPED | OUTPATIENT
Start: 2023-07-20 | End: 2023-10-03

## 2023-07-20 NOTE — TELEPHONE ENCOUNTER
No care due was identified.  [facility] Embedded Care Due Messages. Reference number: [id number].   7/20/2023 1:32:43 PM CDT

## 2023-07-23 DIAGNOSIS — F41.9 ANXIETY: ICD-10-CM

## 2023-07-23 NOTE — TELEPHONE ENCOUNTER
No care due was identified.  [facility] Embedded Care Due Messages. Reference number: [id number].   7/23/2023 2:19:13 PM CDT

## 2023-07-24 RX ORDER — CLORAZEPATE DIPOTASSIUM 7.5 MG/1
7.5 TABLET ORAL 2 TIMES DAILY PRN
Qty: 60 TABLET | Refills: 3 | Status: SHIPPED | OUTPATIENT
Start: 2023-07-24 | End: 2023-09-26 | Stop reason: SDUPTHER

## 2023-09-05 ENCOUNTER — TELEPHONE (OUTPATIENT)
Dept: UROLOGY | Facility: CLINIC | Age: 73
End: 2023-09-05
Payer: MEDICARE

## 2023-09-07 ENCOUNTER — TELEPHONE (OUTPATIENT)
Dept: UROLOGY | Facility: CLINIC | Age: 73
End: 2023-09-07
Payer: MEDICARE

## 2023-09-07 NOTE — TELEPHONE ENCOUNTER
Patient reports sensation of pressure and frequency for several months. Would like a urogyn f/u- previous patient of Dr. Parra, but would like to be seen ASAP.

## 2023-09-11 ENCOUNTER — OFFICE VISIT (OUTPATIENT)
Dept: URGENT CARE | Facility: CLINIC | Age: 73
End: 2023-09-11
Payer: MEDICARE

## 2023-09-11 VITALS
DIASTOLIC BLOOD PRESSURE: 71 MMHG | WEIGHT: 147.94 LBS | SYSTOLIC BLOOD PRESSURE: 131 MMHG | TEMPERATURE: 98 F | OXYGEN SATURATION: 99 % | HEART RATE: 87 BPM | RESPIRATION RATE: 16 BRPM | HEIGHT: 62 IN | BODY MASS INDEX: 27.22 KG/M2

## 2023-09-11 DIAGNOSIS — N30.00 ACUTE CYSTITIS WITHOUT HEMATURIA: Primary | ICD-10-CM

## 2023-09-11 DIAGNOSIS — R30.0 DYSURIA: ICD-10-CM

## 2023-09-11 LAB
BILIRUB UR QL STRIP: NEGATIVE
GLUCOSE UR QL STRIP: NEGATIVE
KETONES UR QL STRIP: NEGATIVE
LEUKOCYTE ESTERASE UR QL STRIP: NEGATIVE
PH, POC UA: 5.5 (ref 5–8)
POC BLOOD, URINE: NEGATIVE
POC NITRATES, URINE: NEGATIVE
PROT UR QL STRIP: NEGATIVE
SP GR UR STRIP: 1.01 (ref 1–1.03)
UROBILINOGEN UR STRIP-ACNC: NORMAL (ref 0.1–1.1)

## 2023-09-11 PROCEDURE — 81003 POCT URINALYSIS, DIPSTICK, AUTOMATED, W/O SCOPE: ICD-10-PCS | Mod: QW,S$GLB,, | Performed by: NURSE PRACTITIONER

## 2023-09-11 PROCEDURE — 99213 OFFICE O/P EST LOW 20 MIN: CPT | Mod: S$GLB,,, | Performed by: NURSE PRACTITIONER

## 2023-09-11 PROCEDURE — 87086 URINE CULTURE/COLONY COUNT: CPT | Mod: HCNC | Performed by: NURSE PRACTITIONER

## 2023-09-11 PROCEDURE — 99213 PR OFFICE/OUTPT VISIT, EST, LEVL III, 20-29 MIN: ICD-10-PCS | Mod: S$GLB,,, | Performed by: NURSE PRACTITIONER

## 2023-09-11 PROCEDURE — 81003 URINALYSIS AUTO W/O SCOPE: CPT | Mod: QW,S$GLB,, | Performed by: NURSE PRACTITIONER

## 2023-09-11 RX ORDER — NITROFURANTOIN 25; 75 MG/1; MG/1
100 CAPSULE ORAL 2 TIMES DAILY
Qty: 14 CAPSULE | Refills: 0 | Status: SHIPPED | OUTPATIENT
Start: 2023-09-11 | End: 2023-09-18

## 2023-09-11 RX ORDER — AMOXICILLIN 500 MG/1
TABLET, FILM COATED ORAL
COMMUNITY
Start: 2023-08-09

## 2023-09-11 NOTE — PROGRESS NOTES
"Subjective:      Patient ID: Concha Hess is a 72 y.o. female.    Vitals:  height is 5' 2" (1.575 m) and weight is 67.1 kg (147 lb 14.9 oz). Her oral temperature is 98 °F (36.7 °C). Her blood pressure is 131/71 and her pulse is 87. Her respiration is 16 and oxygen saturation is 99%.     Chief Complaint: Dysuria    71yo female pt reports onset of dysuria, suprapubic pressure, incomplete emptying, decreased urine output, frequency, and urgency over the past 2 days.  Reports recurrent UTI symptoms over the past 2-3 months, and states that she missed her appt with urologist on 9/7 to discuss symptoms.  Denies fever/chills, denies n/v/d, denies abd pain.  Denies back/flank pain.  Denies any changes to urine appearance.    Dysuria   This is a recurrent problem. The current episode started in the past 7 days. The problem occurs intermittently. The problem has been gradually worsening. The quality of the pain is described as burning and aching. The pain is at a severity of 6/10. The pain is moderate. She is Sexually active. There is No history of pyelonephritis. Associated symptoms include frequency and urgency. Pertinent negatives include no behavior changes, chills, discharge, flank pain, hematuria, hesitancy, nausea, possible pregnancy, sweats, vomiting, weight loss, bubble bath use, constipation, rash or withholding. Associated symptoms comments: Lower back pain. She has tried home medications, antibiotics and increased fluids for the symptoms. The treatment provided no relief. Her past medical history is significant for catheterization, hypertension, recurrent UTIs and a urological procedure. There is no history of diabetes insipidus, diabetes mellitus, genitourinary reflux, kidney stones, a single kidney, STD or urinary stasis.       Constitution: Negative for chills and fever.   Gastrointestinal:  Negative for abdominal pain, nausea, vomiting and constipation.   Genitourinary:  Positive for dysuria, frequency, " urgency and urine decreased. Negative for flank pain, bed wetting, hematuria, history of kidney stones, vaginal pain, vaginal discharge, vaginal bleeding, vaginal odor, painful intercourse, genital sore and pelvic pain.   Musculoskeletal:  Negative for back pain.   Skin:  Negative for rash.      Objective:     Physical Exam   Constitutional: She is oriented to person, place, and time. She appears well-developed.   HENT:   Head: Normocephalic and atraumatic.   Ears:   Right Ear: External ear normal.   Left Ear: External ear normal.   Nose: Nose normal.   Mouth/Throat: Mucous membranes are normal.   Eyes: Conjunctivae and lids are normal.   Neck: Trachea normal. Neck supple.   Cardiovascular: Normal rate, regular rhythm and normal heart sounds.   Pulmonary/Chest: Effort normal and breath sounds normal. No respiratory distress.   Abdominal: Normal appearance and bowel sounds are normal. She exhibits no distension and no mass. Soft. There is no abdominal tenderness.   Musculoskeletal: Normal range of motion.         General: Normal range of motion.   Neurological: She is alert and oriented to person, place, and time. She has normal strength.   Skin: Skin is warm, dry, intact, not diaphoretic and not pale.   Psychiatric: Her speech is normal and behavior is normal. Judgment and thought content normal.   Nursing note and vitals reviewed.      Results for orders placed or performed in visit on 09/11/23   POCT Urinalysis, Dipstick, Automated, W/O Scope   Result Value Ref Range    POC Blood, Urine Negative Negative    POC Bilirubin, Urine Negative Negative    POC Urobilinogen, Urine Normal 0.1 - 1.1    POC Ketones, Urine Negative Negative    POC Protein, Urine Negative Negative    POC Nitrates, Urine Negative Negative    POC Glucose, Urine Negative Negative    pH, UA 5.5 5 - 8    POC Specific Gravity, Urine 1.010 1.003 - 1.029    POC Leukocytes, Urine Negative Negative         Assessment:     1. Acute cystitis without  hematuria    2. Dysuria        Plan:     Provided education on prescribed medications, recommended increasing fluid intake for additional symptom relief.  Recommended follow-up appt with Urology, pt reports that she already has appt scheduled for follow-up.  Will call with urine culture results.  Provided education on return/ER precautions.  Pt verbalized understanding and agreed to plan.      Acute cystitis without hematuria  -     nitrofurantoin, macrocrystal-monohydrate, (MACROBID) 100 MG capsule; Take 1 capsule (100 mg total) by mouth 2 (two) times daily. for 7 days  Dispense: 14 capsule; Refill: 0    Dysuria  -     POCT Urinalysis, Dipstick, Automated, W/O Scope  -     Urine culture      Patient Instructions   If your condition worsens or fails to improve, we recommend that you receive another evaluation at the ER immediately, contact your PCP to discuss your concerns, or return here.  You must understand that you've received an urgent care treatment only, and that you may be released before all your medical problems are known or treated.  You, the patient, will arrange for follow-up care as instructed.     If you had cultures done, it will take 3-5 business days to result.  We will call you with the result.     If you are are female and on birth control pills, use additional methods to prevent pregnancy while on antibiotics and for one cycle after.     Cranberry juice may help.  Get the 100% cranberry juice, mix 4 oz. of juice with 4 oz. of water, and drink this 8 oz. glass of liquid once a day.  Increase water intake to at least 8-10 glasses/day.    Do not wear contacts with Pyridium as it will stain them.  You may want to wear a panty liner with Pyridium as it may stain your underwear.    Avoid caffeine, alcohol, or spicy foods as they irritate the bladder.

## 2023-09-12 ENCOUNTER — TELEPHONE (OUTPATIENT)
Dept: URGENT CARE | Facility: CLINIC | Age: 73
End: 2023-09-12
Payer: MEDICARE

## 2023-09-12 LAB — BACTERIA UR CULT: NORMAL

## 2023-09-13 ENCOUNTER — TELEPHONE (OUTPATIENT)
Dept: URGENT CARE | Facility: CLINIC | Age: 73
End: 2023-09-13
Payer: MEDICARE

## 2023-09-13 NOTE — TELEPHONE ENCOUNTER
2nd attemp. Called patient to give negative urine culture results from previous visit 09/11/2023.  Seen by nurse practitioner Angy sales.  Treated with Macrobid at that time for UTI.  Urine culture negative.  Has already seen her results via [employer].  Did not answer the phone.  Left voicemail for her to contact clinic if she has any questions or concerns.    Results for orders placed or performed in visit on 09/11/23   Urine culture    Specimen: Urine, Clean Catch   Result Value Ref Range    Urine Culture, Routine No significant growth    POCT Urinalysis, Dipstick, Automated, W/O Scope   Result Value Ref Range    POC Blood, Urine Negative Negative    POC Bilirubin, Urine Negative Negative    POC Urobilinogen, Urine Normal 0.1 - 1.1    POC Ketones, Urine Negative Negative    POC Protein, Urine Negative Negative    POC Nitrates, Urine Negative Negative    POC Glucose, Urine Negative Negative    pH, UA 5.5 5 - 8    POC Specific Gravity, Urine 1.010 1.003 - 1.029    POC Leukocytes, Urine Negative Negative

## 2023-09-15 ENCOUNTER — TELEPHONE (OUTPATIENT)
Dept: URGENT CARE | Facility: CLINIC | Age: 73
End: 2023-09-15
Payer: MEDICARE

## 2023-09-15 NOTE — TELEPHONE ENCOUNTER
Third attempt to contact patient regarding negative urine culture from previous visit on 09/11/2023. Seen by nurse practitioner Angy sales.  Treated with Macrobid at that time for UTI.  Urine culture negative.  Has already seen her results via [employer].  Did not answer the phone.  Left voicemail for her to contact clinic if she has any questions or concerns.    Results for orders placed or performed in visit on 09/11/23   Urine culture    Specimen: Urine, Clean Catch   Result Value Ref Range    Urine Culture, Routine No significant growth    POCT Urinalysis, Dipstick, Automated, W/O Scope   Result Value Ref Range    POC Blood, Urine Negative Negative    POC Bilirubin, Urine Negative Negative    POC Urobilinogen, Urine Normal 0.1 - 1.1    POC Ketones, Urine Negative Negative    POC Protein, Urine Negative Negative    POC Nitrates, Urine Negative Negative    POC Glucose, Urine Negative Negative    pH, UA 5.5 5 - 8    POC Specific Gravity, Urine 1.010 1.003 - 1.029    POC Leukocytes, Urine Negative Negative

## 2023-09-18 ENCOUNTER — PATIENT MESSAGE (OUTPATIENT)
Dept: PRIMARY CARE CLINIC | Facility: CLINIC | Age: 73
End: 2023-09-18
Payer: MEDICARE

## 2023-09-26 DIAGNOSIS — F41.9 ANXIETY: ICD-10-CM

## 2023-09-26 RX ORDER — CLORAZEPATE DIPOTASSIUM 7.5 MG/1
7.5 TABLET ORAL 2 TIMES DAILY PRN
Qty: 60 TABLET | Refills: 0 | Status: SHIPPED | OUTPATIENT
Start: 2023-09-26 | End: 2023-11-24

## 2023-09-26 NOTE — TELEPHONE ENCOUNTER
Care Due:                  Date            Visit Type   Department     Provider  --------------------------------------------------------------------------------                                MYCHART                              FOLLOWUP/OF  [facility]  Last Visit: 06-      [facility] VISIT   CARE           John Vinson  Next Visit: None Scheduled  None         None Found                                                            Last  Test          Frequency    Reason                     Performed    Due Date  --------------------------------------------------------------------------------    Office Visit  12 months..  EScitalopram, albuterol..  06- 05-    [facility] Embedded Care Due Messages. Reference number: [id number].   9/26/2023 1:37:12 PM CDT

## 2023-09-28 ENCOUNTER — OFFICE VISIT (OUTPATIENT)
Dept: UROGYNECOLOGY | Facility: CLINIC | Age: 73
End: 2023-09-28
Payer: MEDICARE

## 2023-09-28 VITALS
SYSTOLIC BLOOD PRESSURE: 140 MMHG | WEIGHT: 145 LBS | HEART RATE: 81 BPM | BODY MASS INDEX: 26.52 KG/M2 | DIASTOLIC BLOOD PRESSURE: 63 MMHG

## 2023-09-28 DIAGNOSIS — N39.3 STRESS INCONTINENCE: ICD-10-CM

## 2023-09-28 DIAGNOSIS — N39.41 URGE INCONTINENCE OF URINE: Primary | ICD-10-CM

## 2023-09-28 DIAGNOSIS — N95.2 VAGINAL ATROPHY: ICD-10-CM

## 2023-09-28 DIAGNOSIS — R33.9 INCOMPLETE BLADDER EMPTYING: ICD-10-CM

## 2023-09-28 DIAGNOSIS — R39.15 URGENCY OF URINATION: ICD-10-CM

## 2023-09-28 LAB
BILIRUB SERPL-MCNC: NORMAL MG/DL
BLOOD URINE, POC: NORMAL
CLARITY, POC UA: CLEAR
COLOR, POC UA: YELLOW
GLUCOSE UR QL STRIP: NORMAL
KETONES UR QL STRIP: NORMAL
LEUKOCYTE ESTERASE URINE, POC: NORMAL
NITRITE, POC UA: NORMAL
PH, POC UA: 7
POC RESIDUAL URINE VOLUME: 15 ML (ref 0–100)
PROTEIN, POC: NORMAL
SPECIFIC GRAVITY, POC UA: 1.01
UROBILINOGEN, POC UA: NORMAL

## 2023-09-28 PROCEDURE — 1159F PR MEDICATION LIST DOCUMENTED IN MEDICAL RECORD: ICD-10-PCS | Mod: HCNC,CPTII,S$GLB, | Performed by: OBSTETRICS & GYNECOLOGY

## 2023-09-28 PROCEDURE — 1160F RVW MEDS BY RX/DR IN RCRD: CPT | Mod: HCNC,CPTII,S$GLB, | Performed by: OBSTETRICS & GYNECOLOGY

## 2023-09-28 PROCEDURE — 99999 PR PBB SHADOW E&M-EST. PATIENT-LVL III: CPT | Mod: PBBFAC,HCNC,, | Performed by: OBSTETRICS & GYNECOLOGY

## 2023-09-28 PROCEDURE — 3008F PR BODY MASS INDEX (BMI) DOCUMENTED: ICD-10-PCS | Mod: HCNC,CPTII,S$GLB, | Performed by: OBSTETRICS & GYNECOLOGY

## 2023-09-28 PROCEDURE — 99999 PR PBB SHADOW E&M-EST. PATIENT-LVL III: ICD-10-PCS | Mod: PBBFAC,HCNC,, | Performed by: OBSTETRICS & GYNECOLOGY

## 2023-09-28 PROCEDURE — 1160F PR REVIEW ALL MEDS BY PRESCRIBER/CLIN PHARMACIST DOCUMENTED: ICD-10-PCS | Mod: HCNC,CPTII,S$GLB, | Performed by: OBSTETRICS & GYNECOLOGY

## 2023-09-28 PROCEDURE — 81002 URINALYSIS NONAUTO W/O SCOPE: CPT | Mod: HCNC,S$GLB,, | Performed by: OBSTETRICS & GYNECOLOGY

## 2023-09-28 PROCEDURE — 1101F PT FALLS ASSESS-DOCD LE1/YR: CPT | Mod: HCNC,CPTII,S$GLB, | Performed by: OBSTETRICS & GYNECOLOGY

## 2023-09-28 PROCEDURE — 3288F FALL RISK ASSESSMENT DOCD: CPT | Mod: HCNC,CPTII,S$GLB, | Performed by: OBSTETRICS & GYNECOLOGY

## 2023-09-28 PROCEDURE — 51798 POCT BLADDER SCAN: ICD-10-PCS | Mod: HCNC,S$GLB,, | Performed by: OBSTETRICS & GYNECOLOGY

## 2023-09-28 PROCEDURE — 1126F PR PAIN SEVERITY QUANTIFIED, NO PAIN PRESENT: ICD-10-PCS | Mod: HCNC,CPTII,S$GLB, | Performed by: OBSTETRICS & GYNECOLOGY

## 2023-09-28 PROCEDURE — 3008F BODY MASS INDEX DOCD: CPT | Mod: HCNC,CPTII,S$GLB, | Performed by: OBSTETRICS & GYNECOLOGY

## 2023-09-28 PROCEDURE — 3077F PR MOST RECENT SYSTOLIC BLOOD PRESSURE >= 140 MM HG: ICD-10-PCS | Mod: HCNC,CPTII,S$GLB, | Performed by: OBSTETRICS & GYNECOLOGY

## 2023-09-28 PROCEDURE — 87563 M. GENITALIUM AMP PROBE: CPT | Mod: HCNC | Performed by: OBSTETRICS & GYNECOLOGY

## 2023-09-28 PROCEDURE — 81002 POCT URINE DIPSTICK WITHOUT MICROSCOPE: ICD-10-PCS | Mod: HCNC,S$GLB,, | Performed by: OBSTETRICS & GYNECOLOGY

## 2023-09-28 PROCEDURE — 1101F PR PT FALLS ASSESS DOC 0-1 FALLS W/OUT INJ PAST YR: ICD-10-PCS | Mod: HCNC,CPTII,S$GLB, | Performed by: OBSTETRICS & GYNECOLOGY

## 2023-09-28 PROCEDURE — 3077F SYST BP >= 140 MM HG: CPT | Mod: HCNC,CPTII,S$GLB, | Performed by: OBSTETRICS & GYNECOLOGY

## 2023-09-28 PROCEDURE — 1159F MED LIST DOCD IN RCRD: CPT | Mod: HCNC,CPTII,S$GLB, | Performed by: OBSTETRICS & GYNECOLOGY

## 2023-09-28 PROCEDURE — 3078F PR MOST RECENT DIASTOLIC BLOOD PRESSURE < 80 MM HG: ICD-10-PCS | Mod: HCNC,CPTII,S$GLB, | Performed by: OBSTETRICS & GYNECOLOGY

## 2023-09-28 PROCEDURE — 1126F AMNT PAIN NOTED NONE PRSNT: CPT | Mod: HCNC,CPTII,S$GLB, | Performed by: OBSTETRICS & GYNECOLOGY

## 2023-09-28 PROCEDURE — 87798 DETECT AGENT NOS DNA AMP: CPT | Mod: HCNC | Performed by: OBSTETRICS & GYNECOLOGY

## 2023-09-28 PROCEDURE — 99215 PR OFFICE/OUTPT VISIT, EST, LEVL V, 40-54 MIN: ICD-10-PCS | Mod: HCNC,S$GLB,, | Performed by: OBSTETRICS & GYNECOLOGY

## 2023-09-28 PROCEDURE — 99215 OFFICE O/P EST HI 40 MIN: CPT | Mod: HCNC,S$GLB,, | Performed by: OBSTETRICS & GYNECOLOGY

## 2023-09-28 PROCEDURE — 3288F PR FALLS RISK ASSESSMENT DOCUMENTED: ICD-10-PCS | Mod: HCNC,CPTII,S$GLB, | Performed by: OBSTETRICS & GYNECOLOGY

## 2023-09-28 PROCEDURE — 3078F DIAST BP <80 MM HG: CPT | Mod: HCNC,CPTII,S$GLB, | Performed by: OBSTETRICS & GYNECOLOGY

## 2023-09-28 PROCEDURE — 51798 US URINE CAPACITY MEASURE: CPT | Mod: HCNC,S$GLB,, | Performed by: OBSTETRICS & GYNECOLOGY

## 2023-09-28 PROCEDURE — 87086 URINE CULTURE/COLONY COUNT: CPT | Mod: HCNC | Performed by: OBSTETRICS & GYNECOLOGY

## 2023-09-28 RX ORDER — ESTRADIOL 0.1 MG/G
CREAM VAGINAL
Qty: 42.5 G | Refills: 3 | Status: SHIPPED | OUTPATIENT
Start: 2023-09-28

## 2023-09-28 NOTE — PROGRESS NOTES
"Chief Complaint   Patient presents with    Urinary Urgency        HPI: Patient is a 72 y.o. female  who presents today with c/o urinary urgency. She had a negative urine culture on 23.   Patient states symptoms started in July. She reports pressure in the lower abdomen and having an incomplete emptying sensation along with urinary urgency and post void dribbling. Also would notice some burning. Mostly symptoms were occurring in the evening. Went to urgent care and her urine culture was negative. She was treated with antibiotics, amoxicillin per review of records. Symptoms are a little better.     The pressure continues to be present when she stands up from the toilet and then needs to sit back down again.   Reports that she was also seen in Urgent Care 2023 for similar symptoms and was again treated with antibiotics. No urine culture is noted from that first time.     She notices that symptoms have increased in frequency since 2023. She states that she feels she is done voiding and then still feels an urge. Will take a few steps then has to void again. Sits back down and then only drips urine. She reports the first void is a continuous stream. Denies current dysuria. She voids about 3 times per day. She wakes once per night but before she falls asleep she has the persistent urge and returns to void. She reports urinary urgency and urgency urinary incontinence but this is not a new symptoms. Has been occurring for years. Not frequent occurrence. Has not tried any treatment. She also leaks urine with coughing, sneezing and laughing. She "lives with depends". The little drips do not bother her.     She cannot think of any new inciting causes. Not currently sexually active.     She reports seeing Dr. Parra in 2019. She was prescribed Oxybutynin but it causes severe dry mouth. She was later prescribed Myrbetriq which she doesn't know why she stopped. She was also prescribed vaginal estrogen and stopped " "due to "pure laziness". She was treated for a UTI.   She has 1 can of diet coke at lunch or in the evening. She drinks 7 bottles (16 oz) of water day.     Denies vaginal prolapse symptoms. Reports normal BM"s and denies accidental bowel leakage.     Review of Systems   Constitutional:  Negative for activity change, appetite change, chills, fatigue, fever and unexpected weight change.   HENT:  Negative for nasal congestion, dental problem, hearing loss, mouth dryness and sore throat.    Eyes:  Negative for pain and eye dryness.   Respiratory:  Positive for cough, shortness of breath and wheezing.         Per patient symptoms due to COPD   Cardiovascular:  Negative for chest pain, palpitations and leg swelling.   Gastrointestinal:  Negative for abdominal distention, abdominal pain, blood in stool, constipation and rectal pain.   Endocrine: Negative for cold intolerance and heat intolerance.   Genitourinary:  Negative for dyspareunia, hot flashes and vaginal dryness.   Musculoskeletal:  Negative for arthralgias, back pain, gait problem, joint swelling, myalgias, neck pain and neck stiffness.   Integumentary:  Negative for rash.   Neurological:  Negative for dizziness, tremors, seizures, speech difficulty, weakness, light-headedness, numbness and headaches.   Psychiatric/Behavioral:  Negative for confusion, dysphoric mood and sleep disturbance. The patient is not nervous/anxious.         Past Medical History:   Diagnosis Date    Acid reflux     Asthma     Chronic lung disease     Concussion wth loss of consciousness of 30 minutes or less 2019    COPD (chronic obstructive pulmonary disease)     Depression     Gastric ulcer     Hyperlipidemia     Paralysis of diaphragm     left    PONV (postoperative nausea and vomiting)        Past Surgical History:   Procedure Laterality Date    APPENDECTOMY      AUGMENTATION OF BREAST      BREAST SURGERY      AUGMENTATION     SECTION      COSMETIC SURGERY      FACE LIFT " breast reduction    HYSTEROSCOPY  2017       Current Outpatient Medications:     albuterol (PROVENTIL/VENTOLIN HFA) 90 mcg/actuation inhaler, Inhale 2 puffs into the lungs every 4 (four) hours as needed for Wheezing or Shortness of Breath. Rescue, Disp: 18 g, Rfl: 5    amoxicillin (AMOXIL) 500 MG Tab, Take by mouth., Disp: , Rfl:     b complex vitamins (B-COMPLEX) tablet, Take 1 tablet by mouth once daily., Disp: 90 tablet, Rfl: 1    BREO ELLIPTA 100-25 mcg/dose diskus inhaler, Inhale 1 puff into the lungs once daily. Controller, Disp: 180 each, Rfl: 3    calcium carbonate-vit D3-min 600 mg calcium- 400 unit Tab, Take 1 tablet by mouth 2 (two) times daily., Disp: 180 tablet, Rfl: 1    cetirizine (ZYRTEC) 10 MG tablet, Take 10 mg by mouth once daily., Disp: , Rfl:     clorazepate (TRANXENE) 7.5 MG Tab, Take 1 tablet (7.5 mg total) by mouth 2 (two) times daily as needed (anxiety)., Disp: 60 tablet, Rfl: 0    ergocalciferol (ERGOCALCIFEROL) 50,000 unit Cap, Take 50,000 Units by mouth every 7 days., Disp: , Rfl:     EScitalopram oxalate (LEXAPRO) 10 MG tablet, Take 1 tablet (10 mg total) by mouth once daily., Disp: 90 tablet, Rfl: 0    esomeprazole (NEXIUM) 20 MG capsule, Take 20 mg by mouth before breakfast., Disp: , Rfl:     MULTIVITS-MIN/IRON/FA/LUTEIN (ULTIMATE WOMEN'S COMPLETE 50+ ORAL), Take by mouth., Disp: , Rfl:     pneumococcal 23-kacie ps (PNEUMOVAX-23) 25 mcg/0.5 mL vaccine, Inject into the muscle., Disp: 0.5 mL, Rfl: 0    estradioL (ESTRACE) 0.01 % (0.1 mg/gram) vaginal cream, 0.5 grams with applicator or dime-sized amount with finger in vagina nightly x 2 weeks, then three times (Mon, Wed, Fri) a week thereafter, Disp: 42.5 g, Rfl: 3    mirabegron (MYRBETRIQ) 50 mg Tb24, Take 1 tablet (50 mg total) by mouth once daily., Disp: 90 tablet, Rfl: 0    progesterone (PROMETRIUM) 100 MG capsule, Take 1 capsule (100 mg total) by mouth nightly., Disp: 30 capsule, Rfl: 10    Review of patient's allergies indicates:    Allergen Reactions    Dilaudid [hydromorphone] Nausea And Vomiting    Morphine Nausea And Vomiting       Family History   Problem Relation Age of Onset    Cancer Mother         bone    Myasthenia gravis Mother     Heart disease Father     Colon cancer Sister         60 years    Cervical cancer Neg Hx     Endometrial cancer Neg Hx     Vaginal cancer Neg Hx     Breast cancer Neg Hx     Ovarian cancer Neg Hx        Social History     Socioeconomic History    Marital status:    Tobacco Use    Smoking status: Former     Current packs/day: 1.00     Average packs/day: 1 pack/day for 29.0 years (29.0 ttl pk-yrs)     Types: Cigarettes     Passive exposure: Past    Smokeless tobacco: Never   Substance and Sexual Activity    Alcohol use: Not Currently    Drug use: Yes     Comment: THC gummies at night    Sexual activity: Not Currently     Birth control/protection: Post-menopausal   Social History Narrative    Lives alone. Feels safe in her home.        OB History          3    Para   3    Term   0            AB        Living   2         SAB        IAB        Ectopic        Multiple        Live Births   3           Obstetric Comments   Middle child .                Gyn History    Mammogram: 9/10/21 BIRADS 1 negative  Pap smear: 22 negative  LMP: No LMP recorded (lmp unknown). Patient is postmenopausal.   Postmenopausal bleeding: denies      INITIAL PHYSICAL EXAMINATION    Vitals:    23 1307   BP: (!) 140/63   Pulse: 81      General: Healthy in appearance, Well nourished, Affect Normal, NAD.  Neck: Supple, No masses, Trachea midline, Thyroid normal size,  non-tender, no masses or nodules.  Nodes: No clavicular/cervical adenopathy.  Skin: Normal temperature, No atypical lesions or rash.  Heart: Normal sounds, no murmurs  Lungs: Normal respiratory effort.  Gastrointestinal: Non tender, Non distended, No masses, guarding or  rebound, No hepatosplenomegally, No hernia.  Ext: No clubbing,  cyanosis, edema or varicosities.  DTR's: 2+ bilaterally  Strength 5/5 bilateral upper and lower extremities    Genitourinary-  Vulva: normal without lesions, masses, atrophy or pain  Urethra: meatus central and normal in appearance, 2 mm caruncle, no masses or discharge. Empty supine stress test was negative.  Bladder: non-tender, no masses  Vagina: No discharge or lesions, severe atrophy, no masses appreciated.  [See POP-Q]  Cervix: no lesions, no discharge  Uterus:  non-tender, anteverted, approximately 4 weeks size  Adnexa: no masses, non tender.  Rectal: deferred   Neuro: S2-4- pin prick and light touch intact, Anal wink present  Levator strength: 1/5  Levator tenderness: left 0/10 and right 0/10    POP-Q Exam- pelvic organ prolapse quantitative    Aa -2  Anterior Wall Ba -2  Anterior wall C -6.5  Cervix or cuff   Gh 3.5  Genital hiatus pb 2.5  perineal body tvl 10.5  Total vaginal length   Ap -3  Posterior wall Bp -3  Posterior wall D -9.5  Posterior fornix     with Uterus    POP-Q Stage:1      Office Visit on 09/28/2023   Component Date Value Ref Range Status    Color, UA 09/28/2023 Yellow   Final    pH, UA 09/28/2023 7   Final    WBC, UA 09/28/2023 NEG   Final    Nitrite, UA 09/28/2023 NEG   Final    Protein, POC 09/28/2023 NEG   Final    Glucose, UA 09/28/2023 NORMAL   Final    Ketones, UA 09/28/2023 NEG   Final    Urobilinogen, UA 09/28/2023 NORMAL   Final    Bilirubin, POC 09/28/2023 NEG   Final    Blood, UA 09/28/2023 NEG   Final    Clarity, UA 09/28/2023 Clear   Final    Spec Grav UA 09/28/2023 1.010   Final    POC Residual Urine Volume 09/28/2023 15  0 - 100 mL Final        ASSESSMENT & PLAN:    Urge incontinence of urine  -     POCT URINE DIPSTICK WITHOUT MICROSCOPE  -     POCT Bladder Scan  -     mirabegron (MYRBETRIQ) 50 mg Tb24; Take 1 tablet (50 mg total) by mouth once daily.  Dispense: 90 tablet; Refill: 0  -     Ambulatory referral/consult to Physical/Occupational Therapy; Future; Expected date:  10/05/2023    Urgency of urination  -     Urine culture  -     Ureaplasma PCR Urine  -     Mycoplasma genitalium Molecular Detection, PCR Urine    Stress incontinence  -     Ambulatory referral/consult to Physical/Occupational Therapy; Future; Expected date: 10/05/2023    Vaginal atrophy  -     estradioL (ESTRACE) 0.01 % (0.1 mg/gram) vaginal cream; 0.5 grams with applicator or dime-sized amount with finger in vagina nightly x 2 weeks, then three times (Mon, Wed, Fri) a week thereafter  Dispense: 42.5 g; Refill: 3    Incomplete bladder emptying       Exam findings and treatment options were discussed in detail with the patient. The various etiologies of her pelvic/lower urinary tract symptoms were discussed and a presumptive diagnosis as above reviewed.     I have suggested that she start using vaginal estrogen given the small caruncle noted and the severe atrophy which could be contributing to symptoms. With her mixed incontinence also suggested that she try pelvic floor PT. Since she has tried Myrbetriq in the past suggested that we re-attempt this medication and then re-assess symptoms in 2 months. Will check her urine again with a culture and for ureaplasma and mycoplasma.   Further options will be considered if she is not significantly improved at the time of her follow up visit.    All questions were answered today. The patient was encouraged to contact the office as needed with any additional questions or concerns.     Total time spent on visit was 40 minutes.  This includes face to face time and non-face to face time preparing to see the patient (eg, review of tests), Obtaining and/or reviewing separately obtained history, Documenting clinical information in the electronic or other health record, Independently interpreting resultsand communicating results to the patient/family/caregiver, or Care coordination.    Ghislaine Abrams MD

## 2023-09-30 LAB
BACTERIA UR CULT: NORMAL
M GENITALIUM DNA UR QL NAA+PROBE: NEGATIVE
SPECIMEN SOURCE: NORMAL

## 2023-10-01 LAB
SPECIMEN SOURCE: NORMAL
U PARVUM DNA SPEC QL NAA+PROBE: NEGATIVE
U UREALYTICUM DNA SPEC QL NAA+PROBE: NEGATIVE

## 2023-10-03 ENCOUNTER — PATIENT MESSAGE (OUTPATIENT)
Dept: UROGYNECOLOGY | Facility: CLINIC | Age: 73
End: 2023-10-03
Payer: MEDICARE

## 2023-10-03 DIAGNOSIS — F32.A DEPRESSION, UNSPECIFIED DEPRESSION TYPE: ICD-10-CM

## 2023-10-03 RX ORDER — ESCITALOPRAM OXALATE 10 MG/1
10 TABLET ORAL
Qty: 90 TABLET | Refills: 0 | Status: SHIPPED | OUTPATIENT
Start: 2023-10-03 | End: 2024-03-18

## 2023-10-03 NOTE — TELEPHONE ENCOUNTER
No care due was identified.  Health [facility] Embedded Care Due Messages. Reference number: [id number].   10/03/2023 6:01:37 PM CDT

## 2023-10-03 NOTE — TELEPHONE ENCOUNTER
No care due was identified.  Health [facility] Embedded Care Due Messages. Reference number: [id number].   10/03/2023 2:02:41 PM CDT

## 2023-10-04 ENCOUNTER — PATIENT MESSAGE (OUTPATIENT)
Dept: PRIMARY CARE CLINIC | Facility: CLINIC | Age: 73
End: 2023-10-04
Payer: MEDICARE

## 2023-10-04 DIAGNOSIS — F32.A DEPRESSION, UNSPECIFIED DEPRESSION TYPE: ICD-10-CM

## 2023-10-04 RX ORDER — ESCITALOPRAM OXALATE 10 MG/1
10 TABLET ORAL
Qty: 90 TABLET | Refills: 0 | OUTPATIENT
Start: 2023-10-04

## 2023-10-04 NOTE — TELEPHONE ENCOUNTER
No care due was identified.  [facility] Embedded Care Due Messages. Reference number: [id number].   10/04/2023 2:47:24 PM CDT

## 2023-10-04 NOTE — TELEPHONE ENCOUNTER
Refill Decision Note   Concha Hess  is requesting a refill authorization.  Brief Assessment and Rationale for Refill:  Quick Discontinue     Medication Therapy Plan:  E-Prescribing Status: Receipt confirmed by pharmacy (10/3/2023  4:51 PM CDT)      Comments:     Note composed:4:05 PM 10/04/2023

## 2023-10-09 DIAGNOSIS — N39.41 URGE INCONTINENCE OF URINE: Primary | ICD-10-CM

## 2023-10-09 RX ORDER — SOLIFENACIN SUCCINATE 5 MG/1
5 TABLET, FILM COATED ORAL DAILY
Qty: 90 TABLET | Refills: 0 | Status: SHIPPED | OUTPATIENT
Start: 2023-10-09 | End: 2024-01-07

## 2023-10-18 ENCOUNTER — PATIENT MESSAGE (OUTPATIENT)
Dept: CARDIOLOGY | Facility: CLINIC | Age: 73
End: 2023-10-18
Payer: MEDICARE

## 2023-10-23 ENCOUNTER — TELEPHONE (OUTPATIENT)
Dept: ADMINISTRATIVE | Facility: CLINIC | Age: 73
End: 2023-10-23
Payer: MEDICARE

## 2023-10-24 NOTE — PROGRESS NOTES
[facility] Note    Chief Complaint      Chief Complaint   Patient presents with    Medicare AWV       History of Present Illness      Concha Hess is a 72 y.o. female who presents today for   Chief Complaint   Patient presents with    Medicare AW         Patient presents for her annual wellness visit required by Medicare.  She reports feeling well today.  There are no complaints today.         Review of Systems   All 12 systems otherwise negative.       Family History:  family history includes Cancer in her mother; Colon cancer in her sister; Heart disease in her father; Myasthenia gravis in her mother.   Family history was reviewed with patient.     Medications:  Outpatient Encounter Medications as of 11/14/2023   Medication Sig Dispense Refill    albuterol (PROVENTIL/VENTOLIN HFA) 90 mcg/actuation inhaler Inhale 2 puffs into the lungs every 4 (four) hours as needed for Wheezing or Shortness of Breath. Rescue 18 g 5    amoxicillin (AMOXIL) 500 MG Tab Take by mouth.      b complex vitamins (B-COMPLEX) tablet Take 1 tablet by mouth once daily. 90 tablet 1    BREO ELLIPTA 100-25 mcg/dose diskus inhaler Inhale 1 puff into the lungs once daily. Controller 180 each 3    calcium carbonate-vit D3-min 600 mg calcium- 400 unit Tab Take 1 tablet by mouth 2 (two) times daily. 180 tablet 1    cetirizine (ZYRTEC) 10 MG tablet Take 10 mg by mouth once daily.      clorazepate (TRANXENE) 7.5 MG Tab Take 1 tablet (7.5 mg total) by mouth 2 (two) times daily as needed (anxiety). 60 tablet 0    ergocalciferol (ERGOCALCIFEROL) 50,000 unit Cap Take 50,000 Units by mouth every 7 days.      EScitalopram oxalate (LEXAPRO) 10 MG tablet TAKE 1 TABLET(10 MG) BY MOUTH EVERY DAY 90 tablet 0    esomeprazole (NEXIUM) 20 MG capsule Take 20 mg by mouth before breakfast.      estradioL (ESTRACE) 0.01 % (0.1 mg/gram) vaginal cream 0.5 grams with applicator or dime-sized amount with finger in vagina nightly x 2 weeks, then  three times (Mon, Wed, Fri) a week thereafter 42.5 g 3    mirabegron (MYRBETRIQ) 50 mg Tb24 Take 1 tablet (50 mg total) by mouth once daily. 90 tablet 0    MULTIVITS-MIN/IRON/FA/LUTEIN (ULTIMATE WOMEN'S COMPLETE 50+ ORAL) Take by mouth.      pneumococcal 23-kacie ps (PNEUMOVAX-23) 25 mcg/0.5 mL vaccine Inject into the muscle. 0.5 mL 0    solifenacin (VESICARE) 5 MG tablet Take 1 tablet (5 mg total) by mouth once daily. 90 tablet 0     No facility-administered encounter medications on file as of 11/14/2023.     Review for Opioid Screening: Pt does not have Rx for Opioids    Review for Substance Use Disorders: Patient does not have a controlled substance abuse disorder.        Allergies:  Review of patient's allergies indicates:   Allergen Reactions    Dilaudid [hydromorphone] Nausea And Vomiting    Morphine Nausea And Vomiting       Health Maintenance:  Health Maintenance   Topic Date Due    DEXA Scan  09/30/2022    Mammogram  10/04/2022    Lipid Panel  06/02/2023    Colorectal Cancer Screening  11/09/2024    TETANUS VACCINE  10/21/2029    Hepatitis C Screening  Completed    Shingles Vaccine  Completed     Health Maintenance Topics with due status: Not Due       Topic Last Completion Date    TETANUS VACCINE 10/21/2019    Colorectal Cancer Screening 11/09/2021       Physical Exam       ]    Physical Exam  Vitals reviewed.   Constitutional:       Appearance: Normal appearance. She is normal weight.   HENT:      Head: Normocephalic and atraumatic.      Nose: Nose normal.      Mouth/Throat:      Mouth: Mucous membranes are dry.      Pharynx: Oropharynx is clear.   Eyes:      Extraocular Movements: Extraocular movements intact.      Conjunctiva/sclera: Conjunctivae normal.      Pupils: Pupils are equal, round, and reactive to light.   Cardiovascular:      Rate and Rhythm: Normal rate and regular rhythm.      Pulses: Normal pulses.      Heart sounds: Normal heart sounds.   Pulmonary:      Effort: Pulmonary effort is normal.       Breath sounds: Normal breath sounds.   Musculoskeletal:         General: Normal range of motion.      Cervical back: Normal range of motion and neck supple.   Skin:     General: Skin is warm and dry.      Capillary Refill: Capillary refill takes less than 2 seconds.   Neurological:      General: No focal deficit present.      Mental Status: She is alert and oriented to person, place, and time. Mental status is at baseline.   Psychiatric:         Mood and Affect: Mood normal.         Behavior: Behavior normal.         Thought Content: Thought content normal.         Judgment: Judgment normal.            Assessment/Plan     Concha Hess is a 72 y.o.female with:    Encounter for Medicare annual wellness exam  -     Ambulatory Referral/Consult to Enhanced Annual Wellness Visit (eAWV)        As above, continue current medications and maintain follow up with specialists.  Return to clinic as needed.    Greater than 50% of visit was spent face to face with patient.  All questions were answered to patient's satisfaction.           Karen L Spencer, NP-C [facility]

## 2023-11-13 ENCOUNTER — TELEPHONE (OUTPATIENT)
Dept: ADMINISTRATIVE | Facility: CLINIC | Age: 73
End: 2023-11-13
Payer: MEDICARE

## 2023-11-14 ENCOUNTER — OFFICE VISIT (OUTPATIENT)
Dept: PRIMARY CARE CLINIC | Facility: CLINIC | Age: 73
End: 2023-11-14
Payer: MEDICARE

## 2023-11-14 VITALS
SYSTOLIC BLOOD PRESSURE: 118 MMHG | BODY MASS INDEX: 27.43 KG/M2 | HEIGHT: 62 IN | WEIGHT: 149.06 LBS | DIASTOLIC BLOOD PRESSURE: 82 MMHG | HEART RATE: 73 BPM | TEMPERATURE: 98 F | RESPIRATION RATE: 16 BRPM | OXYGEN SATURATION: 98 %

## 2023-11-14 DIAGNOSIS — Z00.00 ENCOUNTER FOR MEDICARE ANNUAL WELLNESS EXAM: ICD-10-CM

## 2023-11-14 PROCEDURE — 1159F PR MEDICATION LIST DOCUMENTED IN MEDICAL RECORD: ICD-10-PCS | Mod: HCNC,CPTII,S$GLB, | Performed by: NURSE PRACTITIONER

## 2023-11-14 PROCEDURE — 1160F PR REVIEW ALL MEDS BY PRESCRIBER/CLIN PHARMACIST DOCUMENTED: ICD-10-PCS | Mod: HCNC,CPTII,S$GLB, | Performed by: NURSE PRACTITIONER

## 2023-11-14 PROCEDURE — 3079F PR MOST RECENT DIASTOLIC BLOOD PRESSURE 80-89 MM HG: ICD-10-PCS | Mod: HCNC,CPTII,S$GLB, | Performed by: NURSE PRACTITIONER

## 2023-11-14 PROCEDURE — 99999 PR PBB SHADOW E&M-EST. PATIENT-LVL V: ICD-10-PCS | Mod: PBBFAC,HCNC,, | Performed by: NURSE PRACTITIONER

## 2023-11-14 PROCEDURE — 1170F FXNL STATUS ASSESSED: CPT | Mod: HCNC,CPTII,S$GLB, | Performed by: NURSE PRACTITIONER

## 2023-11-14 PROCEDURE — 1101F PR PT FALLS ASSESS DOC 0-1 FALLS W/OUT INJ PAST YR: ICD-10-PCS | Mod: HCNC,CPTII,S$GLB, | Performed by: NURSE PRACTITIONER

## 2023-11-14 PROCEDURE — 3074F PR MOST RECENT SYSTOLIC BLOOD PRESSURE < 130 MM HG: ICD-10-PCS | Mod: HCNC,CPTII,S$GLB, | Performed by: NURSE PRACTITIONER

## 2023-11-14 PROCEDURE — 1159F MED LIST DOCD IN RCRD: CPT | Mod: HCNC,CPTII,S$GLB, | Performed by: NURSE PRACTITIONER

## 2023-11-14 PROCEDURE — 3288F FALL RISK ASSESSMENT DOCD: CPT | Mod: HCNC,CPTII,S$GLB, | Performed by: NURSE PRACTITIONER

## 2023-11-14 PROCEDURE — 1101F PT FALLS ASSESS-DOCD LE1/YR: CPT | Mod: HCNC,CPTII,S$GLB, | Performed by: NURSE PRACTITIONER

## 2023-11-14 PROCEDURE — G0439 PPPS, SUBSEQ VISIT: HCPCS | Mod: HCNC,S$GLB,, | Performed by: NURSE PRACTITIONER

## 2023-11-14 PROCEDURE — 1126F AMNT PAIN NOTED NONE PRSNT: CPT | Mod: HCNC,CPTII,S$GLB, | Performed by: NURSE PRACTITIONER

## 2023-11-14 PROCEDURE — 99999 PR PBB SHADOW E&M-EST. PATIENT-LVL V: CPT | Mod: PBBFAC,HCNC,, | Performed by: NURSE PRACTITIONER

## 2023-11-14 PROCEDURE — G0439 PR MEDICARE ANNUAL WELLNESS SUBSEQUENT VISIT: ICD-10-PCS | Mod: HCNC,S$GLB,, | Performed by: NURSE PRACTITIONER

## 2023-11-14 PROCEDURE — 3074F SYST BP LT 130 MM HG: CPT | Mod: HCNC,CPTII,S$GLB, | Performed by: NURSE PRACTITIONER

## 2023-11-14 PROCEDURE — 3079F DIAST BP 80-89 MM HG: CPT | Mod: HCNC,CPTII,S$GLB, | Performed by: NURSE PRACTITIONER

## 2023-11-14 PROCEDURE — 1170F PR FUNCTIONAL STATUS ASSESSED: ICD-10-PCS | Mod: HCNC,CPTII,S$GLB, | Performed by: NURSE PRACTITIONER

## 2023-11-14 PROCEDURE — 1160F RVW MEDS BY RX/DR IN RCRD: CPT | Mod: HCNC,CPTII,S$GLB, | Performed by: NURSE PRACTITIONER

## 2023-11-14 PROCEDURE — 1126F PR PAIN SEVERITY QUANTIFIED, NO PAIN PRESENT: ICD-10-PCS | Mod: HCNC,CPTII,S$GLB, | Performed by: NURSE PRACTITIONER

## 2023-11-14 PROCEDURE — 3288F PR FALLS RISK ASSESSMENT DOCUMENTED: ICD-10-PCS | Mod: HCNC,CPTII,S$GLB, | Performed by: NURSE PRACTITIONER

## 2023-11-14 RX ORDER — CLINDAMYCIN HYDROCHLORIDE 300 MG/1
300 CAPSULE ORAL EVERY 6 HOURS
COMMUNITY
Start: 2023-11-14

## 2023-11-24 DIAGNOSIS — F41.9 ANXIETY: ICD-10-CM

## 2023-11-24 RX ORDER — CLORAZEPATE DIPOTASSIUM 7.5 MG/1
TABLET ORAL
Qty: 60 TABLET | Refills: 0 | Status: SHIPPED | OUTPATIENT
Start: 2023-11-24 | End: 2024-03-11 | Stop reason: SDUPTHER

## 2023-11-24 NOTE — TELEPHONE ENCOUNTER
No care due was identified.  Health [facility] Embedded Care Due Messages. Reference number: [id number].   11/24/2023 11:44:02 AM CST

## 2023-12-14 ENCOUNTER — PATIENT OUTREACH (OUTPATIENT)
Dept: ADMINISTRATIVE | Facility: HOSPITAL | Age: 73
End: 2023-12-14
Payer: MEDICARE

## 2023-12-14 NOTE — PROGRESS NOTES
Health Maintenance Due   Topic Date Due    RSV Vaccine (Age 60+ and Pregnant patients) (1 - 1-dose 60+ series) Never done    DEXA Scan  09/30/2022    Mammogram  10/04/2022    Lipid Panel  06/02/2023        Chart review done.   HM updated.   Immunizations reviewed & updated.   Care Everywhere updated.  DIS reviewed

## 2023-12-28 DIAGNOSIS — F32.A DEPRESSION, UNSPECIFIED DEPRESSION TYPE: ICD-10-CM

## 2023-12-29 RX ORDER — ESCITALOPRAM OXALATE 10 MG/1
10 TABLET ORAL
Qty: 90 TABLET | Refills: 0 | OUTPATIENT
Start: 2023-12-29

## 2023-12-29 NOTE — TELEPHONE ENCOUNTER
Care Due:                  Date            Visit Type   Department     Provider  --------------------------------------------------------------------------------                                MYCHART                              FOLLOWUP/OF  [facility]  Last Visit: 06-      [facility] VISIT   CARE           John Vinson  Next Visit: None Scheduled  None         None Found                                                            Last  Test          Frequency    Reason                     Performed    Due Date  --------------------------------------------------------------------------------    Office Visit  15 months..  EScitalopram, albuterol..  06- 08-    [facility] Embedded Care Due Messages. Reference number: [id number].   12/28/2023 8:36:21 PM CST

## 2024-01-06 DIAGNOSIS — N39.41 URGE INCONTINENCE OF URINE: ICD-10-CM

## 2024-01-07 RX ORDER — SOLIFENACIN SUCCINATE 5 MG/1
5 TABLET, FILM COATED ORAL DAILY
Qty: 90 TABLET | Refills: 2 | Status: SHIPPED | OUTPATIENT
Start: 2024-01-07

## 2024-01-08 NOTE — TELEPHONE ENCOUNTER
Refill Decision Note   Concha Jimena  is requesting a refill authorization.  Brief Assessment and Rationale for Refill:  Approve     Medication Therapy Plan:         Comments:     Note composed:11:10 PM 01/07/2024

## 2024-01-31 DIAGNOSIS — Z78.0 MENOPAUSE: ICD-10-CM

## 2024-03-11 DIAGNOSIS — F41.9 ANXIETY: ICD-10-CM

## 2024-03-11 NOTE — TELEPHONE ENCOUNTER
Care Due:                  Date            Visit Type   Department     Provider  --------------------------------------------------------------------------------                                MYCHART                              FOLLOWUP/OF  [facility]  Last Visit: 06-      [facility] VISIT   CARE           John Vinson  Next Visit: None Scheduled  None         None Found                                                            Last  Test          Frequency    Reason                     Performed    Due Date  --------------------------------------------------------------------------------    Office Visit  15 months..  EScitalopram, albuterol..  06- 08-    [facility] Embedded Care Due Messages. Reference number: [id number].   3/11/2024 5:52:49 PM CDT

## 2024-03-12 RX ORDER — CLORAZEPATE DIPOTASSIUM 7.5 MG/1
7.5 TABLET ORAL 2 TIMES DAILY PRN
Qty: 60 TABLET | Refills: 0 | Status: SHIPPED | OUTPATIENT
Start: 2024-03-12 | End: 2024-05-09

## 2024-03-17 DIAGNOSIS — F32.A DEPRESSION, UNSPECIFIED DEPRESSION TYPE: ICD-10-CM

## 2024-03-18 RX ORDER — ESCITALOPRAM OXALATE 10 MG/1
10 TABLET ORAL
Qty: 90 TABLET | Refills: 0 | Status: SHIPPED | OUTPATIENT
Start: 2024-03-18 | End: 2024-06-17 | Stop reason: SDUPTHER

## 2024-03-18 NOTE — TELEPHONE ENCOUNTER
No care due was identified.  [facility] Embedded Care Due Messages. Reference number: [id number].   3/17/2024 9:17:16 PM CDT

## 2024-05-08 DIAGNOSIS — F41.9 ANXIETY: ICD-10-CM

## 2024-05-08 NOTE — TELEPHONE ENCOUNTER
No care due was identified.  Health [facility] Embedded Care Due Messages. Reference number: [id number].   5/08/2024 4:36:09 PM CDT

## 2024-05-09 RX ORDER — CLORAZEPATE DIPOTASSIUM 7.5 MG/1
TABLET ORAL
Qty: 60 TABLET | Refills: 0 | Status: SHIPPED | OUTPATIENT
Start: 2024-05-09

## 2024-05-10 NOTE — TELEPHONE ENCOUNTER
Called for pt, no answer. LM on VM to return call    Called to inform pt rx sent to pharmacy. Pt needs follow up appt

## 2024-06-14 DIAGNOSIS — F32.A DEPRESSION, UNSPECIFIED DEPRESSION TYPE: ICD-10-CM

## 2024-06-15 NOTE — TELEPHONE ENCOUNTER
No care due was identified.  [facility] Embedded Care Due Messages. Reference number: [id number].   6/14/2024 9:46:42 PM CDT

## 2024-06-15 NOTE — TELEPHONE ENCOUNTER
Care Due:                  Date            Visit Type   Department     Provider  --------------------------------------------------------------------------------    Last Visit: None Found      None         None Found  Next Visit: None Scheduled  None         None Found                                                            Last  Test          Frequency    Reason                     Performed    Due Date  --------------------------------------------------------------------------------    Office Visit  15 months..  EScitalopram, albuterol..  Not Found    Overdue    [facility] [employer] Embedded Care Due Messages. Reference number: [id number].   6/14/2024 9:38:35 PM CDT

## 2024-06-17 DIAGNOSIS — F32.A DEPRESSION, UNSPECIFIED DEPRESSION TYPE: ICD-10-CM

## 2024-06-17 RX ORDER — ESCITALOPRAM OXALATE 10 MG/1
10 TABLET ORAL DAILY
Qty: 30 TABLET | Refills: 0 | Status: SHIPPED | OUTPATIENT
Start: 2024-06-17

## 2024-06-17 RX ORDER — ESCITALOPRAM OXALATE 10 MG/1
10 TABLET ORAL
Qty: 90 TABLET | Refills: 0 | OUTPATIENT
Start: 2024-06-17

## 2024-06-17 NOTE — TELEPHONE ENCOUNTER
----- Message from Tami Chandra sent at 6/17/2024 11:17 AM CDT -----  Contact: [phone] Patient  Pt is calling in regards to the status of her  EScitalopram oxalate (LEXAPRO) 10 MG tablet  being filled. Pt stated she is going out of town and asking if it can be filled ASAP. Please call and advise. Thank you.

## 2024-06-17 NOTE — TELEPHONE ENCOUNTER
Refill Routing Note   Medication(s) are not appropriate for processing by [facility] for the following reason(s):        Patient not seen by provider within 15 months    [facility] action(s):  Defer               Appointments  past 12m or future 3m with PCP    Date Provider   Last Visit   6/2/2022 John Vinson MD   Next Visit   Visit date not found John Vinson MD   ED visits in past 90 days: 0        Note composed:9:15 AM 06/17/2024

## 2024-06-17 NOTE — TELEPHONE ENCOUNTER
Provider Staff:  Action required for this patient    Requires appointment      Please see care gap opportunities below in Care Due Message.    Thanks!  [facility]     Appointments      Date Provider   Last Visit   6/2/2022 John Vinson MD   Next Visit   6/14/2024 John Vinson MD       Refill Decision Note  Quick DC. Duplicate Request-  med pended in previous encounter, awaiting assessment       Concha Hess  is requesting a refill authorization.  Brief Assessment and Rationale for Refill:  Quick Discontinue     Medication Therapy Plan:         Comments:     Note composed:9:15 AM 06/17/2024

## 2024-06-17 NOTE — TELEPHONE ENCOUNTER
Provider Staff:  Please note Refusal of medication.     Action required for this patient.      Requested Prescriptions     Refused Prescriptions Disp Refills    EScitalopram oxalate (LEXAPRO) 10 MG tablet 90 tablet 0     Sig: Take 1 tablet (10 mg total) by mouth.     Refused By: SHARLENE ARMSTRONG     Reason for Refusal: Patient needs an appointment      Thanks!  [facility]   Note composed: 06/17/2024 12:44 PM

## 2024-06-17 NOTE — TELEPHONE ENCOUNTER
I have sent in 30 days of medication.  The patient has not been seen by her PCP since 2022.  Please have her make an appointment and she can have any refills from this point forward.

## 2024-06-17 NOTE — TELEPHONE ENCOUNTER
No care due was identified.  Health [facility] Embedded Care Due Messages. Reference number: [id number].   6/17/2024 11:33:08 AM CDT

## 2024-07-03 DIAGNOSIS — F41.9 ANXIETY: ICD-10-CM

## 2024-07-03 RX ORDER — CLORAZEPATE DIPOTASSIUM 7.5 MG/1
TABLET ORAL
Qty: 60 TABLET | Refills: 0 | Status: SHIPPED | OUTPATIENT
Start: 2024-07-03

## 2024-07-03 RX ORDER — CLORAZEPATE DIPOTASSIUM 7.5 MG/1
TABLET ORAL
Qty: 60 TABLET | Refills: 0 | OUTPATIENT
Start: 2024-07-03

## 2024-07-03 NOTE — TELEPHONE ENCOUNTER
No care due was identified.  [facility] Embedded Care Due Messages. Reference number: [id number].   7/03/2024 3:01:25 PM CDT

## 2024-07-03 NOTE — TELEPHONE ENCOUNTER
No care due was identified.  [facility] Embedded Care Due Messages. Reference number: [id number].   7/03/2024 3:00:59 PM CDT

## 2024-07-17 DIAGNOSIS — F32.A DEPRESSION, UNSPECIFIED DEPRESSION TYPE: ICD-10-CM

## 2024-07-18 RX ORDER — ALBUTEROL SULFATE 90 UG/1
2 AEROSOL, METERED RESPIRATORY (INHALATION) EVERY 4 HOURS PRN
Qty: 18 G | Refills: 5 | Status: SHIPPED | OUTPATIENT
Start: 2024-07-18

## 2024-07-18 RX ORDER — ESCITALOPRAM OXALATE 10 MG/1
10 TABLET ORAL DAILY
Qty: 30 TABLET | Refills: 0 | Status: SHIPPED | OUTPATIENT
Start: 2024-07-18

## 2024-07-18 NOTE — TELEPHONE ENCOUNTER
No care due was identified.  [facility] Embedded Care Due Messages. Reference number: [id number].   7/17/2024 7:40:36 PM CDT

## 2024-07-19 ENCOUNTER — PATIENT MESSAGE (OUTPATIENT)
Dept: PRIMARY CARE CLINIC | Facility: CLINIC | Age: 74
End: 2024-07-19
Payer: MEDICARE

## 2024-08-15 ENCOUNTER — OFFICE VISIT (OUTPATIENT)
Dept: PRIMARY CARE CLINIC | Facility: CLINIC | Age: 74
End: 2024-08-15
Payer: MEDICARE

## 2024-08-15 ENCOUNTER — LAB VISIT (OUTPATIENT)
Dept: LAB | Facility: HOSPITAL | Age: 74
End: 2024-08-15
Attending: FAMILY MEDICINE
Payer: MEDICARE

## 2024-08-15 VITALS
SYSTOLIC BLOOD PRESSURE: 116 MMHG | BODY MASS INDEX: 28.32 KG/M2 | RESPIRATION RATE: 20 BRPM | DIASTOLIC BLOOD PRESSURE: 80 MMHG | HEART RATE: 80 BPM | WEIGHT: 153.88 LBS | TEMPERATURE: 97 F | OXYGEN SATURATION: 98 % | HEIGHT: 62 IN

## 2024-08-15 DIAGNOSIS — E55.9 VITAMIN D DEFICIENCY: ICD-10-CM

## 2024-08-15 DIAGNOSIS — J44.9 CHRONIC OBSTRUCTIVE PULMONARY DISEASE, UNSPECIFIED COPD TYPE: ICD-10-CM

## 2024-08-15 DIAGNOSIS — E78.5 HYPERLIPIDEMIA, UNSPECIFIED HYPERLIPIDEMIA TYPE: ICD-10-CM

## 2024-08-15 DIAGNOSIS — N95.2 VAGINAL ATROPHY: ICD-10-CM

## 2024-08-15 DIAGNOSIS — F41.9 ANXIETY: ICD-10-CM

## 2024-08-15 DIAGNOSIS — E53.8 B12 DEFICIENCY: ICD-10-CM

## 2024-08-15 DIAGNOSIS — F32.A DEPRESSION, UNSPECIFIED DEPRESSION TYPE: ICD-10-CM

## 2024-08-15 DIAGNOSIS — Z12.31 ENCOUNTER FOR SCREENING MAMMOGRAM FOR BREAST CANCER: ICD-10-CM

## 2024-08-15 DIAGNOSIS — R53.83 FATIGUE, UNSPECIFIED TYPE: Primary | ICD-10-CM

## 2024-08-15 DIAGNOSIS — R53.83 FATIGUE, UNSPECIFIED TYPE: ICD-10-CM

## 2024-08-15 PROBLEM — R35.1 NOCTURIA: Status: RESOLVED | Noted: 2017-08-09 | Resolved: 2024-08-15

## 2024-08-15 LAB
25(OH)D3+25(OH)D2 SERPL-MCNC: 22 NG/ML (ref 30–96)
ALBUMIN SERPL BCP-MCNC: 3.9 G/DL (ref 3.5–5.2)
ALP SERPL-CCNC: 77 U/L (ref 55–135)
ALT SERPL W/O P-5'-P-CCNC: 19 U/L (ref 10–44)
ANION GAP SERPL CALC-SCNC: 7 MMOL/L (ref 8–16)
AST SERPL-CCNC: 15 U/L (ref 10–40)
BASOPHILS # BLD AUTO: 0.09 K/UL (ref 0–0.2)
BASOPHILS NFR BLD: 1.1 % (ref 0–1.9)
BILIRUB SERPL-MCNC: 0.4 MG/DL (ref 0.1–1)
BUN SERPL-MCNC: 13 MG/DL (ref 8–23)
CALCIUM SERPL-MCNC: 9.6 MG/DL (ref 8.7–10.5)
CHLORIDE SERPL-SCNC: 105 MMOL/L (ref 95–110)
CHOLEST SERPL-MCNC: 282 MG/DL (ref 120–199)
CHOLEST/HDLC SERPL: 4.3 {RATIO} (ref 2–5)
CO2 SERPL-SCNC: 28 MMOL/L (ref 23–29)
CREAT SERPL-MCNC: 0.9 MG/DL (ref 0.5–1.4)
DIFFERENTIAL METHOD BLD: ABNORMAL
EOSINOPHIL # BLD AUTO: 0.6 K/UL (ref 0–0.5)
EOSINOPHIL NFR BLD: 7.1 % (ref 0–8)
ERYTHROCYTE [DISTWIDTH] IN BLOOD BY AUTOMATED COUNT: 13.6 % (ref 11.5–14.5)
EST. GFR  (NO RACE VARIABLE): >60 ML/MIN/1.73 M^2
FOLATE SERPL-MCNC: 7.6 NG/ML (ref 4–24)
GLUCOSE SERPL-MCNC: 97 MG/DL (ref 70–110)
HCT VFR BLD AUTO: 41.5 % (ref 37–48.5)
HDLC SERPL-MCNC: 65 MG/DL (ref 40–75)
HDLC SERPL: 23 % (ref 20–50)
HGB BLD-MCNC: 13.2 G/DL (ref 12–16)
IMM GRANULOCYTES # BLD AUTO: 0.03 K/UL (ref 0–0.04)
IMM GRANULOCYTES NFR BLD AUTO: 0.4 % (ref 0–0.5)
LDLC SERPL CALC-MCNC: 194.4 MG/DL (ref 63–159)
LYMPHOCYTES # BLD AUTO: 3 K/UL (ref 1–4.8)
LYMPHOCYTES NFR BLD: 37.7 % (ref 18–48)
MCH RBC QN AUTO: 29.8 PG (ref 27–31)
MCHC RBC AUTO-ENTMCNC: 31.8 G/DL (ref 32–36)
MCV RBC AUTO: 94 FL (ref 82–98)
MONOCYTES # BLD AUTO: 0.6 K/UL (ref 0.3–1)
MONOCYTES NFR BLD: 6.9 % (ref 4–15)
NEUTROPHILS # BLD AUTO: 3.7 K/UL (ref 1.8–7.7)
NEUTROPHILS NFR BLD: 46.8 % (ref 38–73)
NONHDLC SERPL-MCNC: 217 MG/DL
NRBC BLD-RTO: 0 /100 WBC
PLATELET # BLD AUTO: 516 K/UL (ref 150–450)
PMV BLD AUTO: 9.8 FL (ref 9.2–12.9)
POTASSIUM SERPL-SCNC: 4.2 MMOL/L (ref 3.5–5.1)
PROT SERPL-MCNC: 7.1 G/DL (ref 6–8.4)
RBC # BLD AUTO: 4.43 M/UL (ref 4–5.4)
SODIUM SERPL-SCNC: 140 MMOL/L (ref 136–145)
TRIGL SERPL-MCNC: 113 MG/DL (ref 30–150)
TSH SERPL DL<=0.005 MIU/L-ACNC: 1.92 UIU/ML (ref 0.4–4)
VIT B12 SERPL-MCNC: 573 PG/ML (ref 210–950)
WBC # BLD AUTO: 7.98 K/UL (ref 3.9–12.7)

## 2024-08-15 PROCEDURE — 1126F AMNT PAIN NOTED NONE PRSNT: CPT | Mod: HCNC,CPTII,S$GLB, | Performed by: FAMILY MEDICINE

## 2024-08-15 PROCEDURE — 80061 LIPID PANEL: CPT | Mod: HCNC | Performed by: FAMILY MEDICINE

## 2024-08-15 PROCEDURE — 1160F RVW MEDS BY RX/DR IN RCRD: CPT | Mod: HCNC,CPTII,S$GLB, | Performed by: FAMILY MEDICINE

## 2024-08-15 PROCEDURE — 84443 ASSAY THYROID STIM HORMONE: CPT | Mod: HCNC | Performed by: FAMILY MEDICINE

## 2024-08-15 PROCEDURE — 36415 COLL VENOUS BLD VENIPUNCTURE: CPT | Mod: HCNC,PN | Performed by: FAMILY MEDICINE

## 2024-08-15 PROCEDURE — 3008F BODY MASS INDEX DOCD: CPT | Mod: HCNC,CPTII,S$GLB, | Performed by: FAMILY MEDICINE

## 2024-08-15 PROCEDURE — 99999 PR PBB SHADOW E&M-EST. PATIENT-LVL III: CPT | Mod: PBBFAC,HCNC,, | Performed by: FAMILY MEDICINE

## 2024-08-15 PROCEDURE — 3074F SYST BP LT 130 MM HG: CPT | Mod: HCNC,CPTII,S$GLB, | Performed by: FAMILY MEDICINE

## 2024-08-15 PROCEDURE — 82306 VITAMIN D 25 HYDROXY: CPT | Mod: HCNC | Performed by: FAMILY MEDICINE

## 2024-08-15 PROCEDURE — 1159F MED LIST DOCD IN RCRD: CPT | Mod: HCNC,CPTII,S$GLB, | Performed by: FAMILY MEDICINE

## 2024-08-15 PROCEDURE — 85025 COMPLETE CBC W/AUTO DIFF WBC: CPT | Mod: HCNC | Performed by: FAMILY MEDICINE

## 2024-08-15 PROCEDURE — 1101F PT FALLS ASSESS-DOCD LE1/YR: CPT | Mod: HCNC,CPTII,S$GLB, | Performed by: FAMILY MEDICINE

## 2024-08-15 PROCEDURE — 82607 VITAMIN B-12: CPT | Mod: HCNC | Performed by: FAMILY MEDICINE

## 2024-08-15 PROCEDURE — 3079F DIAST BP 80-89 MM HG: CPT | Mod: HCNC,CPTII,S$GLB, | Performed by: FAMILY MEDICINE

## 2024-08-15 PROCEDURE — 3288F FALL RISK ASSESSMENT DOCD: CPT | Mod: HCNC,CPTII,S$GLB, | Performed by: FAMILY MEDICINE

## 2024-08-15 PROCEDURE — 80053 COMPREHEN METABOLIC PANEL: CPT | Mod: HCNC | Performed by: FAMILY MEDICINE

## 2024-08-15 PROCEDURE — 99214 OFFICE O/P EST MOD 30 MIN: CPT | Mod: HCNC,S$GLB,, | Performed by: FAMILY MEDICINE

## 2024-08-15 PROCEDURE — 82746 ASSAY OF FOLIC ACID SERUM: CPT | Mod: HCNC | Performed by: FAMILY MEDICINE

## 2024-08-15 RX ORDER — FLUTICASONE FUROATE AND VILANTEROL TRIFENATATE 100; 25 UG/1; UG/1
1 POWDER RESPIRATORY (INHALATION) DAILY
Qty: 180 EACH | Refills: 3 | Status: SHIPPED | OUTPATIENT
Start: 2024-08-15

## 2024-08-15 RX ORDER — ROSUVASTATIN CALCIUM 10 MG/1
10 TABLET, COATED ORAL DAILY
Qty: 90 TABLET | Refills: 3 | Status: SHIPPED | OUTPATIENT
Start: 2024-08-15

## 2024-08-15 RX ORDER — ERGOCALCIFEROL 1.25 MG/1
50000 CAPSULE ORAL
Qty: 12 CAPSULE | Refills: 3 | Status: SHIPPED | OUTPATIENT
Start: 2024-08-15

## 2024-08-15 RX ORDER — CLORAZEPATE DIPOTASSIUM 7.5 MG/1
7.5 TABLET ORAL 2 TIMES DAILY PRN
Qty: 60 TABLET | Refills: 5 | Status: SHIPPED | OUTPATIENT
Start: 2024-08-15

## 2024-08-15 RX ORDER — ESTRADIOL 0.1 MG/G
CREAM VAGINAL
Qty: 42.5 G | Refills: 3 | Status: SHIPPED | OUTPATIENT
Start: 2024-08-15

## 2024-08-15 RX ORDER — ESCITALOPRAM OXALATE 10 MG/1
10 TABLET ORAL DAILY
Qty: 90 TABLET | Refills: 3 | Status: SHIPPED | OUTPATIENT
Start: 2024-08-15

## 2024-08-15 RX ORDER — PREDNISONE 20 MG/1
20 TABLET ORAL DAILY
Qty: 5 TABLET | Refills: 0 | Status: SHIPPED | OUTPATIENT
Start: 2024-08-15 | End: 2024-08-20

## 2024-08-15 NOTE — PROGRESS NOTES
"Subjective:       Patient ID: Concha Hess is a 73 y.o. female.    Chief Complaint: Cough (Started on 8/09.) and Fatigue    Cough  This is a recurrent problem. The current episode started in the past 7 days. The problem has been unchanged. The problem occurs every few minutes. The cough is Productive of sputum (clear sputum). Associated symptoms include wheezing. Pertinent negatives include no chest pain, chills, fever, headaches, hemoptysis or shortness of breath. She has tried a beta-agonist inhaler for the symptoms. The treatment provided mild relief. Her past medical history is significant for asthma, bronchitis and COPD.   Fatigue  This is a new problem. The current episode started more than 1 month ago. The problem occurs constantly. The problem has been gradually worsening. Associated symptoms include coughing and fatigue. Pertinent negatives include no abdominal pain, anorexia, change in bowel habit, chest pain, chills, diaphoresis, fever, headaches, vomiting or weakness. The symptoms are aggravated by stress. She has tried nothing for the symptoms.     Review of Systems   Constitutional:  Positive for fatigue. Negative for chills, diaphoresis and fever.   Respiratory:  Positive for cough and wheezing. Negative for hemoptysis and shortness of breath.    Cardiovascular:  Negative for chest pain.   Gastrointestinal:  Negative for abdominal pain, anorexia, blood in stool, change in bowel habit and vomiting.   Neurological:  Negative for weakness and headaches.   Hematological:  Does not bruise/bleed easily.   Psychiatric/Behavioral:  Positive for dysphoric mood. The patient is nervous/anxious.        Objective:      Vitals:    08/15/24 1117   BP: 116/80   BP Location: Right arm   Patient Position: Sitting   BP Method: Medium (Manual)   Pulse: 80   Resp: 20   Temp: 97.3 °F (36.3 °C)   TempSrc: Temporal   SpO2: 98%   Weight: 69.8 kg (153 lb 14.1 oz)   Height: 5' 2" (1.575 m)     BP Readings from Last 5 " Encounters:   08/15/24 116/80   11/14/23 118/82   09/28/23 (!) 140/63   09/11/23 131/71   05/30/23 122/86     Wt Readings from Last 5 Encounters:   08/15/24 69.8 kg (153 lb 14.1 oz)   11/14/23 67.6 kg (149 lb 0.5 oz)   09/28/23 65.8 kg (145 lb)   09/11/23 67.1 kg (147 lb 14.9 oz)   03/11/23 67.1 kg (148 lb)     Physical Exam  Vitals and nursing note reviewed.   Constitutional:       General: She is not in acute distress.     Appearance: Normal appearance. She is well-developed.   HENT:      Head: Normocephalic and atraumatic.   Cardiovascular:      Rate and Rhythm: Normal rate and regular rhythm.      Heart sounds: Normal heart sounds.   Pulmonary:      Effort: Pulmonary effort is normal. No respiratory distress.      Breath sounds: Wheezing present.   Musculoskeletal:      Right lower leg: No edema.      Left lower leg: No edema.   Skin:     General: Skin is warm and dry.   Neurological:      Mental Status: She is alert and oriented to person, place, and time.   Psychiatric:         Mood and Affect: Mood normal.         Behavior: Behavior normal.         Lab Results   Component Value Date    WBC 8.98 06/02/2022    HGB 14.2 06/02/2022    HCT 45.1 06/02/2022     06/02/2022    CHOL 251 (H) 06/02/2022    TRIG 109 06/02/2022    HDL 57 06/02/2022    ALT 11 06/02/2022    AST 18 06/02/2022     06/02/2022    K 3.9 06/02/2022     06/02/2022    CREATININE 0.8 06/02/2022    BUN 8 06/02/2022    CO2 25 06/02/2022    TSH 4.17 10/21/2019    INR 1.0 06/02/2022      Assessment:       1. Fatigue, unspecified type    2. B12 deficiency    3. Encounter for screening mammogram for breast cancer    4. Hyperlipidemia, unspecified hyperlipidemia type    5. Vitamin D deficiency    6. Chronic obstructive pulmonary disease, unspecified COPD type    7. Depression, unspecified depression type    8. Vaginal atrophy    9. Anxiety        Plan:       Fatigue, unspecified type  -     Comprehensive Metabolic Panel; Future; Expected  date: 08/15/2024  -     TSH; Future; Expected date: 08/15/2024  -     Folate; Future; Expected date: 08/15/2024    B12 deficiency  -     CBC Auto Differential; Future; Expected date: 08/15/2024  -     Vitamin B12; Future; Expected date: 08/15/2024  -     Folate; Future; Expected date: 08/15/2024    Encounter for screening mammogram for breast cancer  -     Mammo Digital Screening Bilat w/ Herrera; Future; Expected date: 08/15/2024    Hyperlipidemia, unspecified hyperlipidemia type  -     Lipid Panel; Future; Expected date: 08/15/2024    Vitamin D deficiency  -     Vitamin D; Future; Expected date: 08/15/2024    Chronic obstructive pulmonary disease, unspecified COPD type  -     predniSONE (DELTASONE) 20 MG tablet; Take 1 tablet (20 mg total) by mouth once daily. for 5 days  Dispense: 5 tablet; Refill: 0  -     BREO ELLIPTA 100-25 mcg/dose diskus inhaler; Inhale 1 puff into the lungs once daily. Controller  Dispense: 180 each; Refill: 3    Depression, unspecified depression type  -     EScitalopram oxalate (LEXAPRO) 10 MG tablet; Take 1 tablet (10 mg total) by mouth once daily.  Dispense: 90 tablet; Refill: 3    Vaginal atrophy  -     estradioL (ESTRACE) 0.01 % (0.1 mg/gram) vaginal cream; 0.5 grams with applicator or dime-sized amount with finger in vagina nightly x 2 weeks, then three times (Mon, Wed, Fri) a week thereafter  Dispense: 42.5 g; Refill: 3    Anxiety  -     clorazepate (TRANXENE) 7.5 MG Tab; Take 1 tablet (7.5 mg total) by mouth 2 (two) times daily as needed (anxiety).  Dispense: 60 tablet; Refill: 5      Medication List with Changes/Refills   New Medications    PREDNISONE (DELTASONE) 20 MG TABLET    Take 1 tablet (20 mg total) by mouth once daily. for 5 days   Current Medications    ALBUTEROL (PROVENTIL/VENTOLIN HFA) 90 MCG/ACTUATION INHALER    Inhale 2 puffs into the lungs every 4 (four) hours as needed for Wheezing or Shortness of Breath. Rescue    CALCIUM CARBONATE-VIT D3- MG CALCIUM- 400 UNIT  TAB    Take 1 tablet by mouth 2 (two) times daily.    CETIRIZINE (ZYRTEC) 10 MG TABLET    Take 10 mg by mouth once daily.    ERGOCALCIFEROL (ERGOCALCIFEROL) 50,000 UNIT CAP    Take 50,000 Units by mouth every 7 days.    ESOMEPRAZOLE (NEXIUM) 20 MG CAPSULE    Take 20 mg by mouth before breakfast.    MULTIVITS-MIN/IRON/FA/LUTEIN (ULTIMATE WOMEN'S COMPLETE 50+ ORAL)    Take by mouth.   Changed and/or Refilled Medications    Modified Medication Previous Medication    BREO ELLIPTA 100-25 MCG/DOSE DISKUS INHALER BREO ELLIPTA 100-25 mcg/dose diskus inhaler       Inhale 1 puff into the lungs once daily. Controller    Inhale 1 puff into the lungs once daily. Controller    CLORAZEPATE (TRANXENE) 7.5 MG TAB clorazepate (TRANXENE) 7.5 MG Tab       Take 1 tablet (7.5 mg total) by mouth 2 (two) times daily as needed (anxiety).    TAKE 1 TABLET(7.5 MG) BY MOUTH TWICE DAILY AS NEEDED FOR ANXIETY    ESCITALOPRAM OXALATE (LEXAPRO) 10 MG TABLET EScitalopram oxalate (LEXAPRO) 10 MG tablet       Take 1 tablet (10 mg total) by mouth once daily.    Take 1 tablet (10 mg total) by mouth once daily.    ESTRADIOL (ESTRACE) 0.01 % (0.1 MG/GRAM) VAGINAL CREAM estradioL (ESTRACE) 0.01 % (0.1 mg/gram) vaginal cream       0.5 grams with applicator or dime-sized amount with finger in vagina nightly x 2 weeks, then three times (Mon, Wed, Fri) a week thereafter    0.5 grams with applicator or dime-sized amount with finger in vagina nightly x 2 weeks, then three times (Mon, Wed, Fri) a week thereafter   Discontinued Medications    AMOXICILLIN (AMOXIL) 500 MG TAB    Take by mouth.    B COMPLEX VITAMINS (B-COMPLEX) TABLET    Take 1 tablet by mouth once daily.    CLINDAMYCIN (CLEOCIN) 300 MG CAPSULE    Take 300 mg by mouth every 6 (six) hours.    MIRABEGRON (MYRBETRIQ) 50 MG TB24    Take 1 tablet (50 mg total) by mouth once daily.    PNEUMOCOCCAL 23-MARTÍN PS (PNEUMOVAX-23) 25 MCG/0.5 ML VACCINE    Inject into the muscle.    SOLIFENACIN (VESICARE) 5 MG  TABLET    Take 1 tablet (5 mg total) by mouth once daily.

## 2024-09-19 ENCOUNTER — PATIENT MESSAGE (OUTPATIENT)
Dept: PRIMARY CARE CLINIC | Facility: CLINIC | Age: 74
End: 2024-09-19
Payer: MEDICARE

## 2024-10-25 ENCOUNTER — TELEPHONE (OUTPATIENT)
Dept: PRIMARY CARE CLINIC | Facility: CLINIC | Age: 74
End: 2024-10-25
Payer: MEDICARE

## 2024-12-12 ENCOUNTER — TELEPHONE (OUTPATIENT)
Dept: PRIMARY CARE CLINIC | Facility: CLINIC | Age: 74
End: 2024-12-12
Payer: MEDICARE

## 2024-12-12 NOTE — TELEPHONE ENCOUNTER
Angusm for pt informing her that appt request has been addressed and she can reschedule if her availability has changed. She is scheduled for an annual 01/02/24 with Dr. Vinson at [facility].

## 2025-01-08 ENCOUNTER — TELEPHONE (OUTPATIENT)
Dept: PRIMARY CARE CLINIC | Facility: CLINIC | Age: 75
End: 2025-01-08
Payer: MEDICARE

## 2025-02-05 DIAGNOSIS — Z78.0 MENOPAUSE: ICD-10-CM

## 2025-02-06 ENCOUNTER — OFFICE VISIT (OUTPATIENT)
Dept: PRIMARY CARE CLINIC | Facility: CLINIC | Age: 75
End: 2025-02-06
Payer: MEDICARE

## 2025-02-06 ENCOUNTER — LAB VISIT (OUTPATIENT)
Dept: LAB | Facility: HOSPITAL | Age: 75
End: 2025-02-06
Attending: FAMILY MEDICINE
Payer: MEDICARE

## 2025-02-06 VITALS
DIASTOLIC BLOOD PRESSURE: 80 MMHG | RESPIRATION RATE: 18 BRPM | HEART RATE: 76 BPM | WEIGHT: 136.25 LBS | BODY MASS INDEX: 25.07 KG/M2 | HEIGHT: 62 IN | OXYGEN SATURATION: 96 % | TEMPERATURE: 98 F | SYSTOLIC BLOOD PRESSURE: 126 MMHG

## 2025-02-06 DIAGNOSIS — E78.5 HYPERLIPIDEMIA, UNSPECIFIED HYPERLIPIDEMIA TYPE: ICD-10-CM

## 2025-02-06 DIAGNOSIS — E55.9 VITAMIN D DEFICIENCY: ICD-10-CM

## 2025-02-06 DIAGNOSIS — E78.5 HYPERLIPIDEMIA, UNSPECIFIED HYPERLIPIDEMIA TYPE: Primary | ICD-10-CM

## 2025-02-06 DIAGNOSIS — F51.04 CHRONIC INSOMNIA: ICD-10-CM

## 2025-02-06 DIAGNOSIS — J44.9 CHRONIC OBSTRUCTIVE PULMONARY DISEASE, UNSPECIFIED COPD TYPE: ICD-10-CM

## 2025-02-06 LAB
25(OH)D3+25(OH)D2 SERPL-MCNC: 38 NG/ML (ref 30–96)
ALBUMIN SERPL BCP-MCNC: 4 G/DL (ref 3.5–5.2)
ALP SERPL-CCNC: 56 U/L (ref 40–150)
ALT SERPL W/O P-5'-P-CCNC: 17 U/L (ref 10–44)
ANION GAP SERPL CALC-SCNC: 10 MMOL/L (ref 8–16)
AST SERPL-CCNC: 17 U/L (ref 10–40)
BILIRUB SERPL-MCNC: 0.5 MG/DL (ref 0.1–1)
BUN SERPL-MCNC: 6 MG/DL (ref 8–23)
CALCIUM SERPL-MCNC: 9.4 MG/DL (ref 8.7–10.5)
CHLORIDE SERPL-SCNC: 107 MMOL/L (ref 95–110)
CO2 SERPL-SCNC: 26 MMOL/L (ref 23–29)
CREAT SERPL-MCNC: 0.7 MG/DL (ref 0.5–1.4)
EST. GFR  (NO RACE VARIABLE): >60 ML/MIN/1.73 M^2
GLUCOSE SERPL-MCNC: 87 MG/DL (ref 70–110)
POTASSIUM SERPL-SCNC: 4.3 MMOL/L (ref 3.5–5.1)
PROT SERPL-MCNC: 7 G/DL (ref 6–8.4)
SODIUM SERPL-SCNC: 143 MMOL/L (ref 136–145)

## 2025-02-06 PROCEDURE — 99214 OFFICE O/P EST MOD 30 MIN: CPT | Mod: HCNC,S$GLB,, | Performed by: FAMILY MEDICINE

## 2025-02-06 PROCEDURE — 99999 PR PBB SHADOW E&M-EST. PATIENT-LVL IV: CPT | Mod: PBBFAC,HCNC,, | Performed by: FAMILY MEDICINE

## 2025-02-06 PROCEDURE — 3008F BODY MASS INDEX DOCD: CPT | Mod: HCNC,CPTII,S$GLB, | Performed by: FAMILY MEDICINE

## 2025-02-06 PROCEDURE — 36415 COLL VENOUS BLD VENIPUNCTURE: CPT | Mod: HCNC,PN | Performed by: FAMILY MEDICINE

## 2025-02-06 PROCEDURE — 3079F DIAST BP 80-89 MM HG: CPT | Mod: HCNC,CPTII,S$GLB, | Performed by: FAMILY MEDICINE

## 2025-02-06 PROCEDURE — 80061 LIPID PANEL: CPT | Mod: HCNC | Performed by: FAMILY MEDICINE

## 2025-02-06 PROCEDURE — 1159F MED LIST DOCD IN RCRD: CPT | Mod: HCNC,CPTII,S$GLB, | Performed by: FAMILY MEDICINE

## 2025-02-06 PROCEDURE — 1126F AMNT PAIN NOTED NONE PRSNT: CPT | Mod: HCNC,CPTII,S$GLB, | Performed by: FAMILY MEDICINE

## 2025-02-06 PROCEDURE — 1101F PT FALLS ASSESS-DOCD LE1/YR: CPT | Mod: HCNC,CPTII,S$GLB, | Performed by: FAMILY MEDICINE

## 2025-02-06 PROCEDURE — 82306 VITAMIN D 25 HYDROXY: CPT | Mod: HCNC | Performed by: FAMILY MEDICINE

## 2025-02-06 PROCEDURE — 80053 COMPREHEN METABOLIC PANEL: CPT | Mod: HCNC | Performed by: FAMILY MEDICINE

## 2025-02-06 PROCEDURE — 1160F RVW MEDS BY RX/DR IN RCRD: CPT | Mod: HCNC,CPTII,S$GLB, | Performed by: FAMILY MEDICINE

## 2025-02-06 PROCEDURE — 3074F SYST BP LT 130 MM HG: CPT | Mod: HCNC,CPTII,S$GLB, | Performed by: FAMILY MEDICINE

## 2025-02-06 PROCEDURE — 3288F FALL RISK ASSESSMENT DOCD: CPT | Mod: HCNC,CPTII,S$GLB, | Performed by: FAMILY MEDICINE

## 2025-02-06 RX ORDER — TRAZODONE HYDROCHLORIDE 50 MG/1
50-150 TABLET ORAL NIGHTLY PRN
Qty: 90 TABLET | Refills: 1 | Status: SHIPPED | OUTPATIENT
Start: 2025-02-06

## 2025-02-06 NOTE — PROGRESS NOTES
Assessment:       1. Hyperlipidemia, unspecified hyperlipidemia type    2. Vitamin D deficiency    3. Chronic insomnia    4. Chronic obstructive pulmonary disease, unspecified COPD type         Plan:       Hyperlipidemia, unspecified hyperlipidemia type  -     Comprehensive Metabolic Panel; Future; Expected date: 02/06/2025  -     Lipoprotein Analysis, by NMR; Future; Expected date: 02/06/2025    Vitamin D deficiency  -     Vitamin D; Future; Expected date: 02/06/2025    Chronic insomnia  -     traZODone (DESYREL) 50 MG tablet; Take 1-3 tablets ( mg total) by mouth nightly as needed for Insomnia.  Dispense: 90 tablet; Refill: 1    Chronic obstructive pulmonary disease, unspecified COPD type      Assessment & Plan    - Recommend trazodone for insomnia, starting at 50mg and titrating up to 150mg as needed, as a safer alternative to current regimen  - Advised gradual reduction of Tranxene dosage, starting with halving current dose  - Ordered advanced lipid testing (NMR lipoprofile) for a more detailed cholesterol breakdown  - Noted significant weight loss of 20 lbs in 6 months, considering potential health implications  - Reviewed blood pressure history, determining it to be generally within normal range  - Discussed potential genetic component of patient's cholesterol issues  - Decided against recommending COVID booster shot    HYPERLIPIDEMIA:   Ordered NMR lipoprofile for advanced lipid testing instead of a standard lipid panel.   Acknowledged that some of the cholesterol issues might have a genetic component.   Explained that the advanced lipid testing will provide a more detailed breakdown of the cholesterol.    BLOOD PRESSURE:   Reviewed past blood pressure readings with the patient.   Evaluated that only one borderline reading of 140 systolic was recorded in the past 2-3 years, with most readings in the 110s-120s/70s-80s range.   Assessed that the patient's blood pressure is within normal limits based on  CHIEF COMPLAINT:  Chief Complaint   Patient presents with   • Vomiting     Vomiting, diarrhea since Thursday. Cough since yesterday   • Other     Here with mom       HISTORY OF PRESENT ILLNESS:  Rachel Hooks is a 9 month old female who presents today with her mother who has some cold symptoms as well; she had vomiting/diarrhea earlier in the week, yesterday had mild episode of vomiting, none today.  Last diarrhea 2 days ago.  Eating/drinking well. Now started with cough yesterday.  No runny nose. No fevers.  Exposure to COVID and other children over the holidays.    ALLERGIES:  No Known Allergies    I have reviewed the patient's medications and allergies, past medical, surgical, social and family history, updating these as appropriate.  See Histories section of the EMR for a display of this information.    PRIMARY CARE PROVIDER:  Jacquelin Diaz MD    PHYSICAL EXAM:  Vitals:    01/01/22 0816   Pulse: 130   Resp: (!) 22   Temp: 98.6 °F (37 °C)   TempSrc: Rectal   SpO2: 97%   Weight: 9.426 kg (20 lb 12.5 oz)   GENERAL: Well developed, alert, interactive female; appears in no acute distress. Appears comfortable.  EARS: External ears clear. Canals clear bilaterally, tympanic membranes grey and translucent without erythema or bulging.  NOSE: External nose clear. No crusting or erythema. No rhinorrhea.  MOUTH: External mouth clear. Buccal mucosa pink and moist. No lesions. Oropharynx without edema or erythema.  CARDIAC: S1 and S2 heard; rate and rhythm regular.  LUNGS: No cough present.  Even unlabored respirations, clear to auscultation of anterior and posterior lung fields without wheezing, rhonchi or crackles.     DIAGNOSTICS:      No results found.      ASSESSMENT/PLAN:  Rachel was seen today for vomiting and other.    Diagnoses and all orders for this visit:    Cough  -     Cancel: 2019 NOVEL CORONAVIRUS (SARS-COV-2)  -     COVID/FLU/RSV PANEL    Vomiting and diarrhea      Vomiting/diarrhea improved, now with  cough.  Symptoms are most likely viral in origin.  COVID/flu/rsv panel pending.  Supportive measures discussed. Follow up if symptoms persist or worsen.    The patient's mother's questions were answered and she agrees with this plan and is encouraged to call if any questions or concerns. Written instructions were given.   past readings.    INSOMNIA:   Prescribed trazodone 50mg at bedtime for insomnia.   Instructed the patient to increase to 2-3 tablets (100-150mg) if needed for sleep.   Decreased Tranxene dosage: Advised to break current 7.5mg tablet in half, taking half dose at bedtime.   Continued Lexapro prescription.   Discontinued OTC sleep aids (e.g., Z-Quil).   Explained that trazodone is an old tricyclic antidepressant used at lower doses for insomnia.   Discussed the limitations and potential side effects of OTC sleep aids.   Informed about the safety profile of trazodone for older adults and its non-habit-forming nature.   Clarified that trazodone is not a narcotic.   Noted the patient reports difficulty falling asleep and staying asleep, with sleep often not occurring until 1:00 or 2:00 AM.   Observed that the patient reports best sleep occurring between 5:00 AM and 11:00 AM.   Evaluated that previous sleep aids (Tranxene and Lexapro) are no longer effective.   Acknowledged the long-term use of Tranxene and suggested potentially reducing or eliminating its use.    MEDICATIONS/SUPPLEMENTS:   Noted the patient mentions using Breo, which may be an aromatase inhibitor.    VITAMIN D DEFICIENCY:   Ordered Vitamin D level test.   Noted the patient reports taking the last of prescribed vitamin D this week.   Planned to check vitamin D levels before refilling prescription.    BREAST CANCER SCREENING:   Instructed the patient to schedule and complete overdue mammogram.   Advised follow-up to schedule mammogram with staff across from the elevators.   Emphasized that the patient is overdue for mammogram and should prioritize this screening.    COLORECTAL CANCER SCREENING:   Instructed the patient to schedule and complete colonoscopy with Dr. Brizuela.   Inquired about the patient's colonoscopy status and emphasized its importance.    OSTEOPOROSIS SCREENING:   Instructed the patient to schedule bone density test.   Noted that the patient  is due for a bone density test.   Noted the patient mentions being 15-18 years post-menopause at age 74.    WEIGHT MANAGEMENT:   Noted the patient has lost almost 20 lbs since August by reducing fast food and ice cream consumption.   Acknowledged and praised the patient's significant weight loss of 20 lbs in 6 months.   Noted the patient reports dietary changes, including reducing fast food and ice cream consumption.       Medication List with Changes/Refills   New Medications    TRAZODONE (DESYREL) 50 MG TABLET    Take 1-3 tablets ( mg total) by mouth nightly as needed for Insomnia.   Current Medications    ALBUTEROL (PROVENTIL/VENTOLIN HFA) 90 MCG/ACTUATION INHALER    Inhale 2 puffs into the lungs every 4 (four) hours as needed for Wheezing or Shortness of Breath. Rescue    BREO ELLIPTA 100-25 MCG/DOSE DISKUS INHALER    Inhale 1 puff into the lungs once daily. Controller    CALCIUM CARBONATE-VIT D3- MG CALCIUM- 400 UNIT TAB    Take 1 tablet by mouth 2 (two) times daily.    CETIRIZINE (ZYRTEC) 10 MG TABLET    Take 10 mg by mouth once daily.    CLORAZEPATE (TRANXENE) 7.5 MG TAB    Take 1 tablet (7.5 mg total) by mouth 2 (two) times daily as needed (anxiety).    ERGOCALCIFEROL (ERGOCALCIFEROL) 50,000 UNIT CAP    Take 1 capsule (50,000 Units total) by mouth every 7 days.    ESCITALOPRAM OXALATE (LEXAPRO) 10 MG TABLET    Take 1 tablet (10 mg total) by mouth once daily.    ESOMEPRAZOLE (NEXIUM) 20 MG CAPSULE    Take 20 mg by mouth before breakfast.    ESTRADIOL (ESTRACE) 0.01 % (0.1 MG/GRAM) VAGINAL CREAM    0.5 grams with applicator or dime-sized amount with finger in vagina nightly x 2 weeks, then three times (Mon, Wed, Fri) a week thereafter    MULTIVITS-MIN/IRON/FA/LUTEIN (ULTIMATE WOMEN'S COMPLETE 50+ ORAL)    Take by mouth.    ROSUVASTATIN (CRESTOR) 10 MG TABLET    Take 1 tablet (10 mg total) by mouth once daily.         Subjective:    Patient ID: Concha Hess is a 74 y.o. female.  Chief  "Complaint: Follow-up (Pt would like to get some labs done to check her cholesterol. ) and Insomnia (Since menopause.)    HPI  History of Present Illness    CHIEF COMPLAINT:  Patient presents today to check cholesterol after weight loss.    WEIGHT MANAGEMENT:  She reports a 20-pound weight loss since August attributed to significant dietary changes, including elimination of daily fast food consumption and reduced dessert intake.    SLEEP DISORDER:  She reports a 15-18 year history of insomnia since menopause onset, characterized by difficulty falling and maintaining sleep until 1:00-2:00 AM. She reports optimal sleep between 5:00-11:00 AM. Multiple sleep interventions including melatonin, meditative music, and Z-Quil have been ineffective, with the latter causing hangover effects and morning grogginess.    MEDICATIONS:  Current medications include Tranxene 7.5 mg nightly (which she is open to decreasing), Lexapro, Breo, and prescription vitamin D. She also takes two extra strength Tylenol and cough medicine for sleep, though these are no longer effective.    PREVENTIVE CARE:  She is overdue for mammogram, colonoscopy, and bone density test.      ROS:  Constitutional: +weight loss  Psychiatric: +sleep difficulty       Review of Systems    Objective:      Vitals:    02/06/25 1024   BP: 126/80   BP Location: Right arm   Patient Position: Sitting   Pulse: 76   Resp: 18   Temp: 98 °F (36.7 °C)   TempSrc: Temporal   SpO2: 96%   Weight: 61.8 kg (136 lb 3.9 oz)   Height: 5' 2" (1.575 m)     BP Readings from Last 5 Encounters:   02/06/25 126/80   08/15/24 116/80   11/14/23 118/82   09/28/23 (!) 140/63   09/11/23 131/71     Wt Readings from Last 5 Encounters:   02/06/25 61.8 kg (136 lb 3.9 oz)   08/15/24 69.8 kg (153 lb 14.1 oz)   11/14/23 67.6 kg (149 lb 0.5 oz)   09/28/23 65.8 kg (145 lb)   09/11/23 67.1 kg (147 lb 14.9 oz)     Physical Exam  Physical Exam    Vitals: Weight loss of 20 lbs.  General: Well-developed. " Well-nourished. No acute distress.  Eyes: EOMI. Sclerae anicteric.  HENT: Normocephalic. Atraumatic. Nares patent. Moist oral mucosa.  Cardiovascular: Regular rate. Regular rhythm. No murmurs. No rubs. No gallops. Normal S1, S2.  Respiratory: Normal respiratory effort. Clear to auscultation bilaterally. No rales. No rhonchi. No wheezing.  Musculoskeletal: No  obvious deformity.  Extremities: No lower extremity edema.  Neurological: Alert & oriented x3. No slurred speech. Normal gait.  Psychiatric: Normal mood. Normal affect. Good insight. Good judgment.  Skin: Warm. Dry. No rash.         Lab Results   Component Value Date    WBC 7.98 08/15/2024    HGB 13.2 08/15/2024    HCT 41.5 08/15/2024     (H) 08/15/2024    CHOL 282 (H) 08/15/2024    TRIG 113 08/15/2024    HDL 65 08/15/2024    ALT 19 08/15/2024    AST 15 08/15/2024     08/15/2024    K 4.2 08/15/2024     08/15/2024    CREATININE 0.9 08/15/2024    BUN 13 08/15/2024    CO2 28 08/15/2024    TSH 1.922 08/15/2024    INR 1.0 06/02/2022      This note was generated with the assistance of ambient listening technology. Verbal consent was obtained by the patient and accompanying visitor(s) for the recording of patient appointment to facilitate this note. I attest to having reviewed and edited the generated note for accuracy, though some syntax or spelling errors may persist. Please contact the author of this note for any clarification.

## 2025-02-10 ENCOUNTER — PATIENT MESSAGE (OUTPATIENT)
Dept: PRIMARY CARE CLINIC | Facility: CLINIC | Age: 75
End: 2025-02-10
Payer: MEDICARE

## 2025-02-11 ENCOUNTER — PATIENT MESSAGE (OUTPATIENT)
Dept: PRIMARY CARE CLINIC | Facility: CLINIC | Age: 75
End: 2025-02-11
Payer: MEDICARE

## 2025-02-11 LAB
CHOLEST SERPL-MCNC: 183 MG/DL
HDL SERPL QN: 9.6 NM
HDL SERPL-SCNC: 30.5 UMOL/L
HDLC SERPL-MCNC: 64 MG/DL (ref 40–59)
HLD.LARGE SERPL-SCNC: 10.5 UMOL/L
LDL SERPL QN: 21.1 NM
LDL SERPL-SCNC: 1244 NMOL/L
LDL SMALL SERPL-SCNC: 393 NMOL/L
LDLC SERPL CALC-MCNC: 97 MG/DL
PATHOLOGY STUDY: ABNORMAL
TRIGL SERPL-MCNC: 110 MG/DL (ref 30–149)
VLDL LARGE SERPL-SCNC: <1.5 NMOL/L
VLDL SERPL QN: 44.7 NM

## 2025-02-12 ENCOUNTER — PATIENT MESSAGE (OUTPATIENT)
Dept: PRIMARY CARE CLINIC | Facility: CLINIC | Age: 75
End: 2025-02-12
Payer: MEDICARE

## 2025-02-17 ENCOUNTER — HOSPITAL ENCOUNTER (EMERGENCY)
Facility: OTHER | Age: 75
Discharge: HOME | End: 2025-02-17
Attending: EMERGENCY MEDICINE
Payer: MEDICARE

## 2025-02-17 VITALS
HEART RATE: 75 BPM | DIASTOLIC BLOOD PRESSURE: 68 MMHG | TEMPERATURE: 98 F | WEIGHT: 135 LBS | SYSTOLIC BLOOD PRESSURE: 116 MMHG | RESPIRATION RATE: 16 BRPM | HEIGHT: 62 IN | OXYGEN SATURATION: 96 % | BODY MASS INDEX: 24.84 KG/M2

## 2025-02-17 DIAGNOSIS — R50.9 FEBRILE ILLNESS: ICD-10-CM

## 2025-02-17 DIAGNOSIS — R10.11 RIGHT UPPER QUADRANT ABDOMINAL PAIN: Primary | ICD-10-CM

## 2025-02-17 LAB
CTP QC/QA: YES
CTP QC/QA: YES
POC MOLECULAR INFLUENZA A AGN: NEGATIVE
POC MOLECULAR INFLUENZA B AGN: NEGATIVE
SARS-COV-2 RDRP RESP QL NAA+PROBE: NEGATIVE

## 2025-02-17 PROCEDURE — 87635 SARS-COV-2 COVID-19 AMP PRB: CPT | Mod: HCNC | Performed by: EMERGENCY MEDICINE

## 2025-02-17 PROCEDURE — 99283 EMERGENCY DEPT VISIT LOW MDM: CPT | Mod: 25,HCNC

## 2025-02-17 PROCEDURE — 93005 ELECTROCARDIOGRAM TRACING: CPT | Mod: HCNC

## 2025-02-17 RX ORDER — ONDANSETRON 4 MG/1
4 TABLET, ORALLY DISINTEGRATING ORAL EVERY 6 HOURS PRN
Qty: 12 TABLET | Refills: 0 | Status: SHIPPED | OUTPATIENT
Start: 2025-02-17

## 2025-02-17 NOTE — DISCHARGE INSTRUCTIONS

## 2025-02-17 NOTE — ED PROVIDER NOTES
"  Source of History:  Medical record, patient      Chief complaint:  Per triage note: "Chills (Pt c/o HA, chills, and subjective fever intermittently since Saturday. States she had episode of cramping across her stomach when she laid down, currently resolved rated 0/10.)  "    HPI:    Patient presents for evaluation of subjective fevers, chills, headache, upper abdominal pain worse on the right for the past 3 days.  She states that her symptoms resolved upon arrival here except for flatulence and brlching. She was concerned her symptoms may be due to gallbladder disease given strong family history of gallbladder disease.  She denies any associated chest pain, cough, dyspnea, rhinorrhea.      ROS:   See HPI for pertinent review of systems      Review of patient's allergies indicates:   Allergen Reactions    Dilaudid [hydromorphone] Nausea And Vomiting    Morphine Nausea And Vomiting       PMH:  As per HPI and below:  Past Medical History:   Diagnosis Date    Asthma     Chronic lung disease     COPD (chronic obstructive pulmonary disease)     Depression     Gastric ulcer     GERD (gastroesophageal reflux disease)     Hyperlipidemia     Paralysis of diaphragm     left    PONV (postoperative nausea and vomiting)        Past Surgical History:   Procedure Laterality Date    APPENDECTOMY      AUGMENTATION OF BREAST      BREAST SURGERY      AUGMENTATION     SECTION      COSMETIC SURGERY      FACE LIFT breast reduction    HYSTEROSCOPY  2017       Social History[1]    Physical Exam:      Nursing note and vitals reviewed.  /68   Pulse 75   Temp 98 °F (36.7 °C) (Oral)   Resp 16   Ht 5' 2" (1.575 m)   Wt 61.2 kg (135 lb)   LMP  (LMP Unknown)   SpO2 96%   BMI 24.69 kg/m²     Constitutional: No distress.  Appears much younger than stated age.  Eyes: EOMI. No discharge. Anicteric.  HENT:   Neck: Normal range of motion. Neck supple.  Cardiovascular: Normal rate. No murmur, no gallop and no friction rub heard. "   Pulmonary/Chest: No respiratory distress. Effort normal. No wheezes, no rales, no rhonchi.   Abdominal: Bowel sounds normal. Soft. No distension and no mass. There is no tenderness. There is no rebound, no guarding, no tenderness at McBurney's point.  Neurological: GCS 15. Alert and oriented to person, place, and time. No gross cranial nerve, light touch or strength deficit. Coordination normal.   Skin: Skin is warm and dry.   EXT: 2+ radial pulses.         Medical Decision Making / Independent Interpretations / External Records Reviewed:      Patient is a 74-year-old female with history of GERD, COPD who presents for evaluation of fevers, chills, upper abdominal pain, nausea for the past 3 days not improved with alternating ibuprofen, acetaminophen until she arrived to the emergency department.  She denies any URI symptoms.  No clear inciting factor, however she does report a strong family history of gallbladder disease.    On exam, patient has no current complaints, has no abdominal tenderness, is well-appearing.  The initial differential included biliary colic, GERD, gastroenteritis, diverticulitis, pancreatitis, URI, dysrhythmia.  Symptoms are very atypical for acute coronary syndrome.  Acetaminophen toxicity was considered, however patient was pain-free, and describes taking appropriate doses and timing of acetaminophen.    ED Course as of 02/17/25 0822 Mon Feb 17, 2025 0819 --  EKG Interpretation: normal sinus rhythm at 71 beats per minute, no STEMI, no significant acute ST/T abnormalities, normal intervals.  No acute change compared to prior tracing.  --   [RC]      ED Course User Index  [RC] Rafael Figueroa MD              Procedures    --  I decided to obtain the patient's medical records. I reviewed patient's prior external notes / results:  PCP note and pharmacy / prescription records.   --  Additional Medical Decision Making: Prescription drug management and Decision regarding advanced  imaging    Pt seen at a time of critical national shortage of IV fluids, affecting decision making and treatment considerations.       Medications - No data to display           Future Appointments   Date Time Provider Department Center   5/6/2025  2:40 PM [facility] [place]          Diagnostic Impression:    1. Right upper quadrant abdominal pain    2. Febrile illness             ED Disposition Condition    Discharge Good          ED Prescriptions       Medication Sig Dispense Start Date End Date Auth. Provider    ondansetron (ZOFRAN-ODT) 4 MG TbDL Take 1 tablet (4 mg total) by mouth every 6 (six) hours as needed. 12 tablet 2/17/2025 -- Rafael Figueroa MD          Follow-up Information       Follow up With Specialties Details Why Contact Info Additional Information    John Vinson MD Family Medicine Schedule an appointment as soon as possible for a visit  For recheck with your primary care doctor [address]  [address]  [facility] [address] [phone]       [facility] General Surgery Surgery Schedule an appointment as soon as possible for a visit  For recheck with specialist [address]  [facility] [address] [phone] Turn at Entrance [address] on [address] in [facility] and take elevators to Floor 2. Follow signs to [facility]. Take [facility] Elevators to Floor 6 for [address].                 [1]   Social History  Tobacco Use    Smoking status: Former     Current packs/day: 1.00     Average packs/day: 1 pack/day for 29.0 years (29.0 ttl pk-yrs)     Types: Cigarettes     Passive exposure: Past    Smokeless tobacco: Never   Substance Use Topics    Alcohol use: Not Currently     Comment: none    Drug use: Yes     Comment: THC gummies at night        Rafael Figueroa MD  02/17/25 0822

## 2025-02-17 NOTE — ED NOTES
Pt c/o intermittent chills x several days and ABD cramping and gas that started today. Pt has been taking OTC Tylenol and did not check temp at home. Pt denies CP, SOB, weakness, dizziness, H/A, recent trauma or any other complaints.     Review of patient's allergies indicates:   Allergen Reactions    Dilaudid [hydromorphone] Nausea And Vomiting    Morphine Nausea And Vomiting        Patient has verified the spelling of their name and  on armband.   APPEARANCE: Patient is alert, calm, oriented x 4, and does not appear distressed.  SKIN: Skin is normal for race, warm, and dry. Normal skin turgor and mucous membranes moist.  CARDIAC: Normal rate and rhythm, no murmur heard.   RESPIRATORY:Normal rate and effort. Breath sounds clear bilaterally throughout chest. Respirations are equal and unlabored.    GASTRO: Bowel sounds normal, abdomen is soft, no tenderness, and no abdominal distention.  MUSCLE: Full ROM. No bony tenderness or soft tissue tenderness. No obvious deformity.  PERIPHERAL VASCULAR: peripheral pulses present. Normal cap refill. No edema. Warm to touch.  NEURO: 5/5 strength major flexors/extensors bilaterally. Sensory intact to light touch bilaterally. [place] coma scale: eyes open spontaneously-4, oriented & converses-5, obeys commands-6. No neurological abnormalities.   MENTAL STATUS: awake, alert and aware of environment.  : Voids without complication      ED orders in progress. Pt SR up x 2. Bed in lowest position with wheels locked. Call bell within reach of pt.

## 2025-02-18 ENCOUNTER — PATIENT OUTREACH (OUTPATIENT)
Facility: OTHER | Age: 75
End: 2025-02-18
Payer: MEDICARE

## 2025-02-19 LAB
OHS QRS DURATION: 92 MS
OHS QTC CALCULATION: 452 MS

## 2025-02-19 NOTE — PROGRESS NOTES
Maribel Joyce  ED Navigator  Emergency Department    [place] [facility]  Role: Community Health Worker    Date: 02/19/2025  Patient Name: Concha Hess  MRN: [id number]  PCP: John Vinson MD    Assessment:     Concha Hess is a 74 y.o. female who has presented to ED for RUQ pain. Patient has visited the ED 1 times in the past 3 months. Patient did contact PCP.     ED Navigator Initial Assessment    ED Navigator Enrollment Documentation  Consent to Services  Does patient consent to completing the assessment?: Yes  Contact  Method of Initial Contact: Phone  Transportation  Insurance Coverage  Do you have coverage/adequate coverage?: Yes  Specialist Appointment  Did the patient come to the ED to see a specialist?: No  Does the patient have a pending specialist referral?: No  Does the patient have a specialist appointment made?: No  PCP Follow Up Appointment  Medications  Is patient able to afford medication?: Yes  Psychological  Food  Communication/Education  Other Financial Concerns  Other Social Barriers/Concerns  Primary Barrier         Social History     Socioeconomic History    Marital status:    Tobacco Use    Smoking status: Former     Current packs/day: 1.00     Average packs/day: 1 pack/day for 29.0 years (29.0 ttl pk-yrs)     Types: Cigarettes     Passive exposure: Past    Smokeless tobacco: Never   Substance and Sexual Activity    Alcohol use: Not Currently     Comment: none    Drug use: Yes     Comment: THC gummies at night    Sexual activity: Not Currently     Birth control/protection: Post-menopausal   Social History Narrative    Lives alone. Feels safe in her home.      Social Drivers of Health     Financial Resource Strain: Low Risk  (11/14/2023)    Overall Financial Resource Strain (CARDIA)     Difficulty of Paying Living Expenses: Not hard at all   Food Insecurity: No Food Insecurity (11/14/2023)    Hunger Vital Sign     Worried About Running Out of Food in the Last Year:  Never true     Ran Out of Food in the Last Year: Never true   Transportation Needs: No Transportation Needs (11/14/2023)    PRAPARE - Transportation     Lack of Transportation (Medical): No     Lack of Transportation (Non-Medical): No   Physical Activity: Insufficiently Active (11/14/2023)    Exercise Vital Sign     Days of Exercise per Week: 2 days     Minutes of Exercise per Session: 40 min   Stress: No Stress Concern Present (11/14/2023)    [employer] [place] [employer] - Occupational Stress Questionnaire     Feeling of Stress : Only a little   Housing Stability: Unknown (11/14/2023)    Housing Stability Vital Sign     Unable to Pay for Housing in the Last Year: No     Unstable Housing in the Last Year: No       Plan:   Patient was seen in the ED on 2/17/25 for RUQ pain. Patient was contacted for post ED discharge navigation. They have an appointment scheduled with Dr. John Vinson on 2/26/25 at 10:20. ED navigator will remind patient of appointment.

## 2025-03-17 LAB — CRC RECOMMENDATION EXT: NORMAL

## 2025-03-19 ENCOUNTER — PATIENT OUTREACH (OUTPATIENT)
Dept: ADMINISTRATIVE | Facility: HOSPITAL | Age: 75
End: 2025-03-19
Payer: MEDICARE

## 2025-03-19 NOTE — PROGRESS NOTES
"Care Coordination Encounter Details:       [facility] Portal Status:         [x]  Reviewed [facility] Portal Status offered / enrolled if applicable        Additional Notes:     MyChart Outcomes: Pt is enrolled & active          Updates Requested / Reviewed:        Updated Care Coordination Note, Care Everywhere, , Care Team Updated, and Immunizations Reconciliation Completed or Queried: [place]         Health Maintenance Screening(s) Due:      Health Maintenance Topics Overdue:      VBHM Score: 1     Osteoporosis Screening                   Health Maintenance Topic(s) Outreach Outcomes & Actions Taken:    Colorectal Cancer Screening - Outreach Outcomes & Actions Taken  : External Records Uploaded, Care Team Updated, & History Updated if Applicable. Colonoscopy done 3/17/2025 with Dr. Lagunas. Repeat colonoscopy set for 6 months due to poor bowel prep     Breast Cancer Screening - Outreach Outcomes & Actions Taken  : Mammogram Screening Scheduled    Osteoporosis Screening - Outreach Outcomes & Actions Taken  : Portal message sent in regards to scheduling       Chronic Disease Management:     Diabetes Measures      No results found for: "LABA1C", "HGBA1C"        [x]  Reviewed chart for active Diabetes diagnosis     []  Scheduled necessary follow up appointments if needed         Additional Notes:  Patient is not diabetic            Hypertension Measures        BP Readings from Last 1 Encounters:   02/17/25 116/68           [x]  Reviewed chart for active Hypertension diagnosis     []  Reviewed & documented Home BP Cuff     []  Documented a Remote BP if needed & applicable     []  Scheduled necessary follow up appointments with Primary Care if needed         Additional Notes:             Provider Team Continuity:     Last PCP Visit Date: 2/6/2025          [x]  Reviewed Primary Care Provider Visits, Annual Wellness Visit, and Future          Appointments to ensure appointments have been scheduled and/or      "      completed        Additional Notes:  Next PCP appointment scheduled for 3/27/2025           Social Determinants of Health          [x]  Reviewed, completed, and/or updated the following sections:                  Food Insecurity, Transportation Needs, Financial Resource Strain,                 Tobacco Use           Care Management, Digital Medicine, and/or Education Referrals    OPCM Risk Score: 34.8         Next Steps - Referral Actions: Digital Medicine Outcomes and Actions Taken: Pt Declined or Not Eligible

## 2025-03-20 DIAGNOSIS — F41.9 ANXIETY: ICD-10-CM

## 2025-03-21 NOTE — TELEPHONE ENCOUNTER
Refill Routing Note   Medication(s) are not appropriate for processing by [facility] for the following reason(s):        Outside of protocol    [facility] action(s):  Route               Appointments  past 12m or future 3m with PCP    Date Provider   Last Visit   2/6/2025 John Vinson MD   Next Visit   3/27/2025 John Vinson MD   ED visits in past 90 days: 1        Note composed:4:09 PM 03/21/2025

## 2025-03-21 NOTE — TELEPHONE ENCOUNTER
No care due was identified.  [facility] Embedded Care Due Messages. Reference number: [id number].   3/20/2025 9:14:05 PM CDT

## 2025-03-24 RX ORDER — CLORAZEPATE DIPOTASSIUM 7.5 MG/1
TABLET ORAL
Qty: 60 TABLET | Refills: 1 | Status: SHIPPED | OUTPATIENT
Start: 2025-03-24

## 2025-03-26 ENCOUNTER — PATIENT OUTREACH (OUTPATIENT)
Facility: OTHER | Age: 75
End: 2025-03-26
Payer: MEDICARE

## 2025-04-07 ENCOUNTER — PATIENT MESSAGE (OUTPATIENT)
Dept: RADIOLOGY | Facility: HOSPITAL | Age: 75
End: 2025-04-07
Payer: MEDICARE

## 2025-05-08 DIAGNOSIS — E55.9 VITAMIN D DEFICIENCY: ICD-10-CM

## 2025-05-08 DIAGNOSIS — M85.851 OSTEOPENIA OF BOTH HIPS: ICD-10-CM

## 2025-05-08 DIAGNOSIS — M85.852 OSTEOPENIA OF BOTH HIPS: ICD-10-CM

## 2025-05-09 RX ORDER — LYSINE HCL 500 MG
1 TABLET ORAL 2 TIMES DAILY
Qty: 180 TABLET | Refills: 3 | Status: SHIPPED | OUTPATIENT
Start: 2025-05-09

## 2025-05-09 RX ORDER — ERGOCALCIFEROL 1.25 MG/1
50000 CAPSULE ORAL
Qty: 12 CAPSULE | Refills: 3 | Status: SHIPPED | OUTPATIENT
Start: 2025-05-09

## 2025-05-09 NOTE — TELEPHONE ENCOUNTER
Refill Routing Note   Medication(s) are not appropriate for processing by [facility] for the following reason(s):        Outside of protocol    [facility] action(s):  Route               Appointments  past 12m or future 3m with PCP    Date Provider   Last Visit   2/6/2025 John Vinson MD   Next Visit   5/8/2025 John Vinson MD   ED visits in past 90 days: 1        Note composed:11:15 AM 05/09/2025

## 2025-06-30 ENCOUNTER — PATIENT OUTREACH (OUTPATIENT)
Dept: ADMINISTRATIVE | Facility: HOSPITAL | Age: 75
End: 2025-06-30
Payer: MEDICARE

## 2025-06-30 NOTE — PROGRESS NOTES
"[facility] F/U outreach done    Care Coordination Encounter Details:       MyChart Portal Status:         [x]  Reviewed MyChart Portal Status offered / enrolled if applicable        Additional Notes:     MyChart Outcomes: Pt is enrolled & active          Updates Requested / Reviewed:        Updated Care Coordination Note, Care Everywhere, , and Immunizations Reconciliation Completed or Queried: [place]         Health Maintenance Screening(s) Due:      Health Maintenance Topics Overdue:      VBHM Score: 2     Osteoporosis Screening  Mammogram                 Health Maintenance Topic(s) Outreach Outcomes & Actions Taken:    Breast Cancer Screening - Outreach Outcomes & Actions Taken  : Portal message sent in regards to scheduling. Patient no showed to her last appointment on 5/12/2025      Chronic Disease Management:     Diabetes Measures      No results found for: "LABA1C", "HGBA1C"        [x]  Reviewed chart for active Diabetes diagnosis     []  Scheduled necessary follow up appointments if needed         Additional Notes:  Patient is not diabetic            Hypertension Measures        BP Readings from Last 1 Encounters:   02/17/25 116/68           [x]  Reviewed chart for active Hypertension diagnosis     []  Reviewed & documented Home BP Cuff     []  Documented a Remote BP if needed & applicable     []  Scheduled necessary follow up appointments with Primary Care if needed           Provider Team Continuity:     Last PCP Visit Date: 2/6/2025          [x]  Reviewed Primary Care Provider Visits, Annual Wellness Visit, and Future          Appointments to ensure appointments have been scheduled and/or           completed         Social Determinants of Health          [x]  Reviewed, completed, and/or updated the following sections:                  Food Insecurity, Transportation Needs, Financial Resource Strain,                 Tobacco Use        Additional Notes:             Care Management, Digital " Medicine, and/or Education Referrals    OPCM Risk Score: 39.1         Next Steps - Referral Actions: Digital Medicine Outcomes and Actions Taken: Pt Declined or Not Eligible

## 2025-07-02 ENCOUNTER — PATIENT OUTREACH (OUTPATIENT)
Dept: INTERNAL MEDICINE | Facility: CLINIC | Age: 75
End: 2025-07-02
Payer: MEDICARE

## 2025-07-19 DIAGNOSIS — F41.9 ANXIETY: ICD-10-CM

## 2025-07-19 NOTE — TELEPHONE ENCOUNTER
No care due was identified.  [facility] Embedded Care Due Messages. Reference number: [id number].   7/19/2025 10:45:20 AM CDT

## 2025-07-21 DIAGNOSIS — F41.9 ANXIETY: ICD-10-CM

## 2025-07-21 RX ORDER — CLORAZEPATE DIPOTASSIUM 7.5 MG/1
7.5 TABLET ORAL DAILY
Qty: 60 TABLET | Refills: 3 | OUTPATIENT
Start: 2025-07-21

## 2025-07-21 RX ORDER — CLORAZEPATE DIPOTASSIUM 7.5 MG/1
TABLET ORAL
Qty: 60 TABLET | Refills: 3 | Status: SHIPPED | OUTPATIENT
Start: 2025-07-21

## 2025-07-21 NOTE — TELEPHONE ENCOUNTER
No care due was identified.  [facility] Embedded Care Due Messages. Reference number: [id number].   7/21/2025 10:26:09 AM CDT

## 2025-07-21 NOTE — TELEPHONE ENCOUNTER
Refill Routing Note   Medication(s) are not appropriate for processing by [facility] for the following reason(s):        Outside of protocol    [facility] action(s):  Route             Appointments  past 12m or future 3m with PCP    Date Provider   Last Visit   2/6/2025 John Vinson MD   Next Visit   Visit date not found John Vinson MD   ED visits in past 90 days: 0        Note composed:1:32 PM 07/21/2025

## 2025-07-21 NOTE — TELEPHONE ENCOUNTER
Copied from CRM #[id number]. Topic: Medications - Medication Refill  >> Jul 21, 2025 10:20 AM Jenise wrote:  Requesting an RX refill or new RX.    Is this a refill or new RX:  refill     RX name and strength :  clorazepate (TRANXENE) 7.5 MG Tab    Is this a 30 day or 90 day RX:     Pharmacy name and phone # (  [id number] - [place], [place] - [address]  [address] [address]  Phone: [phone] Fax: [phone]    Who called and call back number: Patient [phone] . Patient states got a message from her pharmacy about 9:15 am notifying need approval from her PCP     The doctors have asked that we provide their patients with the following 2 reminders -- prescription refills can take up to 72 hours, and a friendly reminder that in the future you can use your [facility] account to request refills:

## 2025-08-05 DIAGNOSIS — F32.A DEPRESSION, UNSPECIFIED DEPRESSION TYPE: ICD-10-CM

## 2025-08-05 RX ORDER — ESCITALOPRAM OXALATE 10 MG/1
10 TABLET ORAL
Qty: 90 TABLET | Refills: 1 | Status: SHIPPED | OUTPATIENT
Start: 2025-08-05

## 2025-08-05 NOTE — TELEPHONE ENCOUNTER
No care due was identified.  [facility] Embedded Care Due Messages. Reference number: [id number].   8/05/2025 10:58:09 AM CDT

## 2025-08-06 NOTE — TELEPHONE ENCOUNTER
Refill Decision Note   Concha Hess  is requesting a refill authorization.  Brief Assessment and Rationale for Refill:  Approve     Medication Therapy Plan: Reviewed acute care/admission visit notes; No follow up with PCP recommended by acute care provider; Approved per protocol      Extended chart review required: Yes   Comments:     Note composed:9:07 PM 08/05/2025

## 2025-08-12 ENCOUNTER — PATIENT MESSAGE (OUTPATIENT)
Dept: INTERNAL MEDICINE | Facility: CLINIC | Age: 75
End: 2025-08-12
Payer: MEDICARE

## 2025-08-15 DIAGNOSIS — E78.5 HYPERLIPIDEMIA, UNSPECIFIED HYPERLIPIDEMIA TYPE: ICD-10-CM

## 2025-08-18 RX ORDER — ROSUVASTATIN CALCIUM 10 MG/1
10 TABLET, COATED ORAL
Qty: 90 TABLET | Refills: 3 | Status: SHIPPED | OUTPATIENT
Start: 2025-08-18

## (undated) DEVICE — PAD PERI POST REPLACEMNT

## (undated) DEVICE — SEE MEDLINE ITEM 146313

## (undated) DEVICE — SOL BETADINE 5%

## (undated) DEVICE — DEVICE TRUECLEAR INCISOR 2.9

## (undated) DEVICE — TRAY DRY SKIN SCRUB PREP

## (undated) DEVICE — GLOVE BIOGEL 7.5

## (undated) DEVICE — SET INFLOW TUBE HYSTER

## (undated) DEVICE — SOL IRR NACL .9% 3000ML

## (undated) DEVICE — SET BASIN 48X48IN 6000ML RING

## (undated) DEVICE — SEE MEDLINE ITEM 157110

## (undated) DEVICE — UNDERGLOVES BIOGEL PI SIZE 7.5

## (undated) DEVICE — Device

## (undated) DEVICE — SEE L#120831

## (undated) DEVICE — SOL PVP-I SCRUB 7.5% 4OZ

## (undated) DEVICE — GLOVE BIOGEL SKINSENSE PI 6.5